# Patient Record
Sex: MALE | Race: BLACK OR AFRICAN AMERICAN | NOT HISPANIC OR LATINO | Employment: FULL TIME | URBAN - METROPOLITAN AREA
[De-identification: names, ages, dates, MRNs, and addresses within clinical notes are randomized per-mention and may not be internally consistent; named-entity substitution may affect disease eponyms.]

---

## 2017-02-27 ENCOUNTER — OFFICE VISIT (OUTPATIENT)
Dept: INTERNAL MEDICINE | Facility: CLINIC | Age: 32
End: 2017-02-27
Payer: COMMERCIAL

## 2017-02-27 VITALS
SYSTOLIC BLOOD PRESSURE: 138 MMHG | HEART RATE: 78 BPM | HEIGHT: 68 IN | BODY MASS INDEX: 37.12 KG/M2 | WEIGHT: 244.94 LBS | DIASTOLIC BLOOD PRESSURE: 62 MMHG

## 2017-02-27 DIAGNOSIS — E11.65 TYPE 2 DIABETES MELLITUS WITH HYPERGLYCEMIA, WITH LONG-TERM CURRENT USE OF INSULIN: ICD-10-CM

## 2017-02-27 DIAGNOSIS — E11.69 ERECTILE DYSFUNCTION ASSOCIATED WITH TYPE 2 DIABETES MELLITUS: ICD-10-CM

## 2017-02-27 DIAGNOSIS — I10 ESSENTIAL HYPERTENSION: ICD-10-CM

## 2017-02-27 DIAGNOSIS — Z79.4 TYPE 2 DIABETES MELLITUS WITH COMPLICATION, WITH LONG-TERM CURRENT USE OF INSULIN: ICD-10-CM

## 2017-02-27 DIAGNOSIS — N52.1 ERECTILE DYSFUNCTION ASSOCIATED WITH TYPE 2 DIABETES MELLITUS: ICD-10-CM

## 2017-02-27 DIAGNOSIS — E78.5 HYPERLIPIDEMIA, UNSPECIFIED HYPERLIPIDEMIA TYPE: ICD-10-CM

## 2017-02-27 DIAGNOSIS — Z79.4 TYPE 2 DIABETES MELLITUS WITH HYPERGLYCEMIA, WITH LONG-TERM CURRENT USE OF INSULIN: ICD-10-CM

## 2017-02-27 DIAGNOSIS — E11.8 TYPE 2 DIABETES MELLITUS WITH COMPLICATION, WITH LONG-TERM CURRENT USE OF INSULIN: ICD-10-CM

## 2017-02-27 PROCEDURE — 3075F SYST BP GE 130 - 139MM HG: CPT | Mod: S$GLB,,, | Performed by: STUDENT IN AN ORGANIZED HEALTH CARE EDUCATION/TRAINING PROGRAM

## 2017-02-27 PROCEDURE — 3045F PR MOST RECENT HEMOGLOBIN A1C LEVEL 7.0-9.0%: CPT | Mod: S$GLB,,, | Performed by: STUDENT IN AN ORGANIZED HEALTH CARE EDUCATION/TRAINING PROGRAM

## 2017-02-27 PROCEDURE — 3078F DIAST BP <80 MM HG: CPT | Mod: S$GLB,,, | Performed by: STUDENT IN AN ORGANIZED HEALTH CARE EDUCATION/TRAINING PROGRAM

## 2017-02-27 PROCEDURE — 99999 PR PBB SHADOW E&M-EST. PATIENT-LVL III: CPT | Mod: PBBFAC,,, | Performed by: STUDENT IN AN ORGANIZED HEALTH CARE EDUCATION/TRAINING PROGRAM

## 2017-02-27 PROCEDURE — 4010F ACE/ARB THERAPY RXD/TAKEN: CPT | Mod: S$GLB,,, | Performed by: STUDENT IN AN ORGANIZED HEALTH CARE EDUCATION/TRAINING PROGRAM

## 2017-02-27 PROCEDURE — 99213 OFFICE O/P EST LOW 20 MIN: CPT | Mod: S$GLB,,, | Performed by: STUDENT IN AN ORGANIZED HEALTH CARE EDUCATION/TRAINING PROGRAM

## 2017-02-27 RX ORDER — METFORMIN HYDROCHLORIDE 1000 MG/1
1000 TABLET ORAL 2 TIMES DAILY WITH MEALS
Qty: 180 TABLET | Refills: 3 | Status: SHIPPED | OUTPATIENT
Start: 2017-02-27 | End: 2017-05-15 | Stop reason: SDUPTHER

## 2017-02-27 RX ORDER — SILDENAFIL 100 MG/1
100 TABLET, FILM COATED ORAL DAILY PRN
Qty: 5 TABLET | Refills: 2 | Status: SHIPPED | OUTPATIENT
Start: 2017-02-27 | End: 2018-04-06 | Stop reason: SDUPTHER

## 2017-02-27 RX ORDER — LANCETS
1 EACH MISCELLANEOUS 4 TIMES DAILY PRN
Qty: 100 EACH | Refills: 5 | Status: SHIPPED | OUTPATIENT
Start: 2017-02-27 | End: 2020-04-28

## 2017-02-27 RX ORDER — LISINOPRIL 2.5 MG/1
2.5 TABLET ORAL DAILY
Qty: 90 TABLET | Refills: 3 | Status: SHIPPED | OUTPATIENT
Start: 2017-02-27 | End: 2018-04-06 | Stop reason: SDUPTHER

## 2017-02-27 RX ORDER — PEN NEEDLE, DIABETIC 30 GX3/16"
NEEDLE, DISPOSABLE MISCELLANEOUS
Qty: 50 EACH | Refills: 6 | Status: SHIPPED | OUTPATIENT
Start: 2017-02-27 | End: 2018-08-16 | Stop reason: SDUPTHER

## 2017-02-27 RX ORDER — ATORVASTATIN CALCIUM 40 MG/1
40 TABLET, FILM COATED ORAL DAILY
Qty: 90 TABLET | Refills: 3 | Status: SHIPPED | OUTPATIENT
Start: 2017-02-27 | End: 2017-05-15 | Stop reason: SDUPTHER

## 2017-02-27 NOTE — PROGRESS NOTES
Subjective:       Patient ID: Fco Zafar III is a 31 y.o. male.    Chief Complaint: Follow-up (essential HTN)    Pt is here for follow up following last appointment 11/2016. His A1c improved from 11.2 (2015) when we initially met, to 9 -> 8.6 (07/09)->9.9 (11/16). Pt downloaded glucose tracking application at prior appointment and charted blood glucose values since the beginning of 2017 have been between 115-167  With a few outlier in the higher 180's. Has been taking 40u qd in the afternoon via pen.  Pt admits he has missed going to the gym for the last mo but was going 2-3x/week before. Pt is apx -4lbs from our last appointment. Counseled on diet and to watch out for sx of hypoglycemia if they should occur. Pt has been taking metformin 1k twice daily as well as insulin 40 once daily usually around 1pm. Pt was started on metformin 04/2016.      -TdAp and pneumonia vaccine 05/2016.   - Eye exam 8/16  - foot exam 11/16    Pt  Has no complaints today. Will measure A1C and give Flu vaccine    Diabetes   Pertinent negatives for hypoglycemia include no confusion, dizziness, headaches, hunger, nervousness/anxiousness, pallor, seizures, sweats or tremors. Pertinent negatives for diabetes include no blurred vision, no chest pain, no fatigue, no polydipsia, no polyphagia, no polyuria, no visual change, no weakness and no weight loss. Pertinent negatives for hypoglycemia complications include no blackouts and no required assistance. Diabetic complications include impotence.   Hyperlipidemia   Exacerbating diseases include diabetes and obesity. Pertinent negatives include no chest pain, focal weakness, leg pain, myalgias or shortness of breath. Current antihyperlipidemic treatment includes diet change and statins. The current treatment provides moderate improvement of lipids.     Review of Systems   Constitutional: Negative for activity change, appetite change, chills, diaphoresis, fatigue, fever, unexpected weight change  and weight loss.   HENT: Negative for congestion, dental problem, ear discharge, ear pain, facial swelling, hearing loss, mouth sores, nosebleeds, postnasal drip, rhinorrhea, sinus pressure, sneezing, sore throat, tinnitus and trouble swallowing.    Eyes: Negative for blurred vision, discharge, redness, itching and visual disturbance.   Respiratory: Negative for cough and shortness of breath.    Cardiovascular: Negative for chest pain and leg swelling.   Gastrointestinal: Negative for abdominal distention, abdominal pain, constipation, diarrhea, nausea, rectal pain and vomiting.   Endocrine: Negative for cold intolerance, heat intolerance, polydipsia, polyphagia and polyuria.   Genitourinary: Positive for impotence.   Musculoskeletal: Negative.  Negative for myalgias.   Skin: Negative.  Negative for pallor.   Allergic/Immunologic: Negative for environmental allergies, food allergies and immunocompromised state.   Neurological: Negative for dizziness, tremors, focal weakness, seizures, weakness and headaches.   Hematological: Negative.    Psychiatric/Behavioral: Negative for confusion. The patient is not nervous/anxious.          Objective:      Physical Exam   Constitutional: He is oriented to person, place, and time. He appears well-developed and well-nourished. No distress.   HENT:   Head: Normocephalic and atraumatic.   Right Ear: External ear normal.   Left Ear: External ear normal.   Eyes: Pupils are equal, round, and reactive to light.   Neck: Normal range of motion. Neck supple. No JVD present. No thyromegaly present.   Cardiovascular: Normal rate, regular rhythm, normal heart sounds and intact distal pulses.  Exam reveals no gallop and no friction rub.    No murmur heard.  Pulmonary/Chest: Effort normal and breath sounds normal. No respiratory distress. He has no wheezes. He has no rales. He exhibits no tenderness.   Abdominal: Soft. Bowel sounds are normal. He exhibits no distension and no mass. There is no  tenderness. There is no rebound and no guarding.   Musculoskeletal: Normal range of motion. He exhibits no edema or deformity.   Lymphadenopathy:     He has no cervical adenopathy.   Neurological: He is alert and oriented to person, place, and time. He has normal reflexes. He displays normal reflexes.   Skin: He is not diaphoretic. No erythema. No pallor.   Psychiatric: He has a normal mood and affect. His behavior is normal. Judgment and thought content normal.         Labs:     7/9/2016 13:46   Vitamin B-12 606   Sodium 140   Potassium 4.1   Chloride 104   CO2 27   Anion Gap 9   BUN, Bld 8   Creatinine 0.9   eGFR if non African American >60.0   eGFR if African American >60.0   Glucose 147 (H)   Calcium 9.4   Alkaline Phosphatase 100   Total Protein 7.3   Albumin 4.1   Total Bilirubin 0.7   AST 26   ALT 33   Hemoglobin A1C 8.6 (H)   Estimated Avg Glucose 200 (H)         Assessment:       1. Type 2 diabetes mellitus with hyperglycemia    2. Hyperlipemia    3. Health care maintenance        Plan:         1. Type 2 diabetes mellitus with hyperglycemia  - No hypoglycemic sx or episodes ever per pt  - continue lantus @ 40 U for now  - continue metformin 1k BID  - recheck A1C today    2. Hyperlipemia  - atorvastatin increased to mod dose of 40 MG tablet, @ 05/2016, pt tolerating W/O SE  - continue same dose    3. HTN  - will refill lisinopril today       RTC in 3mo at [facility] or [facility]    This patient was seen and plan reviewed with Dr. Wilson    It was a pleasure caring for this patient.    Ry Lindquist M.D.  IM PGY2

## 2017-02-27 NOTE — MR AVS SNAPSHOT
[facility]  [address]  [facility] [address]  Phone: [phone]  Fax: [phone]                  Fco Zafar III   2017 1:00 PM   Office Visit    Description:  Male : 1985   Provider:  Ry Lindquist MD   Department:  [facility]           Reason for Visit     Follow-up           Diagnoses this Visit        Comments    Essential hypertension         Type 2 diabetes mellitus with complication, with long-term current use of insulin         Hyperlipidemia, unspecified hyperlipidemia type         Erectile dysfunction associated with type 2 diabetes mellitus         Type 2 diabetes mellitus with hyperglycemia, with long-term current use of insulin                To Do List           Goals (5 Years of Data)     None       These Medications        Disp Refills Start End    lisinopril (PRINIVIL,ZESTRIL) 2.5 MG tablet 90 tablet 3 2017     Take 1 tablet (2.5 mg total) by mouth once daily. - Oral    Pharmacy: [facility] [place] [address] AT [facility] Ph #: [phone]       atorvastatin (LIPITOR) 40 MG tablet 90 tablet 3 2017     Take 1 tablet (40 mg total) by mouth once daily. - Oral    Pharmacy: [facility] [address] [place] [address] [address] AT [facility] Ph #: [phone]       sildenafil (VIAGRA) 100 MG tablet 5 tablet 2 2017    Take 1 tablet (100 mg total) by mouth daily as needed for Erectile Dysfunction. - Oral    Pharmacy: [facility] [address] [place] [address] [address] AT [facility] Ph #: [phone]       metformin (GLUCOPHAGE) 1000 MG tablet 180 tablet 3 2017    Take 1 tablet (1,000 mg total) by mouth 2 (two) times daily with meals. - Oral    Pharmacy: [facility] [address] [place] [address] [address] AT [facility] Ph #: [phone]  "      lancets Misc 100 each 5 2/27/2017     1 each by Misc.(Non-Drug; Combo Route) route 4 (four) times daily as needed. - Misc.(Non-Drug; Combo Route)    Pharmacy: [facility] [employer] [address] [place] [address] [place] [address] AT [facility] Ph #: [phone]       pen needle, diabetic 32 gauge x 5/32" Ndle 50 each 6 2/27/2017     Take as prescribed on insulin medication    Pharmacy: [facility] [employer] [address] [place] [address] [place] [address] AT [facility] Ph #: [phone]       blood sugar diagnostic Strp 100 strip 5 2/27/2017     1 strip by Misc.(Non-Drug; Combo Route) route 4 (four) times daily as needed. - Misc.(Non-Drug; Combo Route)    Pharmacy: [facility] [employer] [address] [place] [address] [place] [address] AT [facility] Ph #: [phone]         [facility]     [facility] Care [facility] - 24/7 Assistance  Registered nurses in the [facility] provide clinical advisement, health education, appointment booking, and other advisory services.  Call for this free service at [phone].             Medications           Message regarding Medications     Verify the changes and/or additions to your medication regime listed below are the same as discussed with your clinician today.  If any of these changes or additions are incorrect, please notify your healthcare provider.             Verify that the below list of medications is an accurate representation of the medications you are currently taking.  If none reported, the list may be blank. If incorrect, please contact your healthcare provider. Carry this list with you in case of emergency.           Current Medications     atorvastatin (LIPITOR) 40 MG tablet Take 1 tablet (40 mg total) by mouth once daily.    blood sugar diagnostic Strp 1 strip by Misc.(Non-Drug; Combo Route) route 4 (four) times daily as needed.    blood-glucose meter Misc 1 each by Misc.(Non-Drug; Combo Route) " "route 4 (four) times daily as needed.    econazole nitrate 1 % cream Apply topically 2 (two) times daily.    hydrOXYzine pamoate (VISTARIL) 25 MG Cap Take 1 capsule (25 mg total) by mouth every 8 (eight) hours as needed (anxiety).    insulin glargine (LANTUS SOLOSTAR) 100 unit/mL (3 mL) InPn pen Inject 40 Units into the skin once daily.    lancets Misc 1 each by Misc.(Non-Drug; Combo Route) route 4 (four) times daily as needed.    lisinopril (PRINIVIL,ZESTRIL) 2.5 MG tablet Take 1 tablet (2.5 mg total) by mouth once daily.    metformin (GLUCOPHAGE) 1000 MG tablet Take 1 tablet (1,000 mg total) by mouth 2 (two) times daily with meals.    pen needle, diabetic 32 gauge x 5/32" Ndle Take as prescribed on insulin medication    sildenafil (VIAGRA) 100 MG tablet Take 1 tablet (100 mg total) by mouth daily as needed for Erectile Dysfunction.           Clinical Reference Information           Your Vitals Were     BP                   138/62           Blood Pressure          Most Recent Value    BP  138/62      Allergies as of 2/27/2017     No Known Allergies      Immunizations Administered on Date of Encounter - 2/27/2017     None      Orders Placed During Today's Visit     Future Labs/Procedures Expected by Expires    Hemoglobin A1c  2/27/2017 4/28/2018      Language Assistance Services     ATTENTION: Language assistance services are available, free of charge. Please call [phone].      ATENCIÓN: Si habla gracyañol, tiene a parr disposición servicios gratuitos de asistencia lingüística. Llame al [phone].     [facility] Ý: N?u b?n nói Ti?ng Vi?t, có các d?ch v? h? tr? ngôn ng? mi?n phí dành cho b?n. G?i s? [phone].         [facility] complies with applicable Federal civil rights laws and does not discriminate on the basis of race, color, national origin, age, disability, or sex.        "

## 2017-03-01 RX ORDER — GLIPIZIDE 5 MG/1
5 TABLET, FILM COATED, EXTENDED RELEASE ORAL
Qty: 90 TABLET | Refills: 0 | Status: SHIPPED | OUTPATIENT
Start: 2017-03-01 | End: 2017-05-15 | Stop reason: SDUPTHER

## 2017-03-01 NOTE — PROGRESS NOTES
Contacted patient regarding last A1C of 8.9 on 2/27/17 improved from previous 9.9. Based on his BG ranges, prandial spikes, and previous hx of difficulty w/ scheduled insulin dosage adherence will start on XR glipizide 5mg today to be added to current regimen of 40U insulin qd + metformin 1k BID. Contacted pt who expressed understanding and appreciation. Counseled on monitoring for sx of hypoglycemia. Will f/u with me at [facility] clinic May 15 at 3:30PM for repeat a1c.    It was a pleasure caring for this patient,    Ry Lindquist MD  Internal Medicine PGY2

## 2017-03-07 NOTE — PROGRESS NOTES
I have personally taken the history and examined this patient and agree with the resident's note as stated above with the following thoughts:    Hgb A1c 8.9 but home glucoses sound better- Continue current meds and follow up in 3 months-- diet and exercise discussed today

## 2017-05-15 ENCOUNTER — OFFICE VISIT (OUTPATIENT)
Dept: INTERNAL MEDICINE | Facility: CLINIC | Age: 32
End: 2017-05-15
Attending: INTERNAL MEDICINE
Payer: COMMERCIAL

## 2017-05-15 VITALS
SYSTOLIC BLOOD PRESSURE: 138 MMHG | DIASTOLIC BLOOD PRESSURE: 78 MMHG | HEIGHT: 67 IN | TEMPERATURE: 98 F | BODY MASS INDEX: 38.72 KG/M2 | WEIGHT: 246.69 LBS | OXYGEN SATURATION: 97 % | HEART RATE: 96 BPM

## 2017-05-15 DIAGNOSIS — Z79.4 TYPE 2 DIABETES MELLITUS WITH COMPLICATION, WITH LONG-TERM CURRENT USE OF INSULIN: ICD-10-CM

## 2017-05-15 DIAGNOSIS — B35.3 TINEA PEDIS OF BOTH FEET: ICD-10-CM

## 2017-05-15 DIAGNOSIS — Z79.4 TYPE 2 DIABETES MELLITUS WITH HYPERGLYCEMIA, WITH LONG-TERM CURRENT USE OF INSULIN: Primary | ICD-10-CM

## 2017-05-15 DIAGNOSIS — E11.8 TYPE 2 DIABETES MELLITUS WITH COMPLICATION, WITH LONG-TERM CURRENT USE OF INSULIN: ICD-10-CM

## 2017-05-15 DIAGNOSIS — E11.65 TYPE 2 DIABETES MELLITUS WITH HYPERGLYCEMIA, WITH LONG-TERM CURRENT USE OF INSULIN: Primary | ICD-10-CM

## 2017-05-15 DIAGNOSIS — E78.5 HYPERLIPIDEMIA, UNSPECIFIED HYPERLIPIDEMIA TYPE: ICD-10-CM

## 2017-05-15 PROCEDURE — 99214 OFFICE O/P EST MOD 30 MIN: CPT | Mod: S$GLB,,, | Performed by: STUDENT IN AN ORGANIZED HEALTH CARE EDUCATION/TRAINING PROGRAM

## 2017-05-15 PROCEDURE — 3075F SYST BP GE 130 - 139MM HG: CPT | Mod: S$GLB,,, | Performed by: STUDENT IN AN ORGANIZED HEALTH CARE EDUCATION/TRAINING PROGRAM

## 2017-05-15 PROCEDURE — 3078F DIAST BP <80 MM HG: CPT | Mod: S$GLB,,, | Performed by: STUDENT IN AN ORGANIZED HEALTH CARE EDUCATION/TRAINING PROGRAM

## 2017-05-15 PROCEDURE — 4010F ACE/ARB THERAPY RXD/TAKEN: CPT | Mod: S$GLB,,, | Performed by: STUDENT IN AN ORGANIZED HEALTH CARE EDUCATION/TRAINING PROGRAM

## 2017-05-15 PROCEDURE — 3045F PR MOST RECENT HEMOGLOBIN A1C LEVEL 7.0-9.0%: CPT | Mod: S$GLB,,, | Performed by: STUDENT IN AN ORGANIZED HEALTH CARE EDUCATION/TRAINING PROGRAM

## 2017-05-15 PROCEDURE — 99999 PR PBB SHADOW E&M-EST. PATIENT-LVL III: CPT | Mod: PBBFAC,,, | Performed by: STUDENT IN AN ORGANIZED HEALTH CARE EDUCATION/TRAINING PROGRAM

## 2017-05-15 RX ORDER — INSULIN GLARGINE 100 [IU]/ML
40 INJECTION, SOLUTION SUBCUTANEOUS DAILY
Qty: 15 ML | Refills: 7 | Status: SHIPPED | OUTPATIENT
Start: 2017-05-15 | End: 2017-05-23 | Stop reason: SDUPTHER

## 2017-05-15 RX ORDER — GLIPIZIDE 5 MG/1
5 TABLET, FILM COATED, EXTENDED RELEASE ORAL
Qty: 90 TABLET | Refills: 0 | Status: SHIPPED | OUTPATIENT
Start: 2017-05-15 | End: 2017-07-05 | Stop reason: ALTCHOICE

## 2017-05-15 RX ORDER — METFORMIN HYDROCHLORIDE 1000 MG/1
1000 TABLET ORAL 2 TIMES DAILY WITH MEALS
Qty: 180 TABLET | Refills: 3 | Status: SHIPPED | OUTPATIENT
Start: 2017-05-15 | End: 2018-07-23 | Stop reason: SDUPTHER

## 2017-05-15 RX ORDER — ATORVASTATIN CALCIUM 40 MG/1
40 TABLET, FILM COATED ORAL DAILY
Qty: 90 TABLET | Refills: 3 | Status: SHIPPED | OUTPATIENT
Start: 2017-05-15 | End: 2018-04-06 | Stop reason: SDUPTHER

## 2017-05-15 NOTE — PROGRESS NOTES
Subjective:       Patient ID: Fco Zafar III is a 31 y.o. male.    Chief Complaint: Annual Exam    Pt is here for follow up following last appointment 11/2016. His A1c improved from 11.2 (2015) when we initially met, to 9 -> 8.6 (07/09)->9.9 (11/16). Pt downloaded glucose tracking application at prior appointment and charted blood glucose values since the beginning of 2017 have been between 115-167  With a few outlier in the higher 180's. Has been taking 40u qd in the afternoon via pen.  Pt admits he has missed going to the gym for the last mo but was going 2-3x/week before. Pt is apx -4lbs from our last appointment. Counseled on diet and to watch out for sx of hypoglycemia if they should occur. Pt has been taking metformin 1k twice daily as well as insulin 40 once daily usually around 1pm. Pt was started on metformin 04/2016.      -TdAp and pneumonia vaccine 05/2016.   - Eye exam 8/16  - foot exam 11/16    Pt  Has no complaints today. Will measure A1C and give Flu vaccine    Diabetes   Pertinent negatives for hypoglycemia include no confusion, dizziness, headaches, hunger, nervousness/anxiousness, pallor, seizures, sweats or tremors. Pertinent negatives for diabetes include no blurred vision, no chest pain, no fatigue, no polydipsia, no polyphagia, no polyuria, no visual change, no weakness and no weight loss. Pertinent negatives for hypoglycemia complications include no blackouts and no required assistance. Diabetic complications include impotence.   Hyperlipidemia   Exacerbating diseases include diabetes and obesity. Pertinent negatives include no chest pain, focal weakness, leg pain, myalgias or shortness of breath. Current antihyperlipidemic treatment includes diet change and statins. The current treatment provides moderate improvement of lipids.     Review of Systems   Constitutional: Negative for activity change, appetite change, chills, diaphoresis, fatigue, fever, unexpected weight change and weight  loss.   HENT: Negative for congestion, dental problem, ear discharge, ear pain, facial swelling, hearing loss, mouth sores, nosebleeds, postnasal drip, rhinorrhea, sinus pressure, sneezing, sore throat, tinnitus and trouble swallowing.    Eyes: Negative for blurred vision, discharge, redness, itching and visual disturbance.   Respiratory: Negative for cough and shortness of breath.    Cardiovascular: Negative for chest pain and leg swelling.   Gastrointestinal: Negative for abdominal distention, abdominal pain, constipation, diarrhea, nausea, rectal pain and vomiting.   Endocrine: Negative for cold intolerance, heat intolerance, polydipsia, polyphagia and polyuria.   Genitourinary: Positive for impotence.   Musculoskeletal: Negative.  Negative for myalgias.   Skin: Negative.  Negative for pallor.   Allergic/Immunologic: Negative for environmental allergies, food allergies and immunocompromised state.   Neurological: Negative for dizziness, tremors, focal weakness, seizures, weakness and headaches.   Hematological: Negative.    Psychiatric/Behavioral: Negative for confusion. The patient is not nervous/anxious.          Objective:      Physical Exam   Constitutional: He is oriented to person, place, and time. He appears well-developed and well-nourished. No distress.   HENT:   Head: Normocephalic and atraumatic.   Right Ear: External ear normal.   Left Ear: External ear normal.   Eyes: Pupils are equal, round, and reactive to light.   Neck: Normal range of motion. Neck supple. No JVD present. No thyromegaly present.   Cardiovascular: Normal rate, regular rhythm, normal heart sounds and intact distal pulses.  Exam reveals no gallop and no friction rub.    No murmur heard.  Pulmonary/Chest: Effort normal and breath sounds normal. No respiratory distress. He has no wheezes. He has no rales. He exhibits no tenderness.   Abdominal: Soft. Bowel sounds are normal. He exhibits no distension and no mass. There is no  tenderness. There is no rebound and no guarding.   Musculoskeletal: Normal range of motion. He exhibits no edema or deformity.   Lymphadenopathy:     He has no cervical adenopathy.   Neurological: He is alert and oriented to person, place, and time. He has normal reflexes. He displays normal reflexes.   Skin: He is not diaphoretic. No erythema. No pallor.   Psychiatric: He has a normal mood and affect. His behavior is normal. Judgment and thought content normal.         Labs:       7/9/2016 13:46 7/11/2016 14:34 11/12/2016 09:09 2/27/2017 13:53   Hemoglobin A1C 8.6 (H)  9.9 (H) 8.9 (H)   Estimated Avg Glucose 200 (H)  237 (H) 209 (H)     In Clinic Fingerstick Glucose 5/15/17:   210    Assessment:     32yo M w/ DM2 presents for 3mo f/u. Was 12.9 upon establishing care with me in 8/15, now 210 in house last A1c 8.9, [place] [facility] [place]. Will return for F/U and repeat yearly labs in 3mo.     Plan:       1. Type 2 diabetes mellitus with hyperglycemia, with long-term current use of insulin  - POCT GLUCOSE  - Hemoglobin A1c; Future  - insulin glargine (LANTUS SOLOSTAR) 100 unit/mL (3 mL) InPn pen; Inject 40 Units into the skin once daily.  Dispense: 15 mL; Refill: 7    2. Tinea pedis of both feet  Foot exam completed today 5/15/17 WNL     3. Type 2 diabetes mellitus with complication, with long-term current use of insulin  - metformin (GLUCOPHAGE) 1000 MG tablet; Take 1 tablet (1,000 mg total) by mouth 2 (two) times daily with meals.  Dispense: 180 tablet; Refill: 3  - insulin glargine (LANTUS SOLOSTAR) 100 unit/mL (3 mL) InPn pen; Inject 40 Units into the skin once daily.  Dispense: 15 mL; Refill: 7    4. Hyperlipidemia, unspecified hyperlipidemia type  - atorvastatin (LIPITOR) 40 MG tablet; Take 1 tablet (40 mg total) by mouth once daily.  Dispense: 90 tablet; Refill: 3    RTC in 3mo at [facility] or [facility]    This patient was seen and plan reviewed with Dr. Dvorin    It was a pleasure caring  for this patient.    Ry Lindquist M.D.  IM PGY2

## 2017-05-15 NOTE — MR AVS SNAPSHOT
[facility]  [address] [place] [address]  Phone: [phone]  Fax: [phone]                  Fco Zafar III   5/15/2017 3:30 PM   Office Visit    Description:  Male : 1985   Provider:  Ry Lindquist MD   Department:  [facility]           Reason for Visit     Annual Exam           Diagnoses this Visit        Comments    Type 2 diabetes mellitus with hyperglycemia, with long-term current use of insulin    -  Primary            To Do List           Goals (5 Years of Data)     None      [facility] On [facility]     [facility] Care [facility] -  Assistance  Unless otherwise directed by your provider, please contact [facility], our nurse care line that is available for  assistance.     Registered nurses in the [facility] provide: appointment scheduling, clinical advisement, health education, and other advisory services.  Call: [phone] (toll free)               Medications           Message regarding Medications     Verify the changes and/or additions to your medication regime listed below are the same as discussed with your clinician today.  If any of these changes or additions are incorrect, please notify your healthcare provider.             Verify that the below list of medications is an accurate representation of the medications you are currently taking.  If none reported, the list may be blank. If incorrect, please contact your healthcare provider. Carry this list with you in case of emergency.           Current Medications     atorvastatin (LIPITOR) 40 MG tablet Take 1 tablet (40 mg total) by mouth once daily.    blood sugar diagnostic Strp 1 strip by Misc.(Non-Drug; Combo Route) route 4 (four) times daily as needed.    blood-glucose meter Misc 1 each by Misc.(Non-Drug; Combo Route) route 4 (four) times daily as needed.    econazole nitrate 1 % cream Apply topically 2 (two) times daily.    glipiZIDE (GLUCOTROL)  "5 MG TR24 Take 1 tablet (5 mg total) by mouth daily with breakfast.    hydrOXYzine pamoate (VISTARIL) 25 MG Cap Take 1 capsule (25 mg total) by mouth every 8 (eight) hours as needed (anxiety).    insulin glargine (LANTUS SOLOSTAR) 100 unit/mL (3 mL) InPn pen Inject 40 Units into the skin once daily.    lancets Misc 1 each by Misc.(Non-Drug; Combo Route) route 4 (four) times daily as needed.    lisinopril (PRINIVIL,ZESTRIL) 2.5 MG tablet Take 1 tablet (2.5 mg total) by mouth once daily.    metformin (GLUCOPHAGE) 1000 MG tablet Take 1 tablet (1,000 mg total) by mouth 2 (two) times daily with meals.    pen needle, diabetic 32 gauge x 5/32" Ndle Take as prescribed on insulin medication    sildenafil (VIAGRA) 100 MG tablet Take 1 tablet (100 mg total) by mouth daily as needed for Erectile Dysfunction.           Clinical Reference Information           Your Vitals Were     BP Pulse Temp Height Weight SpO2    138/78 (BP Location: Right arm, Patient Position: Sitting, BP Method: Manual) 96 97.7 °F (36.5 °C) 5' 7" (1.702 m) 111.9 kg (246 lb 11.1 oz) 97%    BMI                38.64 kg/m2          Blood Pressure          Most Recent Value    BP  138/78      Allergies as of 5/15/2017     No Known Allergies      Immunizations Administered on Date of Encounter - 5/15/2017     None      Orders Placed During Today's Visit      Normal Orders This Visit    POCT GLUCOSE       Language Assistance Services     ATTENTION: Language assistance services are available, free of charge. Please call [phone].      ATENCIÓN: Si habla edna, tiene a parr disposición servicios gratuitos de asistencia lingüística. Llame al [phone].     ANGEL Ý: N?u b?n nói Ti?ng Vi?t, có các d?ch v? h? tr? ngôn ng? mi?n phí dành cho b?n. G?i s? [phone].         [facility] complies with applicable Federal civil rights laws and does not discriminate on the basis of race, color, national origin, age, disability, or sex.        "

## 2017-05-20 ENCOUNTER — LAB VISIT (OUTPATIENT)
Dept: LAB | Facility: HOSPITAL | Age: 32
End: 2017-05-20
Payer: COMMERCIAL

## 2017-05-20 DIAGNOSIS — E11.65 TYPE 2 DIABETES MELLITUS WITH HYPERGLYCEMIA, WITH LONG-TERM CURRENT USE OF INSULIN: ICD-10-CM

## 2017-05-20 DIAGNOSIS — Z79.4 TYPE 2 DIABETES MELLITUS WITH HYPERGLYCEMIA, WITH LONG-TERM CURRENT USE OF INSULIN: ICD-10-CM

## 2017-05-20 PROCEDURE — 36415 COLL VENOUS BLD VENIPUNCTURE: CPT

## 2017-05-20 PROCEDURE — 83036 HEMOGLOBIN GLYCOSYLATED A1C: CPT

## 2017-05-22 LAB
ESTIMATED AVG GLUCOSE: 217 MG/DL
HBA1C MFR BLD HPLC: 9.2 %

## 2017-05-23 DIAGNOSIS — Z79.4 TYPE 2 DIABETES MELLITUS WITH HYPERGLYCEMIA, WITH LONG-TERM CURRENT USE OF INSULIN: ICD-10-CM

## 2017-05-23 DIAGNOSIS — Z79.4 TYPE 2 DIABETES MELLITUS WITH COMPLICATION, WITH LONG-TERM CURRENT USE OF INSULIN: ICD-10-CM

## 2017-05-23 DIAGNOSIS — E11.65 TYPE 2 DIABETES MELLITUS WITH HYPERGLYCEMIA, WITH LONG-TERM CURRENT USE OF INSULIN: ICD-10-CM

## 2017-05-23 DIAGNOSIS — E11.8 TYPE 2 DIABETES MELLITUS WITH COMPLICATION, WITH LONG-TERM CURRENT USE OF INSULIN: ICD-10-CM

## 2017-05-23 RX ORDER — INSULIN GLARGINE 100 [IU]/ML
25 INJECTION, SOLUTION SUBCUTANEOUS 2 TIMES DAILY
Qty: 15 ML | Refills: 5 | Status: SHIPPED | OUTPATIENT
Start: 2017-05-23 | End: 2018-06-18 | Stop reason: SDUPTHER

## 2017-05-23 NOTE — PROGRESS NOTES
Contacted the pt regarding his A1c which has increased from 8.9 to 9.2. He has been checking his morning sugars occasionally since our last apt avg has been 170-220 correlating with elevated A1c. Lantus dosage adjusted from 40u qd to 25u BID for better glucose control. Pt instructed on signs of hypoglycemia and will continue to check his sugars daily. Will f/u in clinic.    It was a pleasure caring for this patient,    Ry Lindquist MD  IM PGY2

## 2017-07-05 RX ORDER — GLIPIZIDE 5 MG/1
TABLET, FILM COATED, EXTENDED RELEASE ORAL
Qty: 90 TABLET | Refills: 0 | Status: SHIPPED | OUTPATIENT
Start: 2017-07-05 | End: 2018-04-25

## 2018-03-27 ENCOUNTER — TELEPHONE (OUTPATIENT)
Dept: INTERNAL MEDICINE | Facility: CLINIC | Age: 33
End: 2018-03-27

## 2018-03-27 NOTE — TELEPHONE ENCOUNTER
----- Message from Chika Marin sent at 3/16/2018 10:40 AM CDT -----  Contact: Pt request from [facility]  Appointment Request From: Fco Zafar III    With Provider: Ry Lindquist MD [-Primary Care Physician-]    Would Accept With:Only the person I've selected    Preferred Date Range: Any date 3/19/2018 or later    Preferred Times: Any    Reason for visit: Request an Appt    Comments:  Check up with blood work.

## 2018-04-06 DIAGNOSIS — N52.1 ERECTILE DYSFUNCTION ASSOCIATED WITH TYPE 2 DIABETES MELLITUS: ICD-10-CM

## 2018-04-06 DIAGNOSIS — E11.8 TYPE 2 DIABETES MELLITUS WITH COMPLICATION, WITH LONG-TERM CURRENT USE OF INSULIN: ICD-10-CM

## 2018-04-06 DIAGNOSIS — E78.5 HYPERLIPIDEMIA, UNSPECIFIED HYPERLIPIDEMIA TYPE: ICD-10-CM

## 2018-04-06 DIAGNOSIS — Z79.4 TYPE 2 DIABETES MELLITUS WITH COMPLICATION, WITH LONG-TERM CURRENT USE OF INSULIN: ICD-10-CM

## 2018-04-06 DIAGNOSIS — I10 ESSENTIAL HYPERTENSION: ICD-10-CM

## 2018-04-06 DIAGNOSIS — E11.69 ERECTILE DYSFUNCTION ASSOCIATED WITH TYPE 2 DIABETES MELLITUS: ICD-10-CM

## 2018-04-06 RX ORDER — SILDENAFIL 100 MG/1
100 TABLET, FILM COATED ORAL DAILY PRN
Qty: 5 TABLET | Refills: 2 | Status: SHIPPED | OUTPATIENT
Start: 2018-04-06 | End: 2019-05-07 | Stop reason: SDUPTHER

## 2018-04-06 RX ORDER — LISINOPRIL 2.5 MG/1
2.5 TABLET ORAL DAILY
Qty: 90 TABLET | Refills: 3 | Status: SHIPPED | OUTPATIENT
Start: 2018-04-06 | End: 2019-05-13 | Stop reason: SDUPTHER

## 2018-04-06 RX ORDER — ATORVASTATIN CALCIUM 40 MG/1
40 TABLET, FILM COATED ORAL DAILY
Qty: 90 TABLET | Refills: 3 | Status: SHIPPED | OUTPATIENT
Start: 2018-04-06 | End: 2019-05-13 | Stop reason: SDUPTHER

## 2018-04-25 ENCOUNTER — CLINICAL SUPPORT (OUTPATIENT)
Dept: DIABETES | Facility: CLINIC | Age: 33
End: 2018-04-25
Payer: COMMERCIAL

## 2018-04-25 ENCOUNTER — OFFICE VISIT (OUTPATIENT)
Dept: INTERNAL MEDICINE | Facility: CLINIC | Age: 33
End: 2018-04-25
Payer: COMMERCIAL

## 2018-04-25 VITALS
HEIGHT: 67 IN | OXYGEN SATURATION: 99 % | SYSTOLIC BLOOD PRESSURE: 115 MMHG | BODY MASS INDEX: 36.99 KG/M2 | WEIGHT: 235.69 LBS | DIASTOLIC BLOOD PRESSURE: 75 MMHG | HEART RATE: 71 BPM

## 2018-04-25 DIAGNOSIS — Z79.4 TYPE 2 DIABETES MELLITUS WITH HYPERGLYCEMIA, WITH LONG-TERM CURRENT USE OF INSULIN: Primary | ICD-10-CM

## 2018-04-25 DIAGNOSIS — R73.09 ELEVATED HEMOGLOBIN A1C: Primary | ICD-10-CM

## 2018-04-25 DIAGNOSIS — E78.5 HYPERLIPIDEMIA, UNSPECIFIED HYPERLIPIDEMIA TYPE: ICD-10-CM

## 2018-04-25 DIAGNOSIS — Z79.4 TYPE 2 DIABETES MELLITUS WITH HYPERGLYCEMIA, WITH LONG-TERM CURRENT USE OF INSULIN: ICD-10-CM

## 2018-04-25 DIAGNOSIS — E11.65 TYPE 2 DIABETES MELLITUS WITH HYPERGLYCEMIA, WITH LONG-TERM CURRENT USE OF INSULIN: ICD-10-CM

## 2018-04-25 DIAGNOSIS — E11.65 TYPE 2 DIABETES MELLITUS WITH HYPERGLYCEMIA, WITH LONG-TERM CURRENT USE OF INSULIN: Primary | ICD-10-CM

## 2018-04-25 LAB
BACTERIA #/AREA URNS AUTO: NORMAL /HPF
BILIRUB UR QL STRIP: NEGATIVE
CLARITY UR REFRACT.AUTO: CLEAR
COLOR UR AUTO: YELLOW
CREAT UR-MCNC: 112 MG/DL
CREAT UR-MCNC: 112 MG/DL
GLUCOSE UR QL STRIP: ABNORMAL
HGB UR QL STRIP: NEGATIVE
KETONES UR QL STRIP: NEGATIVE
LEUKOCYTE ESTERASE UR QL STRIP: NEGATIVE
MICROALBUMIN UR DL<=1MG/L-MCNC: 58 UG/ML
MICROALBUMIN/CREATININE RATIO: 51.8 UG/MG
MICROSCOPIC COMMENT: NORMAL
NITRITE UR QL STRIP: NEGATIVE
PH UR STRIP: 5 [PH] (ref 5–8)
PROT UR QL STRIP: NEGATIVE
RBC #/AREA URNS AUTO: 1 /HPF (ref 0–4)
SP GR UR STRIP: 1.01 (ref 1–1.03)
SQUAMOUS #/AREA URNS AUTO: 0 /HPF
URN SPEC COLLECT METH UR: ABNORMAL
UROBILINOGEN UR STRIP-ACNC: NEGATIVE EU/DL
WBC #/AREA URNS AUTO: 1 /HPF (ref 0–5)
YEAST UR QL AUTO: NORMAL

## 2018-04-25 PROCEDURE — 81001 URINALYSIS AUTO W/SCOPE: CPT

## 2018-04-25 PROCEDURE — 99999 PR PBB SHADOW E&M-EST. PATIENT-LVL V: CPT | Mod: PBBFAC,,, | Performed by: STUDENT IN AN ORGANIZED HEALTH CARE EDUCATION/TRAINING PROGRAM

## 2018-04-25 PROCEDURE — 82043 UR ALBUMIN QUANTITATIVE: CPT

## 2018-04-25 PROCEDURE — 3078F DIAST BP <80 MM HG: CPT | Mod: CPTII,S$GLB,, | Performed by: STUDENT IN AN ORGANIZED HEALTH CARE EDUCATION/TRAINING PROGRAM

## 2018-04-25 PROCEDURE — 99214 OFFICE O/P EST MOD 30 MIN: CPT | Mod: S$GLB,,, | Performed by: STUDENT IN AN ORGANIZED HEALTH CARE EDUCATION/TRAINING PROGRAM

## 2018-04-25 PROCEDURE — 3074F SYST BP LT 130 MM HG: CPT | Mod: CPTII,S$GLB,, | Performed by: STUDENT IN AN ORGANIZED HEALTH CARE EDUCATION/TRAINING PROGRAM

## 2018-04-25 PROCEDURE — 3008F BODY MASS INDEX DOCD: CPT | Mod: CPTII,S$GLB,, | Performed by: STUDENT IN AN ORGANIZED HEALTH CARE EDUCATION/TRAINING PROGRAM

## 2018-04-25 PROCEDURE — G0108 DIAB MANAGE TRN  PER INDIV: HCPCS | Mod: S$GLB,,, | Performed by: DIETITIAN, REGISTERED

## 2018-04-25 PROCEDURE — 3046F HEMOGLOBIN A1C LEVEL >9.0%: CPT | Mod: CPTII,S$GLB,, | Performed by: STUDENT IN AN ORGANIZED HEALTH CARE EDUCATION/TRAINING PROGRAM

## 2018-04-25 NOTE — PROGRESS NOTES
Subjective:       Patient ID: Fco Zafar III is a 32 y.o. male.    Chief Complaint: Follow-up    Pt is here for follow up, last seen by me in 05/2017. He has no complaints today and is feeling well. Feels like he has his sugars and weight under better control. His A1c was initially 11.2 (2015) when we initially met, improved to 9 -> 8.6 (07/09)->9.9 (11/16). Mr. Zafar was prescribed 30u Lantus BID at our last appointment but for the last few months has been taking 60u qAM instead to avoid 2 sticks a day. He has not been checking his BG levels regularly but did check a fasting glucose yesterday of 127. And a meal time glucose of 147. Pt is apx -11lbs from our last appointment weighing in at 235lbs today. Counseled on diet and to watch out for sx of hypoglycemia if they should occur. Pt has been taking metformin 1k twice daily since 4/2016. He is also taking lisinopril 2.5mg qd and daily statin therapy.      -TdAp and pneumonia vaccine 05/2016.   - Eye exam 8/16 -> referral placed today   - foot exam 11/16-> performed in clinic today  - UA, metabolic panel, lipids, and A1C ordered today       Diabetes   Pertinent negatives for hypoglycemia include no confusion, dizziness, headaches, hunger, nervousness/anxiousness, pallor, seizures, sweats or tremors. Pertinent negatives for diabetes include no blurred vision, no chest pain, no fatigue, no polydipsia, no polyphagia, no polyuria, no visual change, no weakness and no weight loss. Pertinent negatives for hypoglycemia complications include no blackouts and no required assistance. Diabetic complications include impotence.   Hyperlipidemia   Exacerbating diseases include diabetes and obesity. Pertinent negatives include no chest pain, focal weakness, leg pain, myalgias or shortness of breath. Current antihyperlipidemic treatment includes diet change and statins. The current treatment provides moderate improvement of lipids.     Review of Systems   Constitutional:  Negative for activity change, appetite change, chills, diaphoresis, fatigue, fever, unexpected weight change and weight loss.   HENT: Negative for congestion, dental problem, ear discharge, ear pain, facial swelling, hearing loss, mouth sores, nosebleeds, postnasal drip, rhinorrhea, sinus pressure, sneezing, sore throat, tinnitus and trouble swallowing.    Eyes: Negative for blurred vision, discharge, redness, itching and visual disturbance.   Respiratory: Negative for cough and shortness of breath.    Cardiovascular: Negative for chest pain and leg swelling.   Gastrointestinal: Negative for abdominal distention, abdominal pain, constipation, diarrhea, nausea, rectal pain and vomiting.   Endocrine: Negative for cold intolerance, heat intolerance, polydipsia, polyphagia and polyuria.   Genitourinary: Positive for impotence.   Musculoskeletal: Negative.  Negative for myalgias.   Skin: Negative.  Negative for pallor.   Allergic/Immunologic: Negative for environmental allergies, food allergies and immunocompromised state.   Neurological: Negative for dizziness, tremors, focal weakness, seizures, weakness and headaches.   Hematological: Negative.    Psychiatric/Behavioral: Negative for confusion. The patient is not nervous/anxious.          Objective:      Physical Exam   Constitutional: He is oriented to person, place, and time. He appears well-developed and well-nourished. No distress.   HENT:   Head: Normocephalic and atraumatic.   Right Ear: External ear normal.   Left Ear: External ear normal.   Eyes: Pupils are equal, round, and reactive to light.   Neck: Normal range of motion. Neck supple. No JVD present. No thyromegaly present.   Cardiovascular: Normal rate, regular rhythm, normal heart sounds and intact distal pulses.  Exam reveals no gallop and no friction rub.    No murmur heard.  Pulmonary/Chest: Effort normal and breath sounds normal. No respiratory distress. He has no wheezes. He has no rales. He exhibits  no tenderness.   Abdominal: Soft. Bowel sounds are normal. He exhibits no distension and no mass. There is no tenderness. There is no rebound and no guarding.   Musculoskeletal: Normal range of motion. He exhibits no edema or deformity.   Protective Sensation (w/ 10 gram monofilament):  Right: Intact  Left: Intact    Visual Inspection:  Normal -  Bilateral and Nails Intact - without Evidence of Foot Deformity- Bilateral    Pedal Pulses:   Right: Present  Left: Present    Posterior tibialis:   Right:Present  Left: Present     Lymphadenopathy:     He has no cervical adenopathy.   Neurological: He is alert and oriented to person, place, and time. He has normal reflexes. He displays normal reflexes.   Skin: He is not diaphoretic. No erythema. No pallor.   Psychiatric: He has a normal mood and affect. His behavior is normal. Judgment and thought content normal.         Labs:    UA, metabolic panel, lipids, and A1C ordered today         Assessment:     30yo M w/ DM2 presents for f/u for the first time since 5/2017. His A1C was 12.9 upon establishing care with me in 8/15, now fasting 127 in house last A1c was 9.2. This will likely be my last time seeing him as a resident as I will graduate soon but will transfer care and have him f/u in 3mo.       Plan:       1. Type 2 diabetes mellitus with hyperglycemia, with long-term current use of insulin  \  - Ambulatory consult to Diabetic Education  - Ambulatory referral to Optometry  - Creatinine, urine, random  - Microalbumin/creatinine urine ratio  - URINALYSIS  - Magnesium; Future  - Comprehensive metabolic panel; Future  - Hemoglobin A1c; Future  - Lipid panel; Future    2. Hyperlipidemia, unspecified hyperlipidemia type  - Continue lipitor 40 qd   - Lipid panel; Future    RTC in 3mo     This patient was seen and plan reviewed with Dr. Carter    It was a pleasure caring for this patient.    Ry Lindquist M.D.  [facility] PGY3

## 2018-04-25 NOTE — PROGRESS NOTES
Diabetes Education  Author: Leona Parmar RD  Date: 4/25/2018    Diabetes Education Visit  Diabetes Education Record Assessment/Progress: Initial    Diabetes Type  Diabetes Type : Type II    Diabetes History  Diabetes Diagnosis: 5-10 years (last A1c from May 2017 - 9.2%)    Nutrition  Meal Planning: 3 meals per day (trying to stay away from fried foods, bread, pasta, potatoes, sweets, red meats)  What type of beverages do you drink?: regular soda/tea, diet soda/tea (used to drink a lot of SSB but has been cutting back)  Meal Plan 24 Hour Recall - Breakfast:  (sometimes misses; oatmeal)  Meal Plan 24 Hour Recall - Lunch:  (chicken salad with crackers; OR grilled chicken wrap (chick romain a), grilled chicken nuggets)  Meal Plan 24 Hour Recall - Dinner:  (usually cereal)  Meal Plan 24 Hour Recall - Snack:  (used to like a lot of candy; yesterday: smoothie dilcia smoothie (slim n trim))    Monitoring   Monitoring: Accu-check   Self Monitoring :  (SMBG not checking; yesterday fasting 122, 3hours after: 141)  Blood Glucose Logs: No  Do you use a personal glucose monitor?: NO    In the last month, how often have you had a low blood sugar reaction?: never  Can you tell when your blood sugar is too high?: yes    Exercise   Exercise Type: use exercise equipment (gym every other day - weights and cardio)    Current Diabetes Treatment   Current Treatment: Oral Medication (metformin 1000 mg BID; Lantus 60 units daily (takes in AM))    Social History  Preferred Learning Method: Face to Face  Primary Support: Self  Smoking Status: Never a Smoker    Barriers to Change  Barriers to Change: None  Learning Challenges : None    Readiness to Learn   Readiness to Learn : Acceptance    Cultural Influences  Cultural Influences: No      Diabetes Education Assessment/Progress  Diabetes Disease Process (diabetes disease process and treatment options): Discussion, Instructed, Individual Session, Written Materials Provided, Comprehends Key Points  (Briefly discussed Type 1 vs Type 2 diabetes - some small question of HILDA based on hx of dx and consistently high A1c despite insulin use. Pt does report family hx of Type 2 DM, and he does admit to lapses in taking insulin/medication since dx; will complete Type 1 labs this weekend. Discussed treatment options if not Type 1, including addition of other orals in order to decrease amount of insulin taken.)    Nutrition (Incorporating nutritional management into one's lifestyle): Discussion, Instructed, Individual Session, Written Materials Provided, Comprehends Key Points (Instructed on carb vs non-carb foods and encouraged pt to start reading labels for Total Carb; he has previously been looking at sugars. Emphasized importance of eliminating all SSB. )    Physical Activity (incorporating physical activity into one's lifestyle): Discussion, Instructed, Individual Session, Written Materials Provided, Comprehends Key Points (Currently meeting PA recommendations - encouraged continuance and increased cardio. Discussed goals and benefits of regular PA.)    Medications (states correct name, dose, onset, peak, duration, side effects & timing of meds): Discussion, Instructed, Individual Session, Written Materials Provided, Comprehends Key Points (Reviewed timing and MOA of metformin. Discussed timing and MOA of long acting insulin; encouraged consistent time from day to day, and instructed on schedule change from AM to PM. Reviewed site rotation and appropriate storage of insulin. Discussed splitting dose of lantus to 30 units BID; pt has been instructed to do this in the past, but he prefers once daily.)    Monitoring (monitoring blood glucose/other parameters & using results): Discussion, Instructed, Individual Session, Written Materials Provided, Comprehends Key Points (Hasn't been testing in the past but reports he re-started yesterday. Reviewed goal BG's and discussed differences in BG at different times of day and  in relation to meals. Instructed pt to test BG 2, preferrably 3, times daily and keep written logs.)    Acute Complications (preventing, detecting, and treating acute complications): Discussion, Instructed, Individual Session, Written Materials Provided, Comprehends Key Points (He does not report any lows - discussed symptoms to watch for. Reviewed hyper symptoms and how to appropriately treat.)    Chronic Complications (preventing, detecting, and treating chronic complications): Discussion, Individual Session, Instructed, Written Materials Provided, Comprehends Key Points (Discussed possible future lifelong complications from uncontrolled DM at young age. Discussed annual care schedule.)    Clinical (diabetes, other pertinent medical history, and relevant comorbidities reviewed during visit): Discussion (Dx around age 27 yo; reports he was a little heavier at that time - around 270#. He reports at time of dx: he was admitted to hosp (likely DKA) for 2 days and has been taking insulin ever since. At time of dx, was experiencing polyuria, polydypsia, and weight loss. Lowest A1c in past was 8.6%.)    Cognitive (knowledge of self-management skills, functional health literacy): Discussion (Arrives with some prior knowledge of diabetes self-management, but would benefit from further education.)    Diabetes Distress and Support Systems: Discussion    Behavioral (readiness for change, lifestyle practices, self-care behaviors): Discussion (Appears motivated for change.)        Goals  Patient has selected/evaluated goals during today's session: Yes, selected    Healthy Eating: Set (Eliminate all SSB)  Start Date: 04/25/18         Diabetes Care Plan/Intervention  Education Plan/Intervention: Individual Follow-Up DSMT    Diabetes Meal Plan  Restrictions: Restricted Carbohydrate    Education Units of Time   Time Spent: 60 min    Health Maintenance was reviewed today with patient. Discussed with patient importance of routine eye  exams, foot exams/foot care, blood work (i.e.: A1c, microalbumin, and lipid), dental visits, yearly flu vaccine, and pneumonia vaccine as indicated by PCP. Patient verbalized understanding.     Health Maintenance Topics with due status: Not Due       Topic Last Completion Date    TETANUS VACCINE 05/25/2016    Pneumococcal PPSV23 (Medium Risk) 05/25/2016     Health Maintenance Due   Topic Date Due    Eye Exam  08/19/2017    Lipid Panel  11/12/2017    Hemoglobin A1c  11/20/2017    Foot Exam  05/15/2018

## 2018-04-28 ENCOUNTER — LAB VISIT (OUTPATIENT)
Dept: LAB | Facility: HOSPITAL | Age: 33
End: 2018-04-28
Payer: COMMERCIAL

## 2018-04-28 DIAGNOSIS — E11.65 TYPE 2 DIABETES MELLITUS WITH HYPERGLYCEMIA, WITH LONG-TERM CURRENT USE OF INSULIN: ICD-10-CM

## 2018-04-28 DIAGNOSIS — Z79.4 TYPE 2 DIABETES MELLITUS WITH HYPERGLYCEMIA, WITH LONG-TERM CURRENT USE OF INSULIN: ICD-10-CM

## 2018-04-28 DIAGNOSIS — R73.09 ELEVATED HEMOGLOBIN A1C: ICD-10-CM

## 2018-04-28 LAB
ALBUMIN SERPL BCP-MCNC: 3.8 G/DL
ALP SERPL-CCNC: 132 U/L
ALT SERPL W/O P-5'-P-CCNC: 30 U/L
ANION GAP SERPL CALC-SCNC: 10 MMOL/L
AST SERPL-CCNC: 27 U/L
BILIRUB SERPL-MCNC: 0.4 MG/DL
BUN SERPL-MCNC: 10 MG/DL
CALCIUM SERPL-MCNC: 9.1 MG/DL
CHLORIDE SERPL-SCNC: 107 MMOL/L
CHOLEST SERPL-MCNC: 107 MG/DL
CHOLEST/HDLC SERPL: 3.5 {RATIO}
CO2 SERPL-SCNC: 24 MMOL/L
CREAT SERPL-MCNC: 0.8 MG/DL
EST. GFR  (AFRICAN AMERICAN): >60 ML/MIN/1.73 M^2
EST. GFR  (NON AFRICAN AMERICAN): >60 ML/MIN/1.73 M^2
ESTIMATED AVG GLUCOSE: 269 MG/DL
GLUCOSE SERPL-MCNC: 140 MG/DL
GLUCOSE SERPL-MCNC: 140 MG/DL
HBA1C MFR BLD HPLC: 11 %
HDLC SERPL-MCNC: 31 MG/DL
HDLC SERPL: 29 %
LDLC SERPL CALC-MCNC: 67.6 MG/DL
MAGNESIUM SERPL-MCNC: 1.8 MG/DL
NONHDLC SERPL-MCNC: 76 MG/DL
POTASSIUM SERPL-SCNC: 4.1 MMOL/L
PROT SERPL-MCNC: 7 G/DL
SODIUM SERPL-SCNC: 141 MMOL/L
TRIGL SERPL-MCNC: 42 MG/DL

## 2018-04-28 PROCEDURE — 83036 HEMOGLOBIN GLYCOSYLATED A1C: CPT

## 2018-04-28 PROCEDURE — 86341 ISLET CELL ANTIBODY: CPT

## 2018-04-28 PROCEDURE — 86341 ISLET CELL ANTIBODY: CPT | Mod: 91

## 2018-04-28 PROCEDURE — 83735 ASSAY OF MAGNESIUM: CPT

## 2018-04-28 PROCEDURE — 84681 ASSAY OF C-PEPTIDE: CPT

## 2018-04-28 PROCEDURE — 36415 COLL VENOUS BLD VENIPUNCTURE: CPT

## 2018-04-28 PROCEDURE — 80061 LIPID PANEL: CPT

## 2018-04-28 PROCEDURE — 80053 COMPREHEN METABOLIC PANEL: CPT

## 2018-04-28 PROCEDURE — 82947 ASSAY GLUCOSE BLOOD QUANT: CPT

## 2018-04-30 ENCOUNTER — PATIENT MESSAGE (OUTPATIENT)
Dept: DIABETES | Facility: CLINIC | Age: 33
End: 2018-04-30

## 2018-04-30 LAB — C PEPTIDE SERPL-MCNC: 0.94 NG/ML

## 2018-05-02 LAB — PANC ISLET CELL IGG SER-ACNC: NORMAL

## 2018-05-03 LAB — GAD65 AB SER-SCNC: 0 NMOL/L

## 2018-05-08 ENCOUNTER — PATIENT MESSAGE (OUTPATIENT)
Dept: DIABETES | Facility: CLINIC | Age: 33
End: 2018-05-08

## 2018-05-08 ENCOUNTER — OFFICE VISIT (OUTPATIENT)
Dept: OPTOMETRY | Facility: CLINIC | Age: 33
End: 2018-05-08
Payer: COMMERCIAL

## 2018-05-08 DIAGNOSIS — H35.413 LATTICE DEGENERATION OF PERIPHERAL RETINA, BILATERAL: ICD-10-CM

## 2018-05-08 DIAGNOSIS — H02.402 PTOSIS, LEFT EYELID: ICD-10-CM

## 2018-05-08 DIAGNOSIS — Z79.4 TYPE 2 DIABETES MELLITUS WITH HYPERGLYCEMIA, WITH LONG-TERM CURRENT USE OF INSULIN: Primary | ICD-10-CM

## 2018-05-08 DIAGNOSIS — E11.65 TYPE 2 DIABETES MELLITUS WITH HYPERGLYCEMIA, WITH LONG-TERM CURRENT USE OF INSULIN: Primary | ICD-10-CM

## 2018-05-08 PROCEDURE — 92014 COMPRE OPH EXAM EST PT 1/>: CPT | Mod: S$GLB,,, | Performed by: OPTOMETRIST

## 2018-05-08 PROCEDURE — 99999 PR PBB SHADOW E&M-EST. PATIENT-LVL II: CPT | Mod: PBBFAC,,, | Performed by: OPTOMETRIST

## 2018-05-08 NOTE — Clinical Note
Dear Dr. Lindquist,  Thank you for referring Mr. Zafar for a diabetic eye examination; there is no retinopathy and I will continue to monitor yearly. Please let me know if you have questions.  Sincerely, Rebecca Gardner OD

## 2018-05-08 NOTE — PROGRESS NOTES
HPI     Mr. Fco Zafar III was referred by Ry Lindquist MD for a   diabetic eye exam.    Patient reports no vision or eye complaints today. Patient declines   refraction.    (-)drops  (-)flashes  (-)floaters  (-)diplopia    Diabetic yes   Hemoglobin A1C       Date                     Value               Ref Range             Status           04/28/2018               11.0 (H)            4.0 - 5.6 %         Final  05/20/2017               9.2 (H)             4.5 - 6.2 %         Final  02/27/2017               8.9 (H)             4.5 - 6.2 %         Final      OCULAR HISTORY  Last Eye Exam 08/16/16 with Dr. Wilder   (-)eye surgery   (-)diagnosed or treated for any eye conditions or diseases none     FAMILY HISTORY  (-)Glaucoma none       Last edited by Rebecca Gardner, OD on 5/8/2018 11:53 AM. (History)            Assessment /Plan     For exam results, see Encounter Report.    Type 2 diabetes mellitus with hyperglycemia, with long-term current use of insulin   Uncontrolled. No retinopathy OU. Stressed importance of strict blood glucose control. Encouraged pt to increase aerobic exercise to at least 4 days per week and continue diet changes (recommended minimizing sugary cereals). Monitor with yearly DFE, or RTC sooner with any vision changes.     Lattice degeneration of peripheral retina, bilateral   Not previously noted, asymptomatic OU. Discussed potential future risk for retinal breaks/detachments. Reviewed signs and symptoms of a retinal detachment, pt understands to return to clinic immediately with any new floaters, flashes of light, or a veil over vision.  Monitor with yearly DFE.    Ptosis, left eye lid   Longstanding and stable per pt. Normal pupils and extraocular muscles today OU. Monitor.        RTC 1 year

## 2018-05-08 NOTE — PATIENT INSTRUCTIONS
Diabetes: Meal Planning  You can help keep your blood sugar level in your target range by eating healthy foods. Your healthcare team can help you create a low-fat, nutritious meal plan. Take an active role in your diabetes management by following your meal plan and working with your healthcare team.    Make your meal plan  A meal plan gives guidelines for the types and amounts of food you should eat. The goal is to balance food and insulin (or other diabetes medications) so your blood sugars will be in your target range. Your dietitian will help you make a flexible meal plan that includes many foods that you like.  Watch serving sizes  Your meal plan will group foods by servings. To learn how much a serving is, start by measuring food portions at each meal. Soon youll know what a serving looks like on your plate. Ask your healthcare provider about how to balance servings of different foods.  Eat from all the food groups  The basis of a healthy meal plan is variety (eating lots of different foods). Choose lean meats, fresh fruits and vegetables, whole grains, and low-fat or nonfat dairy products. Eating a wide variety of foods provides the nutrients your body needs. It can also keep you from getting bored with your meal plan.  Learn about carbohydrates, fats, and protein  · Carbohydrates are starches, sugars, and fiber. They are found in many foods, including fruit, bread, pasta, milk, and sweets. Of all the foods you eat, carbohydrates have the most effect on your blood sugar. Your dietitian may teach you about carb counting, a way to figure out the number of carbohydrates in a meal.  · Fats have the most calories. They also have the most effect on your weight and your risk of heart disease. When you have diabetes, its important to control your weight and protect your heart. Foods that are high in fat include whole milk, cheese, snack foods, and desserts.  · Protein is important for building and repairing  "muscles and bones. Choose low-fat protein sources, such as fish, egg whites, and skinless chicken.  Reduce liquid sugars  Extra calories from sodas, sports drinks, and fruit drinks make it hard to keep blood sugar in range. Cut as many liquid sugars from your meal plan as you can.  This includes most fruit juices, which are often high in natural or added sugar. Instead, drink plenty of water and other sugar-free beverages.  Eat less fat  If you need to lose weight, try to reduce the amount of fat in your diet. This can also help lower your cholesterol level to keep blood vessels healthier. Cut fat by using only small amounts of liquid oil for cooking. Read food labels carefully to avoid foods with unhealthy trans fats.  Timing your meals  When it comes to blood sugar control, when you eat is as important as what you eat. You may need to eat several small meals spaced evenly throughout the day to stay in your target range. So dont skip breakfast or wait until late in the day to get most of your calories. Doing so can cause your blood sugar to rise too high or fall too low.    © 7642-2039 [employer]. [address] [place] [address]. All rights reserved. This information is not intended as a substitute for professional medical care. Always follow your healthcare professional's instructions.           Diabetes: Shopping for and Preparing Meals  Having diabetes doesnt mean you have to shop in a special aisle or look for special foods. But you will need to make choices. By comparing items and reading food labels, you can find the healthiest foods for you and your family.  Comparing items    When you shop, compare items to find the best ones for your needs. Keep these facts in mind:  · No sugar added does not mean a product is sugar-free.  · "Sugar-free" means less than 1/2 gram (g) of sugar per serving.  · Fat free means less than 1/2 g of fat per serving. This does not necessarily mean the " product is low in calories.  · Low fat means 3 g fat or less per serving. Reduced fat or less fat means 25% less fat than the regular version. Some of this fat may be saturated or trans fat. And calories per serving may be similar to the regular version.  Making small changes  Dont try to change all of your eating habits at once. Here are some ideas to start with:  · Try fat-free or low-fat cheese, milk, and yogurt. Also try leaner cuts of meat. This will help you cut down on saturated fat.  · Try whole-grain breads, brown rice, and whole-wheat pasta.  · Load up on fresh or frozen vegetables. If you buy canned, choose low-sodium vegetables.  · Avoid processed foods as much as possible. They tend to be low in fiber and high in trans fats and sodium.  · Try tofu, soymilk, or meat substitutes.?They can help you cut cholesterol and saturated fat out of your diet.  Learning to read food labels  To find healthy foods that help you control blood sugar, learn how to read food labels. Look for the Nutrition Facts label on packaged foods. It will tell you how much carbohydrate, sugar, fat, and fiber is in each serving. Then, you can decide whether or not the food fits into your meal plan.  Using the food label  So, once you have the food label, what do you do with it? The food label helps in many ways. Use it to:  · Compare items and decide which is the best for your health needs.  · Track the number of carbohydrates in your portions.  · Figure out how many servings of a food you can have and still stay within the number of carbohydrates for that meal.  Planning meals  For good blood sugar control, plan what and when youll eat. Start by creating a meal plan that includes all the food groups. Then, time your meals to help keep your blood sugar level steady. You may need to adjust your plan for special situations.  Eat from all the food groups  The basis of a healthy meal plan is variety (eating many different types of  foods). Look for lean meats, fresh fruits and vegetables, whole grains, and low-fat or non-fat dairy products. Eating a wide variety of foods provides the nutrients your body needs. It can also keep you from getting bored with your meal plan.  Reduce liquid sugars  Extra calories from sodas, sports drinks, and fruit drinks make it hard to keep blood sugar in range. Cut as many liquid sugars from your meal plan as you can. This includes most fruit juices, which are often high in natural or added sugar. Instead, drink plenty of water and other sugar-free beverages.  Eat less fat  If you need to lose some weight, try to reduce the amount of fat in your diet. This can also help lower your cholesterol level to keep blood vessels healthier. Cut fat by using only small amounts of liquid oil for cooking. Read food labels carefully to avoid foods with unhealthy trans fats.  Timing your meals  When it comes to blood sugar control, when you eat is as important as what you eat. You may need to eat several small meals spaced evenly throughout the day to stay in your target range. So dont skip breakfast or wait until late in the day to get most of your calories. Doing so can cause your blood sugar to rise too high or fall too low.  Cooking wisely      Aim for meals that include foods from all the food groups.     · Broil, steam, bake, or grill meats and vegetables, instead of frying.  · Instead of cream-based sauces or sugary glazes, flavor foods with vegetable purée, lemon or lime juice, or herb seasonings.  · Remove skin from chicken and turkey before serving.  · Look in cookbooks for easy, low-fat, low-sugar recipes. When making your usual recipes, cut sugar by 1/2 and fat by 1/3.  © 6357-6760 [employer]. [address] [place] [address], [place] [address]. All rights reserved. This information is not intended as a substitute for professional medical care. Always follow your healthcare professional's  instructions.    ==============================================          Diabetes: The Benefits of Exercise  Exercise can lower blood sugar, help control weight, and boost your mood. It can also improve blood flow, lower blood pressure, and improve heart health. Even a small amount of regular activity can have a big impact on your health.      Take the stairs whenever you can.      What can exercise help?  · Blood sugar. Regular exercise improves blood sugar control by helping your body use insulin.  · Mental and emotional health. Physical activity relieves stress and helps you sleep better.  · Heart health. With regular exercise, you can reduce your risk of heart disease and high blood pressure. You can also improve your cholesterol and triglyceride levels.  · Weight. Exercise helps you lose fat, gain muscle, and control your weight.  · Health of blood vessels and nerves. Activity helps lower blood sugar. This helps prevent damage to blood vessels and nerves that can cause problems with your brain, eyes, feet, and legs.  · Finances. If you manage your blood sugar, you may spend less on medical care.  2 types of exercise  Two types of exercise help your body use blood sugar. Experts advise both types of exercise for people with diabetes:  · Aerobic exercise. This is a rhythmic, repeated, continued movement of large muscle groups for at least 10 minutes at a time. Examples include walking, bicycling, jogging, swimming, water aerobics, and many sports.  · Resistance exercise (strength training). This type of exercise uses muscles to move weight or work against resistance. You can do it with free weights, machines, resistance tubing, or your own body weight.  A goal to shoot for  Your main goal is to become more active. Even a little bit helps. Choose an activity that you like. Walking is one great form of exercise that everyone can do. Talk to your doctor about any limits you may have before starting with an exercise  program. Then aim for 40 minutes of moderate to high intensity physical activity on at least 3 to 4 days each week.   Getting activity into your day  Being more active doesnt have to be hard work. Try these to get more activity into your day:  · Take the stairs instead of the elevator  · Garden, do housework, and yard work  · Choose a parking space farther from the store  · Walk to talk to a co-worker instead of calling  · Take a 10-minute walk around the block at lunch  · Walk to a bus stop a little farther from your home or office  · Walk the dog after dinner  © [id number] [employer]. [address] [place] [address], [place] [address]. All rights reserved. This information is not intended as a substitute for professional medical care. Always follow your healthcare professional's instructions.           Diabetes: Getting Started with Exercise  Getting started is easier than you think. Simple and small movements can get you started on a regular exercise routine. You dont need to join a gym to start moving. Choose an activity you enjoy. Start slowly and set small goals. Work activity into your daily life. Talk to your health care provider before starting an activity program. You may need to have a checkup before you begin.    Start with Movement  If youre not used to being active, start with gentle movements while you watch TV. Raise your arms and legs while seated. Then repeat for 5 to 10 minutes. With time, add some slow walking. Even taking a flight of stairs instead of the elevator can lift you to healthier heights. These types of brief activities are great ways to get started. They can help lower your blood sugar level, strengthen your heart, and improve your energy.  Steps Toward Being More Active  Your goal, especially at first, is to keep your activity simple. Slowly work up to 30 minutes of activity a day. But you dont need to do it all at once. You can be active in 3, 10-minute sessions a day. You  can also combine being active with the other things you need to do. For instance, stand up from your desk and walk around often when at work. Or, go for a walk around the mall before you shop.  Keep Your Activity Simple  Why make activity hard on yourself? Choose things that you like to do and that fit into your schedule. Here are some tips:  · Get off the bus a stop or 2 early and walk the rest of the way.  · Run small shopping errands on your bike.  · Go for a 10-minute walk after each meal.  · Park your car in the space farthest from where youre going.  · Get a pedometer that records the number of steps you take. Make a goal for the number of steps you take each day. Increase your goal a little each week.  Keep Your Activity Safe  · Be sure to warm up before you start and cool down when youre done.  · Carry or wear identification that says that you have diabetes.  · Eat 1 to 2 hours before you exercise, if instructed.  · Check your blood sugar before and after you exercise, if instructed. Check your blood sugar if you feel symptoms.  · Carry fast-acting sugar with you in case you have low blood sugar.  · Wear socks and well-fitting shoes.  · Think about the weather in your area. At times, you may need to choose indoor rather than outdoor activities.  Make Your Activity Fun  Mix fitness with fun. The more fun you have, the more likely you are to stick to your plan. You can have a better blood sugar level along with an active, fun day. Try these hints:  · Choose an exercise that you enjoy and can do easily.  · Join a social club that goes for walks or does other physical activities.  · Go bird watching or do something else that gets you outdoors.  · Put on some music and dance.  · Involve your family or friends in your physical activity.  © 9917-7374 [employer]. [address] [place] [address], [place] [address]. All rights reserved. This information is not intended as a substitute for professional medical  care. Always follow your healthcare professional's instructions.             Diabetes: Activity Tips  Being more active can help you manage your diabetes. The tips on this sheet can help you get the most from your exercise. They can also help you stay safe.    Benefit from Briskness  Brisk activity gets your heart beating faster. This can help you increase your fitness, lose extra weight, and manage your blood sugar level. Try brisk walking. Or, if you have foot or leg problems, you can try swimming or bike riding. You can break up your exercise into chunks throughout the day. Work up to at least 30 minutes of steady, brisk exercise on most days.  Warm Up and Cool Down  Warming up and cooling down reduce your risk of injury. They also help limit muscle soreness. Do a mild version of your activity for 5 minutes before and after your routine. You can also learn stretches that will help keep your muscles loose. Your health care provider may show you good ways to warm up and stretch.  Do the Talk-Sing Test  The talk-sing test is a simple way to tell how hard youre exercising. If you can talk while exercising, youre in a safe range. If youre out of breath, slow down. If you can carry a tune, its time to  the pace. Walk up a hill. Increase the resistance on your stationary bike. Or swim faster.  What About Eating?  You may be told to plan your exercise for 1 to 2 hours after a meal. In most cases, you dont need to eat while being active. If you take insulin or medication that can cause low blood sugar, test your blood sugar before exercising. And carry a fast-acting sugar that will raise your blood sugar level quickly. This includes glucose tablets or hard candy. Use it if you feel low blood sugar symptoms.  Safety Tips  These tips can help you stay safe as you become fit:  · Exercise with a friend or carry a cell phone if you have one.  · Carry or wear identification that says you have diabetes.  · Use the  proper footwear and safety equipment for your activity.  · Drink water before, during, and after exercise.  · Dress properly for the weather.  · Dont exercise in very hot or very cold weather.  · Dont exercise if you are sick.  · If you are instructed to do so, test your blood sugar before and after you exercise.  When to Stop Exercising and Call Your Doctor  Stop exercising and call your doctor right away if you notice any of the following:  · Pain, pressure, tightness, or heaviness in the chest  · Pain or heaviness in the neck, shoulders, back, arms, legs, or feet  · Unusual shortness of breath  · Dizziness or lightheadedness  · Unusually rapid or slow pulse  · Increased joint or muscle pain  · Nausea or vomiting  © 5801-8927 [employer]. [address] [place] [address], [place] [address]. All rights reserved. This information is not intended as a substitute for professional medical care. Always follow your healthcare professional's instructions.

## 2018-05-08 NOTE — LETTER
May 8, 2018      Ry Lindquist MD  [address] [facility] [address]           [facility]  [address]  [facility] [address]  Phone: [phone]          Patient: Fco Zafar III   MR Number: [id number]   YOB: 1985   Date of Visit: 5/8/2018       Dear Dr. Ry Lindquist:    Thank you for referring Fco Zafar to me for evaluation. Attached you will find relevant portions of my assessment and plan of care.    If you have questions, please do not hesitate to call me. I look forward to following Fco Zafar along with you.    Sincerely,    Rebecca Gardner, OD    Enclosure  CC:  No Recipients    If you would like to receive this communication electronically, please contact [email or web address] or [phone] to request more information on [employer] Link access.    For providers and/or their staff who would like to refer a patient to [facility], please contact us through our one-stop-shop provider referral line, [facility], at [phone].    If you feel you have received this communication in error or would no longer like to receive these types of communications, please e-mail [email or web address]

## 2018-06-14 DIAGNOSIS — Z79.4 TYPE 2 DIABETES MELLITUS WITH COMPLICATION, WITH LONG-TERM CURRENT USE OF INSULIN: ICD-10-CM

## 2018-06-14 DIAGNOSIS — Z79.4 TYPE 2 DIABETES MELLITUS WITH HYPERGLYCEMIA, WITH LONG-TERM CURRENT USE OF INSULIN: ICD-10-CM

## 2018-06-14 DIAGNOSIS — E11.65 TYPE 2 DIABETES MELLITUS WITH HYPERGLYCEMIA, WITH LONG-TERM CURRENT USE OF INSULIN: ICD-10-CM

## 2018-06-14 DIAGNOSIS — E11.8 TYPE 2 DIABETES MELLITUS WITH COMPLICATION, WITH LONG-TERM CURRENT USE OF INSULIN: ICD-10-CM

## 2018-06-14 RX ORDER — INSULIN GLARGINE 100 [IU]/ML
25 INJECTION, SOLUTION SUBCUTANEOUS 2 TIMES DAILY
Qty: 15 ML | Refills: 5 | Status: CANCELLED | OUTPATIENT
Start: 2018-06-14

## 2018-06-17 ENCOUNTER — PATIENT MESSAGE (OUTPATIENT)
Dept: INTERNAL MEDICINE | Facility: CLINIC | Age: 33
End: 2018-06-17

## 2018-06-17 DIAGNOSIS — E11.65 TYPE 2 DIABETES MELLITUS WITH HYPERGLYCEMIA, WITH LONG-TERM CURRENT USE OF INSULIN: ICD-10-CM

## 2018-06-17 DIAGNOSIS — Z79.4 TYPE 2 DIABETES MELLITUS WITH COMPLICATION, WITH LONG-TERM CURRENT USE OF INSULIN: ICD-10-CM

## 2018-06-17 DIAGNOSIS — Z79.4 TYPE 2 DIABETES MELLITUS WITH HYPERGLYCEMIA, WITH LONG-TERM CURRENT USE OF INSULIN: ICD-10-CM

## 2018-06-17 DIAGNOSIS — E11.8 TYPE 2 DIABETES MELLITUS WITH COMPLICATION, WITH LONG-TERM CURRENT USE OF INSULIN: ICD-10-CM

## 2018-06-18 RX ORDER — INSULIN GLARGINE 100 [IU]/ML
25 INJECTION, SOLUTION SUBCUTANEOUS 2 TIMES DAILY
Qty: 15 ML | Refills: 5 | Status: SHIPPED | OUTPATIENT
Start: 2018-06-18 | End: 2019-04-02 | Stop reason: SDUPTHER

## 2018-07-23 DIAGNOSIS — E11.8 TYPE 2 DIABETES MELLITUS WITH COMPLICATION, WITH LONG-TERM CURRENT USE OF INSULIN: ICD-10-CM

## 2018-07-23 DIAGNOSIS — Z79.4 TYPE 2 DIABETES MELLITUS WITH COMPLICATION, WITH LONG-TERM CURRENT USE OF INSULIN: ICD-10-CM

## 2018-07-24 RX ORDER — METFORMIN HYDROCHLORIDE 1000 MG/1
1000 TABLET ORAL 2 TIMES DAILY WITH MEALS
Qty: 180 TABLET | Refills: 0 | Status: SHIPPED | OUTPATIENT
Start: 2018-07-24 | End: 2018-10-26 | Stop reason: SDUPTHER

## 2018-07-24 NOTE — TELEPHONE ENCOUNTER
Hi, please call patient -- to set up with new pcp in resident clinic Dr. Kelvin Huizar.  I will send in one prescription of this medicine, but patient needs to be seen for any further prescriptions.  Thank you, Giovani Bergman

## 2018-08-16 ENCOUNTER — PATIENT MESSAGE (OUTPATIENT)
Dept: INTERNAL MEDICINE | Facility: CLINIC | Age: 33
End: 2018-08-16

## 2018-08-16 DIAGNOSIS — E11.65 TYPE 2 DIABETES MELLITUS WITH HYPERGLYCEMIA, WITH LONG-TERM CURRENT USE OF INSULIN: ICD-10-CM

## 2018-08-16 DIAGNOSIS — Z79.4 TYPE 2 DIABETES MELLITUS WITH HYPERGLYCEMIA, WITH LONG-TERM CURRENT USE OF INSULIN: ICD-10-CM

## 2018-08-16 RX ORDER — PEN NEEDLE, DIABETIC 30 GX3/16"
NEEDLE, DISPOSABLE MISCELLANEOUS
Qty: 100 EACH | Refills: 11 | Status: SHIPPED | OUTPATIENT
Start: 2018-08-16 | End: 2019-08-20 | Stop reason: SDUPTHER

## 2018-10-26 DIAGNOSIS — E11.8 TYPE 2 DIABETES MELLITUS WITH COMPLICATION, WITH LONG-TERM CURRENT USE OF INSULIN: ICD-10-CM

## 2018-10-26 DIAGNOSIS — Z79.4 TYPE 2 DIABETES MELLITUS WITH COMPLICATION, WITH LONG-TERM CURRENT USE OF INSULIN: ICD-10-CM

## 2018-10-26 RX ORDER — METFORMIN HYDROCHLORIDE 1000 MG/1
TABLET ORAL
Qty: 180 TABLET | Refills: 0 | Status: SHIPPED | OUTPATIENT
Start: 2018-10-26 | End: 2019-05-05 | Stop reason: SDUPTHER

## 2019-01-14 DIAGNOSIS — E11.8 TYPE 2 DIABETES MELLITUS WITH COMPLICATION, WITH LONG-TERM CURRENT USE OF INSULIN: ICD-10-CM

## 2019-01-14 DIAGNOSIS — Z79.4 TYPE 2 DIABETES MELLITUS WITH COMPLICATION, WITH LONG-TERM CURRENT USE OF INSULIN: ICD-10-CM

## 2019-04-02 DIAGNOSIS — Z79.4 TYPE 2 DIABETES MELLITUS WITH HYPERGLYCEMIA, WITH LONG-TERM CURRENT USE OF INSULIN: ICD-10-CM

## 2019-04-02 DIAGNOSIS — E11.8 TYPE 2 DIABETES MELLITUS WITH COMPLICATION, WITH LONG-TERM CURRENT USE OF INSULIN: ICD-10-CM

## 2019-04-02 DIAGNOSIS — E11.65 TYPE 2 DIABETES MELLITUS WITH HYPERGLYCEMIA, WITH LONG-TERM CURRENT USE OF INSULIN: ICD-10-CM

## 2019-04-02 DIAGNOSIS — Z79.4 TYPE 2 DIABETES MELLITUS WITH COMPLICATION, WITH LONG-TERM CURRENT USE OF INSULIN: ICD-10-CM

## 2019-04-02 RX ORDER — INSULIN GLARGINE 100 [IU]/ML
25 INJECTION, SOLUTION SUBCUTANEOUS 2 TIMES DAILY
Qty: 15 ML | Refills: 5 | Status: SHIPPED | OUTPATIENT
Start: 2019-04-02 | End: 2020-01-28

## 2019-05-05 DIAGNOSIS — Z79.4 TYPE 2 DIABETES MELLITUS WITH COMPLICATION, WITH LONG-TERM CURRENT USE OF INSULIN: ICD-10-CM

## 2019-05-05 DIAGNOSIS — E11.8 TYPE 2 DIABETES MELLITUS WITH COMPLICATION, WITH LONG-TERM CURRENT USE OF INSULIN: ICD-10-CM

## 2019-05-06 ENCOUNTER — OFFICE VISIT (OUTPATIENT)
Dept: OPTOMETRY | Facility: CLINIC | Age: 34
End: 2019-05-06
Payer: COMMERCIAL

## 2019-05-06 ENCOUNTER — PATIENT MESSAGE (OUTPATIENT)
Dept: INTERNAL MEDICINE | Facility: CLINIC | Age: 34
End: 2019-05-06

## 2019-05-06 DIAGNOSIS — Z79.4 CURRENT USE OF INSULIN: ICD-10-CM

## 2019-05-06 DIAGNOSIS — H35.413 LATTICE DEGENERATION OF PERIPHERAL RETINA, BILATERAL: ICD-10-CM

## 2019-05-06 DIAGNOSIS — E11.69 ERECTILE DYSFUNCTION ASSOCIATED WITH TYPE 2 DIABETES MELLITUS: ICD-10-CM

## 2019-05-06 DIAGNOSIS — N52.1 ERECTILE DYSFUNCTION ASSOCIATED WITH TYPE 2 DIABETES MELLITUS: ICD-10-CM

## 2019-05-06 DIAGNOSIS — E11.9 TYPE 2 DIABETES MELLITUS WITHOUT RETINOPATHY: Primary | ICD-10-CM

## 2019-05-06 DIAGNOSIS — H02.402 PTOSIS, LEFT EYELID: ICD-10-CM

## 2019-05-06 LAB
LEFT EYE DM RETINOPATHY: NEGATIVE
RIGHT EYE DM RETINOPATHY: NEGATIVE

## 2019-05-06 PROCEDURE — 99999 PR PBB SHADOW E&M-EST. PATIENT-LVL III: ICD-10-PCS | Mod: PBBFAC,,, | Performed by: OPTOMETRIST

## 2019-05-06 PROCEDURE — 92014 COMPRE OPH EXAM EST PT 1/>: CPT | Mod: S$GLB,,, | Performed by: OPTOMETRIST

## 2019-05-06 PROCEDURE — 99999 PR PBB SHADOW E&M-EST. PATIENT-LVL III: CPT | Mod: PBBFAC,,, | Performed by: OPTOMETRIST

## 2019-05-06 PROCEDURE — 92014 PR EYE EXAM, EST PATIENT,COMPREHESV: ICD-10-PCS | Mod: S$GLB,,, | Performed by: OPTOMETRIST

## 2019-05-06 RX ORDER — METFORMIN HYDROCHLORIDE 1000 MG/1
TABLET ORAL
Qty: 180 TABLET | Refills: 0 | Status: SHIPPED | OUTPATIENT
Start: 2019-05-06 | End: 2020-02-06 | Stop reason: SDUPTHER

## 2019-05-06 NOTE — PATIENT INSTRUCTIONS
FLASHES AND FLOATERS    You've noticed something new in your field of vision, possibly one or more movable spots or squiggles that you can't remember seeing before. Here are some points to follow and some information about this condition:    Located in the hollow interior of the eye is a transparent gel called the VITREOUS. It is composed of microscopic fibers, large molecules, and fluid-like water.  Most people also have small pieces of material suspended within the vitreous, which under ideal lighting conditions will cast a shadow on the retina and become visible as moving spots, which are called vitreous floaters. Floaters become more common with age.    During life the vitreous fibers condense together and the gel becomes more fluid. Fibrous clumps can form which may become large enough to be seen as floaters. As the fibers condense the liquid portion escapes, allowing the vitreous to shrink, separate from the retina, and collapse forward in the eye. While shrinking, the vitreous gel can pull against the retina causing episodes of light flashes or arcs of light.  The retina does not have pain sensors, and its only response is flashes of light. People often describe these flashes as lightning streaks or fireworks.     A concern is that traction on the retina from the shrinking vitreous may pull a tear in the retina, which could evolve into a retinal detachment.  Therefore persistent flashes do require urgent evaluation. Other symptoms that require urgent evaluation are a large shadow, curtain, or blank spot in your vision, or a sudden shower of dozens or hundreds of tiny floaters resembling pepper or soot.    To summarize, nearly everyone has floaters, but a sudden shower of dots, persistent light flashes in one eye, or a curtain or shadow in your vision require urgent consultation.  If these occur, please let us know immediately.     ==============================================        What Is Diabetic  Retinopathy?  Diabetic retinopathy is the leading cause of blindness in adults. It occurs when diabetes harms blood vessels inside the eye. These weak vessels leak fluid into an area of the eye called the retina. Weak new vessels can break and bleed into the retina. Old vessels can leak and cause swelling. These vessels can damage areas of the retina, causing blurry, distorted vision.    What causes diabetic retinopathy?  Diabetes is the cause of this eye disease. Over time, diabetes makes blood vessels weaken all over the body, including in the eyes. Poor blood sugar control can make retinopathy worse. So can smoking, high cholesterol, or poorly controlled high blood pressure. Pregnancy can also cause retinopathy to worsen.  What are the symptoms?  You can have diabetic retinopathy without knowing it. Usually, there is no pain and no outward sign. Over time, you may notice gradual blurring or some vision loss. Symptoms may come and go. Early treatment and good control of risk factors may help prevent vision loss or blindness.  What you can do  Have your eyes examined regularly by an eye specialist. Your healthcare provider will tell you when and how often you need these exams. You can also help control your diabetes through exercise, diet, and medicine, as instructed by your healthcare provider. These same steps may also help control diabetic retinopathy.           Treating Diabetic Retinopathy  Treatment may help slow the progress of diabetic retinopathy. Your treatment plan depends on your condition. It may include frequent exams to monitor your condition, laser treatment, and other procedures.      Laser is one type of treatment that may help limit vision loss from diabetic retinopathy.      Monitoring Your Vision  At first, your doctor may simply want to monitor your vision. In some cases, you may also have an angiogram. This test uses a special dye to create detailed images of the retina. These images help  your doctor decide whether special treatments are needed. You may also have ocular coherence tomography (OCT) testing. This uses light waves to see if there is fluid leaking into certain parts of the eye. It can also measure the thickness of the retina.  Types of Treatment  Special treatments can help stop bleeding, slow or stop new vessel growth, and preserve vision. The type of treatment you get depends on your condition:  · Laser treatment can help stop leaks and limit abnormal vessel growth.  · Surgery can remove the vitreous or repair a retina that is damaged by scar tissue formation. This may help if the vitreous becomes filled with blood and obscures your vision.  · Cryotherapy shrinks blood vessels and repairs the retina.  · Medications injected in the eye can help decrease swelling of the retina or slow the abnormal growth of blood vessels.   ·   © 6451-1969 [employer]. [address] [place] [address], [place] [address]. All rights reserved. This information is not intended as a substitute for professional medical care. Always follow your healthcare professional's instructions.         Managing Type 2 Diabetes  Type 2 diabetes is a long-term (chronic) condition. Managing your diabetes means making some changes that may be hard. Your health care provider, nurse, diabetes educator, and others can help you.  Managing type 2 diabetes means balancing your medicine with diet and activity. Managing your diabetes may also include checking your blood sugar. And, working with your health care provider to prevent complications.  Take your medicine as prescribed  You may take pills (oral medicine) or give yourself shots (insulin injections) for diabetes. Or you may use both. Taking your medicines or giving yourself insulin at the right times will help you control your blood sugar. Think about ways that will help you remember to take your medicines the right way every day. Ask your health care provider, nurse, or  diabetes educator for ideas.  Although you may only take pills for your diabetes, this may change. Over time, most people with type 2 diabetes also use insulin.  Eat healthy  A healthy, well-planned diet helps control the amount of sugar in your blood. It also helps you stay at a healthy weight or lose weight, if you are overweight. Extra weight makes it harder to control diabetes.  Your health care provider, nurse, a dietitian, or diabetes educator will help you create a plan that works for you. You don't have to give up all the foods you like. Having meals and snacks with vegetables, fruits, lean meats, or other healthy proteins, whole grains, and low- or no-fat dairy products will help control your blood sugar.  Be physically active  Being active helps lower your blood sugar. It does this by helping your body use insulin to turn food into energy. Activity also helps you manage your weight:  Ask your health care provider to work with you to create an activity program that's right for you. Your activity programs is based on your age, general health, and types of activity you enjoy. You should start slowly, but aim for at least 30 minutes of exercise or activity on most days.  Check your blood sugar  Checking your own blood sugar may be a regular part of your care. Or you may only check your blood sugar from time to time. Your health care provider will give you instructions about checking your blood sugar at home. Checking it tells you if your blood sugar is in your target range. A target range means that you are managing your diabetes well.  If your blood sugar levels are too high or too low, your health care provider may suggest changes to your diet or activity level. He or she may also adjust your medication.  Your health care provider may also tell you to check your blood sugar more often when you are sick. At certain times, for example, when you have a cold or the flu, you may need to check it more often.  Take  care of yourself  When you have diabetes, you may be more likely to develop other health problems. They include foot, eye, heart, and kidney problems. By controlling your blood sugar, and taking good care of yourself, you can help prevent these problems. Your health care provider, nurse, diabetes educator, and others can assist you.  · Checkups. You should have regular checkups with your health care provider. At those visits, you will have a physical exam that includes checking your feet. Your health care provider will also check your blood pressure and weight.  · Other exams. You should also have complete eye, foot, and dental exams at least once every year.  · Lab tests. You will have blood and urine tests.  ¨ At least 2 times a year, your health care provider will check your hemoglobin A1C. This blood test shows how well you have been controlling your blood sugar over 2 to 3 months. The results help your health care provider manage your diabetes.    ¨ You will also have other lab tests. For example, to check for kidney problems and abnormal cholesterol levels.  · Smoking. If you smoke, you will need to quit. Smoking increases the chance that you will develop complications from diabetes. Ask your health care provider about ways to quit.  · Vaccines. Get a yearly flu shot. And, ask your health care provider about vaccines to prevent pneumonia and hepatitis B.  Stress and depression  Most people have challenges throughout their lives. Living with diabetes, or any serious condition, can increase your stress and make you feel a lot of different emotions. In diabetes, feeling stressed or depressed can actually affect your blood sugar levels.  If you are having trouble dealing with diabetes, tell your health care provider. He or she can help or refer you to other health care providers or programs.  Support and resources  Know where you can get help. You can try the following:  · Support. Ask family and friends to  support your effects to take care of yourself. Or, look for a diabetes support group locally or on the internet. (Check the Connect with Others link on www.diabetes.org)  · Counseling. Talk with a , psychologist, psychiatrist, or other counselor.  · Information. Contact the American Diabetes Association at 280-214-5367 or www.diabetes.org      © 3392-1322 [employer]. [address] [place] [address], [place] [address]. All rights reserved. This information is not intended as a substitute for professional medical care. Always follow your healthcare professional's instructions.

## 2019-05-06 NOTE — Clinical Note
Dear Dr. Lindquist,Thank you for referring Mr. Zafar for a diabetic eye examination; there is no retinopathy and I will continue to monitor yearly. Please let me know if you have questions.Sincerely,Rebecca Gardner OD

## 2019-05-06 NOTE — PROGRESS NOTES
HPI     Mr. Fco Zafar III was referred by Ry Lindquist MD for a   diabetic eye exam.    Patient denies any eye concerns today. He is just trying to keep up with   his check-ups. He is happy with both his distance and near vision without   correction and is not interested in glasses (he declines refraction).    (-)drops  (-)flashes  (+)floaters: not sure which eye, happens rarely  (-)diplopia  (-)peripheral vision obscurations     (+)Diabetes  LBS: few weeks ago 254  Hemoglobin A1C       Date                     Value               Ref Range             Status           04/28/2018               11.0 (H)            4.0 - 5.6 %         Final  05/20/2017               9.2 (H)             4.5 - 6.2 %         Final  02/27/2017               8.9 (H)             4.5 - 6.2 %         Final    OCULAR HISTORY  Last Eye Exam 05/08/19 with Dr. Gardner  (-)eye surgery   Lattice degeneration OU  Ptosis OS    FAMILY HISTORY  (-)Glaucoma         Last edited by Rebecca Gardner, OD on 5/6/2019  3:05 PM. (History)            Assessment /Plan     For exam results, see Encounter Report.    Type 2 diabetes mellitus without retinopathy  Current use of insulin   No retinopathy noted OU. Monitor with yearly DFE.     Lattice degeneration of peripheral retina, bilateral   Stable and asymptomatic. Reviewed signs and symptoms of a retinal detachment, pt understands to return to clinic immediately with any new floaters, flashes of light, or a veil over vision.  Monitor with yearly DFE.    Ptosis, left eyelid   Longstanding and stable. Monitor.      RTC 1 year

## 2019-05-13 DIAGNOSIS — Z79.4 TYPE 2 DIABETES MELLITUS WITH COMPLICATION, WITH LONG-TERM CURRENT USE OF INSULIN: ICD-10-CM

## 2019-05-13 DIAGNOSIS — E78.5 HYPERLIPIDEMIA, UNSPECIFIED HYPERLIPIDEMIA TYPE: ICD-10-CM

## 2019-05-13 DIAGNOSIS — I10 ESSENTIAL HYPERTENSION: ICD-10-CM

## 2019-05-13 DIAGNOSIS — E11.8 TYPE 2 DIABETES MELLITUS WITH COMPLICATION, WITH LONG-TERM CURRENT USE OF INSULIN: ICD-10-CM

## 2019-05-13 RX ORDER — SILDENAFIL 100 MG/1
100 TABLET, FILM COATED ORAL DAILY PRN
Qty: 5 TABLET | Refills: 2 | Status: SHIPPED | OUTPATIENT
Start: 2019-05-13 | End: 2022-06-13 | Stop reason: SDUPTHER

## 2019-05-13 RX ORDER — LISINOPRIL 2.5 MG/1
2.5 TABLET ORAL DAILY
Qty: 90 TABLET | Refills: 3 | Status: SHIPPED | OUTPATIENT
Start: 2019-05-13 | End: 2020-07-10 | Stop reason: SDUPTHER

## 2019-05-13 RX ORDER — ATORVASTATIN CALCIUM 40 MG/1
40 TABLET, FILM COATED ORAL DAILY
Qty: 90 TABLET | Refills: 3 | Status: SHIPPED | OUTPATIENT
Start: 2019-05-13 | End: 2020-02-06 | Stop reason: SDUPTHER

## 2019-08-20 DIAGNOSIS — Z79.4 TYPE 2 DIABETES MELLITUS WITH HYPERGLYCEMIA, WITH LONG-TERM CURRENT USE OF INSULIN: ICD-10-CM

## 2019-08-20 DIAGNOSIS — E11.65 TYPE 2 DIABETES MELLITUS WITH HYPERGLYCEMIA, WITH LONG-TERM CURRENT USE OF INSULIN: ICD-10-CM

## 2019-08-21 RX ORDER — PEN NEEDLE, DIABETIC 30 GX3/16"
NEEDLE, DISPOSABLE MISCELLANEOUS
Qty: 100 EACH | Refills: 0 | Status: SHIPPED | OUTPATIENT
Start: 2019-08-21 | End: 2020-03-09

## 2019-08-21 NOTE — TELEPHONE ENCOUNTER
Hi, please call patient -- to set up with new pcp in resident clinic.  I will send in one prescription of this medicine, but patient needs to be seen for any further prescriptions.  Thank you, Giovani Bergman

## 2019-12-03 ENCOUNTER — OFFICE VISIT (OUTPATIENT)
Dept: URGENT CARE | Facility: CLINIC | Age: 34
End: 2019-12-03
Payer: COMMERCIAL

## 2019-12-03 VITALS
RESPIRATION RATE: 18 BRPM | SYSTOLIC BLOOD PRESSURE: 124 MMHG | WEIGHT: 235 LBS | HEIGHT: 67 IN | HEART RATE: 89 BPM | TEMPERATURE: 99 F | DIASTOLIC BLOOD PRESSURE: 77 MMHG | BODY MASS INDEX: 36.88 KG/M2 | OXYGEN SATURATION: 97 %

## 2019-12-03 DIAGNOSIS — J02.9 SORE THROAT: ICD-10-CM

## 2019-12-03 DIAGNOSIS — J06.9 UPPER RESPIRATORY INFECTION WITH COUGH AND CONGESTION: Primary | ICD-10-CM

## 2019-12-03 LAB
CTP QC/QA: YES
CTP QC/QA: YES
FLUAV AG NPH QL: NEGATIVE
FLUBV AG NPH QL: NEGATIVE
S PYO RRNA THROAT QL PROBE: NEGATIVE

## 2019-12-03 PROCEDURE — 99214 OFFICE O/P EST MOD 30 MIN: CPT | Mod: S$GLB,,, | Performed by: NURSE PRACTITIONER

## 2019-12-03 PROCEDURE — 87804 INFLUENZA ASSAY W/OPTIC: CPT | Mod: QW,S$GLB,, | Performed by: NURSE PRACTITIONER

## 2019-12-03 PROCEDURE — 87880 STREP A ASSAY W/OPTIC: CPT | Mod: QW,S$GLB,, | Performed by: NURSE PRACTITIONER

## 2019-12-03 PROCEDURE — 99214 PR OFFICE/OUTPT VISIT, EST, LEVL IV, 30-39 MIN: ICD-10-PCS | Mod: S$GLB,,, | Performed by: NURSE PRACTITIONER

## 2019-12-03 PROCEDURE — 87880 POCT RAPID STREP A: ICD-10-PCS | Mod: QW,S$GLB,, | Performed by: NURSE PRACTITIONER

## 2019-12-03 PROCEDURE — 87804 POCT INFLUENZA A/B: ICD-10-PCS | Mod: QW,S$GLB,, | Performed by: NURSE PRACTITIONER

## 2019-12-03 RX ORDER — PROMETHAZINE HYDROCHLORIDE AND DEXTROMETHORPHAN HYDROBROMIDE 6.25; 15 MG/5ML; MG/5ML
5 SYRUP ORAL 3 TIMES DAILY PRN
Qty: 80 ML | Refills: 0 | Status: SHIPPED | OUTPATIENT
Start: 2019-12-03 | End: 2020-07-29

## 2019-12-03 NOTE — PROGRESS NOTES
Subjective:       Patient ID: Fco Zafar III is a 34 y.o. male.    Vitals:  vitals were not taken for this visit.     Chief Complaint: No chief complaint on file.    Patient presents with head,chest congestion with sore throat since Sunday. 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     Cough   This is a new problem. The current episode started in the past 7 days. The problem has been gradually worsening. The problem occurs constantly. The cough is non-productive. Associated symptoms include a fever, nasal congestion, postnasal drip and a sore throat.       Constitution: Positive for fever.   HENT: Positive for postnasal drip and sore throat.    Respiratory: Positive for cough.        Objective:      Physical Exam      Assessment:       No diagnosis found.    Plan:         There are no diagnoses linked to this encounter.

## 2019-12-03 NOTE — LETTER
December 3, 2019      [facility]  [address]  [facility] [address]  Phone: [phone]  Fax: [phone]       Patient: Fco Zafar   YOB: 1985  Date of Visit: 12/03/2019    To Whom It May Concern:    Beth Zafar  was at [facility] on 12/03/2019. He may return to work/school on 12/5/19 with no restrictions. If you have any questions or concerns, or if I can be of further assistance, please do not hesitate to contact me.    Sincerely,          Jade Franklin, NP

## 2019-12-03 NOTE — PATIENT INSTRUCTIONS
Return to Urgent Care or go to ER if symptoms worsen or fail to improve.  Follow up with PCP as recommended for further management.     Over the counter medications that may be helpful:    Use Afrin in each nare for no longer than 5 days, as it is addictive. It can also dry out your mucous membranes and cause elevated blood pressure.    Use pseudoephedrine (behind the counter) to decongest-- (the little red pills).  Pseudoephedrine 30 mg up to 240 mg /day. It can raise your blood pressure and give you palpitations.  Do not buy any oral medications with phenylephrine as it has not been proven effective as a decongestant at the OTC dose.     Take Ibuprofen or Acetaminophen according to label instructions for fever, throat pain, headache, and body aches    Use warm salt water gargles to ease your throat pain. Warm salt water gargles as needed for sore throat-  1/2 tsp salt to 1 cup warm water, gargle as desired.    Sometimes Nyquil at night is beneficial to help you get some rest, however it is sedating and does have an antihistamine, as well as tylenol.  The Nyquil behind the pharmacy counter also has the decongestant, pseudoephedrine as an additional ingredient.         Viral Upper Respiratory Illness (Adult)  You have a viral upper respiratory illness (URI), which is another term for the common cold. This illness is contagious during the first few days. It is spread through the air by coughing and sneezing. It may also be spread by direct contact (touching the sick person and then touching your own eyes, nose, or mouth). Frequent handwashing will decrease risk of spread. Most viral illnesses go away within 7 to 10 days with rest and simple home remedies. Sometimes the illness may last for several weeks. Antibiotics will not kill a virus, and they are generally not prescribed for this condition.    Home care  · If symptoms are severe, rest at home for the first 2 to 3 days. When you resume activity, don't let  yourself get too tired.  · Avoid being exposed to cigarette smoke (yours or others).  · You may use acetaminophen or ibuprofen to control pain and fever, unless another medicine was prescribed. (Note: If you have chronic liver or kidney disease, have ever had a stomach ulcer or gastrointestinal bleeding, or are taking blood-thinning medicines, talk with your healthcare provider before using these medicines.) Aspirin should never be given to anyone under 18 years of age who is ill with a viral infection or fever. It may cause severe liver or brain damage.  · Your appetite may be poor, so a light diet is fine. Avoid dehydration by drinking 6 to 8 glasses of fluids per day (water, soft drinks, juices, tea, or soup). Extra fluids will help loosen secretions in the nose and lungs.  · Over-the-counter cold medicines will not shorten the length of time youre sick, but they may be helpful for the following symptoms: cough, sore throat, and nasal and sinus congestion. (Note: Do not use decongestants if you have high blood pressure.)  Follow-up care  Follow up with your healthcare provider, or as advised.  When to seek medical advice  Call your healthcare provider right away if any of these occur:  · Cough with lots of colored sputum (mucus)  · Severe headache; face, neck, or ear pain  · Difficulty swallowing due to throat pain  · Fever of 100.4°F (38°C)  Call 911, or get immediate medical care  Call emergency services right away if any of these occur:  · Chest pain, shortness of breath, wheezing, or difficulty breathing  · Coughing up blood  · Inability to swallow due to throat pain  Date Last Reviewed: 9/13/2015 © 2000-2017 [employer]. [address] [place] [address], [place] [address]. All rights reserved. This information is not intended as a substitute for professional medical care. Always follow your healthcare professional's instructions.

## 2019-12-03 NOTE — PROGRESS NOTES
"Subjective:       Patient ID: Fco Zafar III is a 34 y.o. male.    Vitals:  height is 5' 7" (1.702 m) and weight is 106.6 kg (235 lb). His temperature is 98.5 °F (36.9 °C). His blood pressure is 124/77 and his pulse is 89. His respiration is 18 and oxygen saturation is 97%.     Chief Complaint: Cough    Patient presents with dry cough, head nasal congestion and sore throat that started on Sunday. 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 The current episode started yesterday. The problem has been gradually worsening. Neither side of throat is experiencing more pain than the other. There has been no fever. Associated symptoms include congestion, coughing, swollen glands and trouble swallowing. Pertinent negatives include no ear pain, shortness of breath, stridor or vomiting. He has had exposure to strep. He has tried NSAIDs and acetaminophen for the symptoms. The treatment provided no relief.       Constitution: Negative for chills, sweating, fatigue and fever.   HENT: Positive for congestion, sinus pain, sinus pressure, sore throat and trouble swallowing. Negative for ear pain and voice change.    Neck: Positive for painful lymph nodes.   Eyes: Negative for eye redness.   Respiratory: Positive for cough. Negative for chest tightness, sputum production, bloody sputum, COPD, shortness of breath, stridor, wheezing and asthma.    Gastrointestinal: Negative for nausea and vomiting.   Musculoskeletal: Negative for muscle ache.   Skin: Negative for rash.   Allergic/Immunologic: Negative for seasonal allergies and asthma.   Hematologic/Lymphatic: Positive for swollen lymph nodes.       Objective:      Physical Exam   Constitutional: He is oriented to person, place, and time. He appears well-developed and well-nourished. He is cooperative.  Non-toxic appearance. He does not have a sickly appearance. He does not appear ill. No distress.   HENT:   Head: Normocephalic and atraumatic. "   Right Ear: Hearing, tympanic membrane, external ear and ear canal normal.   Left Ear: Hearing, tympanic membrane, external ear and ear canal normal.   Nose: Mucosal edema and rhinorrhea present. No purulent discharge or nasal deformity. No epistaxis. Right sinus exhibits no maxillary sinus tenderness and no frontal sinus tenderness. Left sinus exhibits no maxillary sinus tenderness and no frontal sinus tenderness.   Mouth/Throat: Uvula is midline and mucous membranes are normal. No trismus in the jaw. Normal dentition. No uvula swelling. Posterior oropharyngeal erythema present. No oropharyngeal exudate or posterior oropharyngeal edema.   Oropharynx with copious thick, clear mucus      Eyes: Lids are normal. Right conjunctiva is injected. Left conjunctiva is injected. No scleral icterus.   Neck: Trachea normal, full passive range of motion without pain and phonation normal. Neck supple. No neck rigidity. No edema and no erythema present.   Cardiovascular: Normal rate, regular rhythm, normal heart sounds, intact distal pulses and normal pulses.   Pulmonary/Chest: Effort normal and breath sounds normal. No stridor. No respiratory distress. He has no decreased breath sounds. He has no wheezes. He has no rhonchi. He has no rales.   Abdominal: Normal appearance.   Musculoskeletal: Normal range of motion. He exhibits no edema or deformity.   Neurological: He is alert and oriented to person, place, and time. He exhibits normal muscle tone. Coordination normal.   Skin: Skin is warm, dry, intact, not diaphoretic, not pale and no rash.   Psychiatric: He has a normal mood and affect. His speech is normal and behavior is normal. Judgment and thought content normal. Cognition and memory are normal.   Nursing note and vitals reviewed.          Results for orders placed or performed in visit on 12/03/19   POCT rapid strep A   Result Value Ref Range    Rapid Strep A Screen Negative Negative     Acceptable Yes     POCT Influenza A/B   Result Value Ref Range    Rapid Influenza A Ag Negative Negative    Rapid Influenza B Ag Negative Negative     Acceptable Yes        Assessment:       1. Upper respiratory infection with cough and congestion    2. Sore throat        Plan:         Upper respiratory infection with cough and congestion    Sore throat  -     POCT rapid strep A  -     POCT Influenza A/B    Other orders  -     promethazine-dextromethorphan (PROMETHAZINE-DM) 6.25-15 mg/5 mL Syrp; Take 5 mLs by mouth 3 (three) times daily as needed (cough/congestion). Will cause drowsiness  Dispense: 80 mL; Refill: 0

## 2020-01-20 ENCOUNTER — PATIENT MESSAGE (OUTPATIENT)
Dept: DIABETES | Facility: CLINIC | Age: 35
End: 2020-01-20

## 2020-01-23 ENCOUNTER — TELEPHONE (OUTPATIENT)
Dept: PRIMARY CARE CLINIC | Facility: CLINIC | Age: 35
End: 2020-01-23

## 2020-01-23 NOTE — TELEPHONE ENCOUNTER
----- Message from Felicia Whyte MA sent at 1/22/2020  5:03 PM CST -----  Can you schedule him for 1/28/2020 2p  ----- Message -----  From: Felicia Whyte MA  Sent: 1/22/2020   7:54 AM CST  To: Felicia Whtye MA        ----- Message -----  From: Leona Parmar RD, CDE  Sent: 1/21/2020   8:49 AM CST  To: [facility] Staff    Can you please get him scheduled with you ASAP??  I saw him over a year ago and his A1c was terrible and only taking metformin.   He recently sent me a message asking for refill but I told him he has to come in to see someone, preferably you!  Thanks!

## 2020-01-28 ENCOUNTER — OFFICE VISIT (OUTPATIENT)
Dept: INTERNAL MEDICINE | Facility: CLINIC | Age: 35
End: 2020-01-28
Payer: COMMERCIAL

## 2020-01-28 VITALS
HEIGHT: 67 IN | DIASTOLIC BLOOD PRESSURE: 70 MMHG | BODY MASS INDEX: 36.88 KG/M2 | WEIGHT: 235 LBS | SYSTOLIC BLOOD PRESSURE: 132 MMHG

## 2020-01-28 DIAGNOSIS — E78.5 HYPERLIPIDEMIA, UNSPECIFIED HYPERLIPIDEMIA TYPE: ICD-10-CM

## 2020-01-28 DIAGNOSIS — I10 ESSENTIAL HYPERTENSION: ICD-10-CM

## 2020-01-28 DIAGNOSIS — Z79.4 TYPE 2 DIABETES MELLITUS WITH COMPLICATION, WITH LONG-TERM CURRENT USE OF INSULIN: ICD-10-CM

## 2020-01-28 DIAGNOSIS — R80.9 TYPE 1 DIABETES MELLITUS WITH MICROALBUMINURIA: ICD-10-CM

## 2020-01-28 DIAGNOSIS — E10.69 DIABETIC ERECTILE DYSFUNCTION ASSOCIATED WITH TYPE 1 DIABETES MELLITUS: ICD-10-CM

## 2020-01-28 DIAGNOSIS — E11.8 TYPE 2 DIABETES MELLITUS WITH COMPLICATION, WITH LONG-TERM CURRENT USE OF INSULIN: ICD-10-CM

## 2020-01-28 DIAGNOSIS — E10.29 TYPE 1 DIABETES MELLITUS WITH MICROALBUMINURIA: ICD-10-CM

## 2020-01-28 DIAGNOSIS — E13.9 LADA (LATENT AUTOIMMUNE DIABETES IN ADULTS), MANAGED AS TYPE 1: ICD-10-CM

## 2020-01-28 DIAGNOSIS — Z79.4 TYPE 2 DIABETES MELLITUS WITH HYPERGLYCEMIA, WITH LONG-TERM CURRENT USE OF INSULIN: Primary | ICD-10-CM

## 2020-01-28 DIAGNOSIS — E11.65 TYPE 2 DIABETES MELLITUS WITH HYPERGLYCEMIA, WITH LONG-TERM CURRENT USE OF INSULIN: Primary | ICD-10-CM

## 2020-01-28 DIAGNOSIS — N52.1 DIABETIC ERECTILE DYSFUNCTION ASSOCIATED WITH TYPE 1 DIABETES MELLITUS: ICD-10-CM

## 2020-01-28 PROCEDURE — 3078F PR MOST RECENT DIASTOLIC BLOOD PRESSURE < 80 MM HG: ICD-10-PCS | Mod: CPTII,S$GLB,, | Performed by: NURSE PRACTITIONER

## 2020-01-28 PROCEDURE — 3008F PR BODY MASS INDEX (BMI) DOCUMENTED: ICD-10-PCS | Mod: CPTII,S$GLB,, | Performed by: NURSE PRACTITIONER

## 2020-01-28 PROCEDURE — 3078F DIAST BP <80 MM HG: CPT | Mod: CPTII,S$GLB,, | Performed by: NURSE PRACTITIONER

## 2020-01-28 PROCEDURE — 99214 PR OFFICE/OUTPT VISIT, EST, LEVL IV, 30-39 MIN: ICD-10-PCS | Mod: S$GLB,,, | Performed by: NURSE PRACTITIONER

## 2020-01-28 PROCEDURE — 99999 PR PBB SHADOW E&M-EST. PATIENT-LVL IV: ICD-10-PCS | Mod: PBBFAC,,, | Performed by: NURSE PRACTITIONER

## 2020-01-28 PROCEDURE — 99999 PR PBB SHADOW E&M-EST. PATIENT-LVL IV: CPT | Mod: PBBFAC,,, | Performed by: NURSE PRACTITIONER

## 2020-01-28 PROCEDURE — 3008F BODY MASS INDEX DOCD: CPT | Mod: CPTII,S$GLB,, | Performed by: NURSE PRACTITIONER

## 2020-01-28 PROCEDURE — 99214 OFFICE O/P EST MOD 30 MIN: CPT | Mod: S$GLB,,, | Performed by: NURSE PRACTITIONER

## 2020-01-28 PROCEDURE — 3075F PR MOST RECENT SYSTOLIC BLOOD PRESS GE 130-139MM HG: ICD-10-PCS | Mod: CPTII,S$GLB,, | Performed by: NURSE PRACTITIONER

## 2020-01-28 PROCEDURE — 3075F SYST BP GE 130 - 139MM HG: CPT | Mod: CPTII,S$GLB,, | Performed by: NURSE PRACTITIONER

## 2020-01-28 RX ORDER — INSULIN GLARGINE 100 [IU]/ML
28 INJECTION, SOLUTION SUBCUTANEOUS 2 TIMES DAILY
Qty: 15 ML | Refills: 5
Start: 2020-01-28 | End: 2020-03-24 | Stop reason: SDUPTHER

## 2020-01-28 NOTE — PATIENT INSTRUCTIONS
Snacks can be an important part of a balanced, healthy meal plan. They allow you to eat more frequently, feeling full and satisfied throughout the day. Also, they allow you to spread carbohydrates evenly, which may stabilize blood sugars.  Plus, snacks are enjoyable!     The amount of carbohydrate needed at snacks varies. Generally, about 15-30 grams of carbohydrate per snack is recommended.  Below you will find some tasty treats.       0-5 gm carb   Crystal Light   Vitamin Water Zero   Herbal tea, unsweetened   2 tsp peanut butter on celery   1./2 cup sugar-free jell-o   1 sugar-free popsicle   ¼ cup blueberries   8oz Blue Neisha unsweetened almond milk   5 baby carrots & celery sticks, cucumbers, bell peppers dipped in ¼ cup salsa, 2Tbsp light ranch dressing or 2Tbsp plain Greek yogurt   10 Goldfish crackers   ½ oz low-fat cheese or string cheese   1 closed handful of nuts, unsalted   1 Tbsp of sunflower seeds, unsalted   1 cup Smart Pop popcorn   1 whole grain brown rice cake        15 gm carb   1 small piece of fruit or ½ banana or 1/2 cup lite canned fruit   3 linsey cracker squares   3 cups Smart Pop popcorn, top spray butter, Grimes lite salt or cinnamon and Truvia   5 Vanilla Wafers   ½ cup low fat, no added sugar ice cream or frozen yogurt (Blue bell, Blue Bunny, Weight Watchers, Skinny Cow)   ½ turkey, ham, or chicken sandwich   ½ c fruit with ½ c Cottage cheese   4-6 unsalted wheat crackers with 1 oz low fat cheese or 1 tbsp peanut butter    30-45 goldfish crackers (depending on flavor)    7-8 Buddhist mini brown rice cakes (caramel, apple cinnamon, chocolate)    12 Buddhist mini brown rice cakes (cheddar, bbq, ranch)    1/3 cup hummus dip with raw veg   1/2 whole wheat martin, 1Tbsp hummus   Mini Pizza (1/2 whole wheat English muffin, low-fat  cheese, tomato sauce)   100 calorie snack pack (Oreo, Chips Ahoy, Ritz Mix, Baked Cheetos)   4-6 oz. light or Greek Style yogurt  (Chojuliai, Yoplait, Okios, [facility])   ½ cup sugar-free pudding     6 in. wheat tortilla or martin oven toasted chips (topped with spray butter flavoring, cinnamon, Truvia OR spray butter, garlic powder, chili powder)    18 BBQ Popchips (available at [employer], [employer], Fresh Market)                   Diabetes Support Group Meetings         Date: Topic:   February 13 Eat Fit GONZALES/Health Promotion   March 12 Taking Care of Your Smile   April 9 Spring into Healthy Eating/Cooking Demo   May 14 Ease Your Mind with Diabetes   Hallie 11 Summer Treats/Cooking Demo   July 9 Eat Fit GONZALES/Grocery Tour   August 13 Taking Care of Your Eyes and Feet   Sept 10 Chair Exercises/ADA updates   October 8 Recipes & Treats/Cooking Demo   November 12 Heart Health/Pump it up!   December 10 Year-End Close Out Party!        Meetings are held in the [facility] (A) of the [facility] located at [address] [place] [address]. Please call [phone] for additional information.    Free service, offered every 2nd Thursday of every month! Family members and/or friends are welcome as well!  Support group is for patients with type 1 or type 2 diabetes.    From 3:30p to 4:30p        Empagliflozin oral tablets  What is this medicine?  EMPAGLIGLOZIN (EM pa gli FLOE zin) helps to treat type 2 diabetes. It helps to control blood sugar. Treatment is combined with diet and exercise.  How should I use this medicine?  Take this medicine by mouth with a glass of water. Follow the directions on the prescription label. Take it in the morning, with or without food. Take your dose at the same time each day. Do not take more often than directed. Do not stop taking except on your doctor's advice.  Talk to your pediatrician regarding the use of this medicine in children. Special care may be needed.  What side effects may I notice from receiving this medicine?  Side effects that you should report to your doctor  or health care professional as soon as possible:  · allergic reactions like skin rash, itching or hives, swelling of the face, lips, or tongue  · breathing problems  · dizziness  · fast or irregular heartbeat  · feeling faint or lightheaded, falls  · muscle weakness  · nausea, vomiting, unusual stomach upset or pain  · signs and symptoms of low blood sugar such as feeling anxious, confusion, dizziness, increased hunger, unusually weak or tired, sweating, shakiness, cold, irritable, headache, blurred vision, fast heartbeat, loss of consciousness  · signs and symptoms of a urinary tract infection, such as fever, chills, a burning feeling when urinating, blood in the urine, back pain  · trouble passing urine or change in the amount of urine, including an urgent need to urinate more often, in larger amounts, or at night  · penile discharge, itching, or pain in men  · unusual tiredness  · vaginal discharge, itching, or odor in women  Side effects that usually do not require medical attention (Report these to your doctor or health care professional if they continue or are bothersome.):  · joint pain  · mild increase in urination  · thirsty  What may interact with this medicine?  Do not take this medicine with any of the following medications:  · gatifloxacin  This medicine may also interact with the following medications:  · alcohol  · certain medicines for blood pressure, heart disease  · diuretics  What if I miss a dose?  If you miss a dose, take it as soon as you can. If it is almost time for your next dose, take only that dose. Do not take double or extra doses.  Where should I keep my medicine?  Keep out of the reach of children.  Store at room temperature between 20 and 25 degrees C (68 and 77 degrees F). Throw away any unused medicine after the expiration date.  What should I tell my health care provider before I take this medicine?  They need to know if you have any of these conditions:  · dehydration  · diabetic  ketoacidosis  · diet low in salt  · eating less due to illness, surgery, dieting, or any other reason  · having surgery  · high cholesterol  · high levels of potassium in the blood  · history of pancreatitis or pancreas problems  · history of yeast infection of the penis or vagina  · if you often drink alcohol  · infections in the bladder, kidneys, or urinary tract  · kidney disease  · liver disease  · low blood pressure  · on hemodialysis  · problems urinating  · type 1 diabetes  · uncircumcised male  · an unusual or allergic reaction to empagliflozin, other medicines, foods, dyes, or preservatives  · pregnant or trying to get pregnant  · breast-feeding  What should I watch for while using this medicine?  Visit your doctor or health care professional for regular checks on your progress.  This medicine can cause a serious condition in which there is too much acid in the blood. If you develop nausea, vomiting, stomach pain, unusual tiredness, or breathing problems, stop taking this medicine and call your doctor right away. If possible, use a ketone dipstick to check for ketones in your urine.  A test called the HbA1C (A1C) will be monitored. This is a simple blood test. It measures your blood sugar control over the last 2 to 3 months. You will receive this test every 3 to 6 months.  Learn how to check your blood sugar. Learn the symptoms of low and high blood sugar and how to manage them.  Always carry a quick-source of sugar with you in case you have symptoms of low blood sugar. Examples include hard sugar candy or glucose tablets. Make sure others know that you can choke if you eat or drink when you develop serious symptoms of low blood sugar, such as seizures or unconsciousness. They must get medical help at once.  Tell your doctor or health care professional if you have high blood sugar. You might need to change the dose of your medicine. If you are sick or exercising more than usual, you might need to change the  dose of your medicine.  Do not skip meals. Ask your doctor or health care professional if you should avoid alcohol. Many nonprescription cough and cold products contain sugar or alcohol. These can affect blood sugar.  Wear a medical ID bracelet or chain, and carry a card that describes your disease and details of your medicine and dosage times.  NOTE:This sheet is a summary. It may not cover all possible information. If you have questions about this medicine, talk to your doctor, pharmacist, or health care provider. Copyright© 2017 [employer]        Dulaglutide injection  What is this medicine?  DULAGLUTIDE (DOO la GLOO tide) is used to improve blood sugar control in adults with type 2 diabetes. This medicine may be used with other oral diabetes medicines.  How should I use this medicine?  This medicine is for injection under the skin of your upper leg (thigh), stomach area, or upper arm. It is usually given once every week (every 7 days). You will be taught how to prepare and give this medicine. Use exactly as directed. Take your medicine at regular intervals. Do not take it more often than directed.  If you use this medicine with insulin, you should inject this medicine and the insulin separately. Do not mix them together. Do not give the injections right next to each other. Change (rotate) injection sites with each injection.  It is important that you put your used needles and syringes in a special sharps container. Do not put them in a trash can. If you do not have a sharps container, call your pharmacist or healthcare provider to get one.  A special MedGuide will be given to you by the pharmacist with each prescription and refill. Be sure to read this information carefully each time.  Talk to your pediatrician regarding the use of this medicine in children. Special care may be needed.  What side effects may I notice from receiving this medicine?  Side effects that you should report to your doctor or health  care professional as soon as possible:  · allergic reactions like skin rash, itching or hives, swelling of the face, lips, or tongue  · breathing problems  · signs and symptoms of low blood sugar such as feeling anxious, confusion, dizziness, increased hunger, unusually weak or tired, sweating, shakiness, cold, irritable, headache, blurred vision, fast heartbeat, loss of consciousness  · unusual stomach upset or pain  · vomiting  Side effects that usually do not require medical attention (Report these to your doctor or health care professional if they continue or are bothersome.):diarrhea  · heartburn  · loss of appetite  · nausea  · pain, redness, or irritation at site where injected  What may interact with this medicine?  Do not take this medicine with any of the following medications:  · gatifloxacin  Many medications may cause changes in blood sugar, these include:  · alcohol containing beverages  · aspirin and aspirin-like drugs  · chloramphenicol  · chromium  · diuretics  · female hormones, such as estrogens or progestins, birth control pills  · heart medicines  · isoniazid  · male hormones or anabolic steroids  · medications for weight loss  · medicines for allergies, asthma, cold, or cough  · medicines for mental problems  · medicines called MAO inhibitors - Nardil, Parnate, Marplan, Eldepryl  · niacin  · NSAIDS, such as ibuprofen  · pentamidine  · phenytoin  · probenecid  · quinolone antibiotics such as ciprofloxacin, levofloxacin, ofloxacin  · some herbal dietary supplements  · steroid medicines such as prednisone or cortisone  · thyroid hormonesSome medications can hide the warning symptoms of low blood sugar (hypoglycemia). You may need to monitor your blood sugar more closely if you are taking one of these medications. These include:  · beta-blockers, often used for high blood pressure or heart problems (examples include atenolol, metoprolol, propranolol)  · clonidine  · guanethidine  · reserpine  What  if I miss a dose?  If you miss a dose, take it as soon as you can within 3 days after the missed dose. Then take your next dose at your regular weekly time. If it has been longer than 3 days after the missed dose, do not take the missed dose. Take the next dose at your regular time. Do not take double or extra doses. If you have questions about a missed dose, contact your health care provider for advice.  Where should I keep my medicine?  Keep out of the reach of children.  Store this medicine in a refrigerator between 2 and 8 degrees C (36 and 46 degrees F). Do not freeze or use if the medicine has been frozen. Protect from light and excessive heat. Each single-dose pen or prefilled syringe can be kept at room temperature, not to exceed 30 degrees C (86 degrees F) for a total of 14 days, if needed. Store in the carton until use. Throw away any unused medicine after the expiration date.  What should I tell my health care provider before I take this medicine?  They need to know if you have any of these conditions:  · endocrine tumors (MEN 2) or if someone in your family had these tumors  · history of pancreatitis  · kidney disease  · liver disease  · stomach problems  · thyroid cancer or if someone in your family had thyroid cancer  · an unusual or allergic reaction to dulaglutide, other medicines, foods, dyes, or preservatives  · pregnant or trying to get pregnant  · breast-feeding  What should I watch for while using this medicine?  Visit your doctor or health care professional for regular checks on your progress.  A test called the HbA1C (A1C) will be monitored. This is a simple blood test. It measures your blood sugar control over the last 2 to 3 months. You will receive this test every 3 to 6 months.  Learn how to check your blood sugar. Learn the symptoms of low and high blood sugar and how to manage them.  Always carry a quick-source of sugar with you in case you have symptoms of low blood sugar. Examples  include hard sugar candy or glucose tablets. Make sure others know that you can choke if you eat or drink when you develop serious symptoms of low blood sugar, such as seizures or unconsciousness. They must get medical help at once.  Tell your doctor or health care professional if you have high blood sugar. You might need to change the dose of your medicine. If you are sick or exercising more than usual, you might need to change the dose of your medicine.  Do not skip meals. Ask your doctor or health care professional if you should avoid alcohol. Many nonprescription cough and cold products contain sugar or alcohol. These can affect blood sugar.  Wear a medical ID bracelet or chain, and carry a card that describes your disease and details of your medicine and dosage times.  NOTE:This sheet is a summary. It may not cover all possible information. If you have questions about this medicine, talk to your doctor, pharmacist, or health care provider. Copyright© 2017 [employer]        Hypoglycemia (Low Blood Sugar)     Fast-acting sugar includes a cup of nonfat milk.     Too little sugar (glucose) in your blood is called hypoglycemia or low blood sugar. Low blood sugar usually means anything lower than 70 mg/dL. Talk with your healthcare provider about your target range and what level is too low for you. Diabetes itself doesnt cause low blood sugar. But some of the treatments for diabetes, such as pills or insulin, may raise your risk for it. Low blood sugar may cause you to pass out or have a seizure. So always treat low blood sugar right away, but don't overeat.  Special note: Always carry a source of fast-acting sugar and a snack in case of hypoglycemia.   What you may notice  If you have low blood sugar, you may have one or more of these symptoms:  · Shakiness or dizziness  · Cold, clammy skin or sweating  · Feelings of hunger  · Headache  · Nervousness  · A hard, fast heartbeat  · Weakness  · Confusion or  irritability  · Blurred vision  · Having nightmares or waking up confused or sweating  · Numbness or tingling in the lips or tongue  What you should do  Here are tips to follow if you have hypoglycemia:   · First check your blood sugar. If it is too low (out of your target range), eat or drink 15 to 20 grams of fast-acting sugar. This may be 3 to 4 glucose tablets, 4 ounces (half a cup) of fruit juice or regular (nondiet) soda, 8 ounces (1 cup) of fat-free milk, or 1 tablespoon of honey. Dont take more than this, or your blood sugar may go too high.  · Wait 15 minutes. Then recheck your blood sugar if you can.  · If your blood sugar is still too low, repeat the steps above and check your blood sugar again. If your blood sugar still has not returned to your target range, contact your healthcare provider or seek emergency care.  · Once your blood sugar returns to target range, eat a snack or meal.  Preventing low blood sugar  Things you can do include the following:   · If your condition needs a strict treatment plan, eat your meals and snacks at the same times each day. Dont skip meals!  · If your treatment plan lets you change when you eat and what you eat, learn how to change the time and dose of your rapid-acting insulin to match this.   · Ask your healthcare provider if it is safe for you to drink alcohol. Never drink on an empty stomach.  · Take your medicine at the prescribed times.  · Always carry a source of fast-acting sugar and a snack when youre away from home.  Other things to do  Additional tips include the following:  · Carry a medical ID card, a compact USB drive, or wear a medical alert bracelet or necklace. It should say that you have diabetes. It should also say what to do if you pass out or have a seizure.  · Make sure your family, friends, and coworkers know the signs of low blood sugar. Tell them what to do if your blood sugar falls very low and you cant treat yourself.  · Keep a glucagon  emergency kit handy. Be sure your family, friends, and coworkers know how and when to use it. Check it regularly and replace the glucagon before it expires.  · Talk with your health care team about other things you can do to prevent low blood sugar.     If you have unexplained hypoglycemia or hypoglycemia several times, call your healthcare provider.   Date Last Reviewed: 5/1/2016  © 6695-0158 [employer]. [address] [place] [address], [place] [address]. All rights reserved. This information is not intended as a substitute for professional medical care. Always follow your healthcare professional's instructions.

## 2020-01-28 NOTE — PROGRESS NOTES
CHIEF COMPLAINT: Type 2 Diabetes     HPI: Mr. Fco Zafar III is a 34 y.o. male who was diagnosed with Type 2 DM in 2012,  mg/dl (initial)  Started insulin in 2012.  Reviewed family history  +familial history of dm : mother   Has h/o ED, HTN, HLD.   Needs to establish pcp.   Has issues w/ diet-eating out, high carbs at times- Rallys, etc.   BG today 305 mg/dl    PREVIOUS DIABETES MEDICATIONS TRIED  Metformin   lantus     CURRENT DIABETIC MEDS: lantus 25 units bid , metformin 1000 mg bid -usually once a day   Misses here and there  + c peptide   Last meal at 1215p     On lantus bid, will start omnipod dash-pump therapy  Testing max 4  x a day  Patient is willing and able to use the device  Demonstrated an understanding of the technology and is motivated to use CGM  Patient expected to adhere to a comprehensive diabetes treatment plan and patient has adequate medical supervision    Diabetes Management Status    Statin: Taking  ACE/ARB: Taking    Screening or Prevention Patient's value Goal Complete/Controlled?   HgA1C Testing and Control   Lab Results   Component Value Date    HGBA1C 11.0 (H) 04/28/2018      Annually/Less than 8% No   Lipid profile : 04/28/2018 Annually No   LDL control Lab Results   Component Value Date    LDLCALC 67.6 04/28/2018    Annually/Less than 100 mg/dl  No   Nephropathy screening Lab Results   Component Value Date    LABMICR 58.0 04/25/2018     Lab Results   Component Value Date    PROTEINUA Negative 04/25/2018    Annually No   Blood pressure BP Readings from Last 1 Encounters:   01/28/20 132/70    Less than 140/90 Yes   Dilated retinal exam : 05/06/2019 Annually Yes   Foot exam   : 01/28/2020 Annually No     REVIEW OF SYSTEMS  General: no weakness, fatigue, no weight changes.   Eyes: no double or blurred vision, eye pain, or redness  Cardiovascular: no chest pain, palpitations, edema, or murmurs.   Respiratory: no cough or dyspnea.   GI: no heartburn, nausea, or changes in bowel  "patterns; good appetite.   Skin: no rashes, dryness, itching, or reactions at insulin injection sites.  Neuro: no numbness, tingling, tremors, or vertigo.   Endocrine: no polyuria, polydipsia, polyphagia, heat or cold intolerance.     Vital Signs  /70   Ht 5' 7" (1.702 m)   Wt 106.6 kg (235 lb)   BMI 36.81 kg/m²     Hemoglobin A1C   Date Value Ref Range Status   04/28/2018 11.0 (H) 4.0 - 5.6 % Final     Comment:     According to ADA guidelines, hemoglobin A1c <7.0% represents  optimal control in non-pregnant diabetic patients. Different  metrics may apply to specific patient populations.   Standards of Medical Care in Diabetes-2016.  For the purpose of screening for the presence of diabetes:  <5.7%     Consistent with the absence of diabetes  5.7-6.4%  Consistent with increasing risk for diabetes   (prediabetes)  >or=6.5%  Consistent with diabetes  Currently, no consensus exists for use of hemoglobin A1c  for diagnosis of diabetes for children.  This Hemoglobin A1c assay has significant interference with fetal   hemoglobin   (HbF). The results are invalid for patients with abnormal amounts of   HbF,   including those with known Hereditary Persistence   of Fetal Hemoglobin. Heterozygous hemoglobin variants (HbAS, HbAC,   HbAD, HbAE, HbA2) do not significantly interfere with this assay;   however, presence of multiple variants in a sample may impact the %   interference.     05/20/2017 9.2 (H) 4.5 - 6.2 % Final     Comment:     According to ADA guidelines, hemoglobin A1C <7.0% represents  optimal control in non-pregnant diabetic patients.  Different  metrics may apply to specific populations.   Standards of Medical Care in Diabetes - 2016.  For the purpose of screening for the presence of diabetes:  <5.7%     Consistent with the absence of diabetes  5.7-6.4%  Consistent with increasing risk for diabetes   (prediabetes)  >or=6.5%  Consistent with diabetes  Currently no consensus exists for use of hemoglobin " A1C  for diagnosis of diabetes for children.     02/27/2017 8.9 (H) 4.5 - 6.2 % Final     Comment:     According to ADA guidelines, hemoglobin A1C <7.0% represents  optimal control in non-pregnant diabetic patients.  Different  metrics may apply to specific populations.   Standards of Medical Care in Diabetes - 2016.  For the purpose of screening for the presence of diabetes:  <5.7%     Consistent with the absence of diabetes  5.7-6.4%  Consistent with increasing risk for diabetes   (prediabetes)  >or=6.5%  Consistent with diabetes  Currently no consensus exists for use of hemoglobin A1C  for diagnosis of diabetes for children.          Chemistry        Component Value Date/Time     04/28/2018 1317    K 4.1 04/28/2018 1317     04/28/2018 1317    CO2 24 04/28/2018 1317    BUN 10 04/28/2018 1317    CREATININE 0.8 04/28/2018 1317     (H) 04/28/2018 1317        Component Value Date/Time    CALCIUM 9.1 04/28/2018 1317    ALKPHOS 132 04/28/2018 1317    AST 27 04/28/2018 1317    ALT 30 04/28/2018 1317    BILITOT 0.4 04/28/2018 1317    ESTGFRAFRICA >60.0 04/28/2018 1317    EGFRNONAA >60.0 04/28/2018 1317           Lab Results   Component Value Date    TSH 0.482 06/13/2012      Lab Results   Component Value Date    CHOL 107 (L) 04/28/2018    CHOL 127 11/12/2016    CHOL 181 12/23/2015     Lab Results   Component Value Date    HDL 31 (L) 04/28/2018    HDL 35 (L) 11/12/2016    HDL 44 12/23/2015     Lab Results   Component Value Date    LDLCALC 67.6 04/28/2018    LDLCALC 76.6 11/12/2016    LDLCALC 105.0 12/23/2015     Lab Results   Component Value Date    TRIG 42 04/28/2018    TRIG 77 11/12/2016    TRIG 160 (H) 12/23/2015     Lab Results   Component Value Date    CHOLHDL 29.0 04/28/2018    CHOLHDL 27.6 11/12/2016    CHOLHDL 24.3 12/23/2015         PHYSICAL EXAMINATION  Constitutional: Appears well, no distress. Reviewed vitals above.  Eyes: conjunctivae & lids intact; PERRLA, EOMs intact; optic discs   Neck:  Supple, trachea midline.   Respiratory: CTA without wheezes, even and unlabored, upon palpation wnl  Cardiovascular: RRR; no carotid bruits or murmurs; (+) DP pulses; no edema or varicosities  Lymph: deferred  Skin: warm and dry; no injection site reactions, no acanthosis nigracans observed.  Neuro:patient alert and cooperative, normal affect; steady gait.  Psychiatric: judgement & insight intact, orientation of time, place & person intact, memory; mood & affect wnl     Diabetes Foot Exam:   Protective Sensation (w/ 10 gram monofilament):  Right: Intact 6/6  Left: Intact 6/6    Visual Inspection:  Normal -  Bilateral, Callus -  Bilateral and Dry Skin -  Bilateral    Pedal Pulses:   Right: Present  Left: Present    Posterior tibialis:   Right:Present  Left: Present    Assessment/Plan    1. Type 1 diabetes mellitus with hyperglycemia, with long-term current use of insulin  Hemoglobin A1c    Comprehensive metabolic panel    TSH    Ambulatory consult to Internal Medicine    Microalbumin/creatinine urine ratio    Hemoglobin A1c    Ambulatory Referral to Diabetes Education    C-PEPTIDE    insulin (LANTUS SOLOSTAR U-100 INSULIN) glargine 100 units/mL (3mL) SubQ pen   2. Essential hypertension     3. Hyperlipidemia, unspecified hyperlipidemia type  Lipid panel   4. Diabetic erectile dysfunction associated with type 1 diabetes mellitus     6. 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 Type 1 diabetes mellitus with microalbuminuria       1. a1c goal less than 7%  Counseling >30 mins  Hold off jardiance until labs   c peptide 0.94 in 2018, now 0.55  Start trulicity 0.75 mg weekly   Increase lantus to 28 units bid  Discussed vgo 30, 4 clicks with meals and omnipod dash    With start can do dexcom g6 -will meet dexcom/blue cross/shield requirements   Continue metformin 1000 mg bid - needs twice a day -stress importance  Glipizide - not best r/t c peptide   Pt is really interested in dexcom     2.  Controlled, continue med(s)  3.   Lab Results   Component Value Date    LDLCALC 67.6 04/28/2018     At goal   Needs saturday  4. viagra prn in the past  Needs to establish pcp   5-7. see above     Addendum: r/t labs in 2/2020    FOLLOW UP  Follow up in about 3 months (around 4/28/2020).

## 2020-02-01 ENCOUNTER — LAB VISIT (OUTPATIENT)
Dept: LAB | Facility: HOSPITAL | Age: 35
End: 2020-02-01
Attending: NURSE PRACTITIONER
Payer: COMMERCIAL

## 2020-02-01 DIAGNOSIS — E11.65 TYPE 2 DIABETES MELLITUS WITH HYPERGLYCEMIA, WITH LONG-TERM CURRENT USE OF INSULIN: ICD-10-CM

## 2020-02-01 DIAGNOSIS — E78.5 HYPERLIPIDEMIA, UNSPECIFIED HYPERLIPIDEMIA TYPE: ICD-10-CM

## 2020-02-01 DIAGNOSIS — Z79.4 TYPE 2 DIABETES MELLITUS WITH HYPERGLYCEMIA, WITH LONG-TERM CURRENT USE OF INSULIN: ICD-10-CM

## 2020-02-01 LAB
ALBUMIN SERPL BCP-MCNC: 4.1 G/DL (ref 3.5–5.2)
ALP SERPL-CCNC: 117 U/L (ref 55–135)
ALT SERPL W/O P-5'-P-CCNC: 14 U/L (ref 10–44)
ANION GAP SERPL CALC-SCNC: 10 MMOL/L (ref 8–16)
AST SERPL-CCNC: 17 U/L (ref 10–40)
BILIRUB SERPL-MCNC: 0.5 MG/DL (ref 0.1–1)
BUN SERPL-MCNC: 7 MG/DL (ref 6–20)
CALCIUM SERPL-MCNC: 9.6 MG/DL (ref 8.7–10.5)
CHLORIDE SERPL-SCNC: 103 MMOL/L (ref 95–110)
CHOLEST SERPL-MCNC: 149 MG/DL (ref 120–199)
CHOLEST/HDLC SERPL: 3.7 {RATIO} (ref 2–5)
CO2 SERPL-SCNC: 25 MMOL/L (ref 23–29)
CREAT SERPL-MCNC: 0.9 MG/DL (ref 0.5–1.4)
EST. GFR  (AFRICAN AMERICAN): >60 ML/MIN/1.73 M^2
EST. GFR  (NON AFRICAN AMERICAN): >60 ML/MIN/1.73 M^2
ESTIMATED AVG GLUCOSE: ABNORMAL MG/DL (ref 68–131)
GLUCOSE SERPL-MCNC: 157 MG/DL (ref 70–110)
HBA1C MFR BLD HPLC: >14 % (ref 4–5.6)
HDLC SERPL-MCNC: 40 MG/DL (ref 40–75)
HDLC SERPL: 26.8 % (ref 20–50)
LDLC SERPL CALC-MCNC: 91 MG/DL (ref 63–159)
NONHDLC SERPL-MCNC: 109 MG/DL
POTASSIUM SERPL-SCNC: 3.8 MMOL/L (ref 3.5–5.1)
PROT SERPL-MCNC: 7.3 G/DL (ref 6–8.4)
SODIUM SERPL-SCNC: 138 MMOL/L (ref 136–145)
TRIGL SERPL-MCNC: 90 MG/DL (ref 30–150)
TSH SERPL DL<=0.005 MIU/L-ACNC: 0.58 UIU/ML (ref 0.4–4)

## 2020-02-01 PROCEDURE — 83036 HEMOGLOBIN GLYCOSYLATED A1C: CPT

## 2020-02-01 PROCEDURE — 84443 ASSAY THYROID STIM HORMONE: CPT

## 2020-02-01 PROCEDURE — 80053 COMPREHEN METABOLIC PANEL: CPT

## 2020-02-01 PROCEDURE — 80061 LIPID PANEL: CPT

## 2020-02-01 PROCEDURE — 36415 COLL VENOUS BLD VENIPUNCTURE: CPT

## 2020-02-01 PROCEDURE — 84681 ASSAY OF C-PEPTIDE: CPT

## 2020-02-03 ENCOUNTER — TELEPHONE (OUTPATIENT)
Dept: INTERNAL MEDICINE | Facility: CLINIC | Age: 35
End: 2020-02-03

## 2020-02-03 DIAGNOSIS — E11.65 TYPE 2 DIABETES MELLITUS WITH HYPERGLYCEMIA, WITH LONG-TERM CURRENT USE OF INSULIN: Primary | ICD-10-CM

## 2020-02-03 DIAGNOSIS — Z79.4 TYPE 2 DIABETES MELLITUS WITH HYPERGLYCEMIA, WITH LONG-TERM CURRENT USE OF INSULIN: Primary | ICD-10-CM

## 2020-02-03 LAB — C PEPTIDE SERPL-MCNC: 0.55 NG/ML (ref 0.78–5.19)

## 2020-02-06 ENCOUNTER — OFFICE VISIT (OUTPATIENT)
Dept: INTERNAL MEDICINE | Facility: CLINIC | Age: 35
End: 2020-02-06
Payer: COMMERCIAL

## 2020-02-06 VITALS
WEIGHT: 237.19 LBS | SYSTOLIC BLOOD PRESSURE: 132 MMHG | OXYGEN SATURATION: 97 % | HEART RATE: 80 BPM | BODY MASS INDEX: 37.23 KG/M2 | DIASTOLIC BLOOD PRESSURE: 74 MMHG | HEIGHT: 67 IN

## 2020-02-06 DIAGNOSIS — E11.65 TYPE 2 DIABETES MELLITUS WITH HYPERGLYCEMIA, WITH LONG-TERM CURRENT USE OF INSULIN: ICD-10-CM

## 2020-02-06 DIAGNOSIS — Z79.4 TYPE 2 DIABETES MELLITUS WITH COMPLICATION, WITH LONG-TERM CURRENT USE OF INSULIN: ICD-10-CM

## 2020-02-06 DIAGNOSIS — Z79.4 TYPE 2 DIABETES MELLITUS WITH HYPERGLYCEMIA, WITH LONG-TERM CURRENT USE OF INSULIN: ICD-10-CM

## 2020-02-06 DIAGNOSIS — Z00.00 ANNUAL PHYSICAL EXAM: ICD-10-CM

## 2020-02-06 DIAGNOSIS — Z76.89 ENCOUNTER TO ESTABLISH CARE WITH NEW DOCTOR: Primary | ICD-10-CM

## 2020-02-06 DIAGNOSIS — E66.01 CLASS 2 SEVERE OBESITY DUE TO EXCESS CALORIES WITH SERIOUS COMORBIDITY AND BODY MASS INDEX (BMI) OF 37.0 TO 37.9 IN ADULT: ICD-10-CM

## 2020-02-06 DIAGNOSIS — E11.29 TYPE 2 DIABETES MELLITUS WITH MICROALBUMINURIA, WITH LONG-TERM CURRENT USE OF INSULIN: ICD-10-CM

## 2020-02-06 DIAGNOSIS — I10 ESSENTIAL HYPERTENSION: ICD-10-CM

## 2020-02-06 DIAGNOSIS — Z79.4 TYPE 2 DIABETES MELLITUS WITH MICROALBUMINURIA, WITH LONG-TERM CURRENT USE OF INSULIN: ICD-10-CM

## 2020-02-06 DIAGNOSIS — E78.2 MIXED HYPERLIPIDEMIA: ICD-10-CM

## 2020-02-06 DIAGNOSIS — R80.9 TYPE 2 DIABETES MELLITUS WITH MICROALBUMINURIA, WITH LONG-TERM CURRENT USE OF INSULIN: ICD-10-CM

## 2020-02-06 DIAGNOSIS — E11.8 TYPE 2 DIABETES MELLITUS WITH COMPLICATION, WITH LONG-TERM CURRENT USE OF INSULIN: ICD-10-CM

## 2020-02-06 PROBLEM — E66.812 CLASS 2 SEVERE OBESITY DUE TO EXCESS CALORIES WITH SERIOUS COMORBIDITY AND BODY MASS INDEX (BMI) OF 37.0 TO 37.9 IN ADULT: Status: ACTIVE | Noted: 2020-02-06

## 2020-02-06 PROCEDURE — 99999 PR PBB SHADOW E&M-EST. PATIENT-LVL III: CPT | Mod: PBBFAC,,, | Performed by: FAMILY MEDICINE

## 2020-02-06 PROCEDURE — 3078F PR MOST RECENT DIASTOLIC BLOOD PRESSURE < 80 MM HG: ICD-10-PCS | Mod: CPTII,S$GLB,, | Performed by: FAMILY MEDICINE

## 2020-02-06 PROCEDURE — 99395 PREV VISIT EST AGE 18-39: CPT | Mod: S$GLB,,, | Performed by: FAMILY MEDICINE

## 2020-02-06 PROCEDURE — 99999 PR PBB SHADOW E&M-EST. PATIENT-LVL III: ICD-10-PCS | Mod: PBBFAC,,, | Performed by: FAMILY MEDICINE

## 2020-02-06 PROCEDURE — 3075F SYST BP GE 130 - 139MM HG: CPT | Mod: CPTII,S$GLB,, | Performed by: FAMILY MEDICINE

## 2020-02-06 PROCEDURE — 3075F PR MOST RECENT SYSTOLIC BLOOD PRESS GE 130-139MM HG: ICD-10-PCS | Mod: CPTII,S$GLB,, | Performed by: FAMILY MEDICINE

## 2020-02-06 PROCEDURE — 3008F BODY MASS INDEX DOCD: CPT | Mod: CPTII,S$GLB,, | Performed by: FAMILY MEDICINE

## 2020-02-06 PROCEDURE — 3078F DIAST BP <80 MM HG: CPT | Mod: CPTII,S$GLB,, | Performed by: FAMILY MEDICINE

## 2020-02-06 PROCEDURE — 3008F PR BODY MASS INDEX (BMI) DOCUMENTED: ICD-10-PCS | Mod: CPTII,S$GLB,, | Performed by: FAMILY MEDICINE

## 2020-02-06 PROCEDURE — 99395 PR PREVENTIVE VISIT,EST,18-39: ICD-10-PCS | Mod: S$GLB,,, | Performed by: FAMILY MEDICINE

## 2020-02-06 RX ORDER — ATORVASTATIN CALCIUM 40 MG/1
40 TABLET, FILM COATED ORAL DAILY
Qty: 90 TABLET | Refills: 3 | Status: SHIPPED | OUTPATIENT
Start: 2020-02-06 | End: 2021-02-22 | Stop reason: SDUPTHER

## 2020-02-06 RX ORDER — METFORMIN HYDROCHLORIDE 1000 MG/1
1000 TABLET ORAL 2 TIMES DAILY WITH MEALS
Qty: 180 TABLET | Refills: 3 | Status: SHIPPED | OUTPATIENT
Start: 2020-02-06 | End: 2020-06-17 | Stop reason: SDUPTHER

## 2020-02-06 NOTE — PROGRESS NOTES
Subjective:       Patient ID: Fco Zafar III is a 34 y.o. male.    Chief Complaint: Establish Care    HPI    34yoM to est care.     Doing well today, discussed below DM regimen. Symptoms of polyuria and polydipsia have improved since lov with DARON Gutierrez    #Endo: IDDM2 (last A1c 2/2020 = >14)  - est with shyam Horn 1/2020  - current reg: Trulicity 0.75 qwk (new); Metformin 1000 bid; Lantus 28U bid  - monitoring bg at home once every few days, needs further education  - will be est with RD for further DM Education 2/2020    Review of Systems   Constitutional: Negative for fatigue and fever.   HENT: Negative for ear pain, sinus pain and sore throat.    Respiratory: Negative for cough and shortness of breath.    Cardiovascular: Negative for chest pain and palpitations.   Gastrointestinal: Negative for abdominal pain, constipation, diarrhea, nausea and vomiting.   Genitourinary: Negative for dysuria and hematuria.   Musculoskeletal: Negative for back pain.   Skin: Negative for rash.   Neurological: Negative for weakness, numbness and headaches.         Past Medical History:   Diagnosis Date    Diabetes     DKA (diabetic ketoacidoses)     Hyperlipidemia     Hypertension         Prior to Admission medications    Medication Sig Start Date End Date Taking? Authorizing Provider   atorvastatin (LIPITOR) 40 MG tablet Take 1 tablet (40 mg total) by mouth once daily. 5/13/19   Ry Lindquist MD   blood sugar diagnostic Strp 1 each. 7/22/13   Historical Provider, MD   blood-glucose meter Misc 1 each by Misc.(Non-Drug; Combo Route) route 4 (four) times daily as needed. 8/25/15 8/24/16  Ry Lindquist MD   dulaglutide (TRULICITY) 0.75 mg/0.5 mL PnIj Inject 0.5 mLs (0.75 mg total) into the skin every 7 days. Not a candidate at this time for glipizide or actos 1/28/20   JOHANA Mancuso, FNP   insulin (LANTUS SOLOSTAR U-100 INSULIN) glargine 100 units/mL (3mL) SubQ pen Inject 28 Units into the skin 2 (two)  "times daily. 1/28/20   Asmita Gutierrez, APRN, FNP   lancets Misc 1 each by Misc.(Non-Drug; Combo Route) route 4 (four) times daily as needed. 2/27/17   Ry Lindquist MD   lisinopril (PRINIVIL,ZESTRIL) 2.5 MG tablet Take 1 tablet (2.5 mg total) by mouth once daily. 5/13/19   Ry Lindquist MD   metFORMIN (GLUCOPHAGE) 1000 MG tablet TAKE 1 TABLET(1000 MG) BY MOUTH TWICE DAILY WITH MEALS 5/6/19   Ry Lindquist MD   pen needle, diabetic (BD ULTRA-FINE AMELIA PEN NEEDLE) 32 gauge x 5/32" Ndle USE WITH INSULIN PEN TWICE DAILY 8/21/19   Giovani Bergman MD   promethazine-dextromethorphan (PROMETHAZINE-DM) 6.25-15 mg/5 mL Syrp Take 5 mLs by mouth 3 (three) times daily as needed (cough/congestion). Will cause drowsiness 12/3/19   Jade Franklin, NP   sildenafil (VIAGRA) 100 MG tablet Take 1 tablet (100 mg total) by mouth daily as needed for Erectile Dysfunction. 5/13/19 5/12/20  Ry Lindquist MD        Past medical history, surgical history, and family medical history reviewed and updated as appropriate.    Medications and allergies reviewed.     Objective:          Vitals:    02/06/20 1359   BP: 132/74   BP Location: Right arm   Patient Position: Sitting   BP Method: Medium (Manual)   Pulse: 80   SpO2: 97%   Weight: 107.6 kg (237 lb 3.4 oz)   Height: 5' 7" (1.702 m)     Body mass index is 37.15 kg/m².  Physical Exam   Constitutional: He is oriented to person, place, and time. He appears well-developed and well-nourished.   Eyes: Pupils are equal, round, and reactive to light. EOM are normal.   Cardiovascular: Normal rate, regular rhythm and normal heart sounds.   No murmur heard.  Pulmonary/Chest: Effort normal and breath sounds normal. No respiratory distress. He has no wheezes.   Abdominal: Soft. Bowel sounds are normal. He exhibits no distension. There is no tenderness.   Musculoskeletal: Normal range of motion.   Neurological: He is alert and oriented to person, place, and time.   Psychiatric: He " has a normal mood and affect. His behavior is normal.       Lab Results   Component Value Date    WBC 5.4 06/14/2012    HGB 13.2 (L) 06/14/2012    HCT 39.0 (L) 06/14/2012     06/14/2012    CHOL 149 02/01/2020    TRIG 90 02/01/2020    HDL 40 02/01/2020    ALT 14 02/01/2020    AST 17 02/01/2020     02/01/2020    K 3.8 02/01/2020     02/01/2020    CREATININE 0.9 02/01/2020    BUN 7 02/01/2020    CO2 25 02/01/2020    TSH 0.579 02/01/2020    GLUF 140 (H) 04/28/2018    HGBA1C >14.0 (H) 02/01/2020       Assessment:       1. Encounter to establish care with new doctor    2. Annual physical exam    3. Mixed hyperlipidemia    4. Type 2 diabetes mellitus with complication, with long-term current use of insulin    5. Essential hypertension    6. Type 2 diabetes mellitus with hyperglycemia, with long-term current use of insulin    7. Type 2 diabetes mellitus with microalbuminuria, with long-term current use of insulin    8. Class 2 severe obesity due to excess calories with serious comorbidity and body mass index (BMI) of 37.0 to 37.9 in adult        Plan:   Fco was seen today for establish care.    Diagnoses and all orders for this visit:    Reviewed medications today and encouraged to continue with course. Doing well. Continue following with DARON Gutierrez and will be meeting with CARLA in 1m as well.     Counseled regarding benefits of healthy eating and physical activity (150 minutes of moderate-intensity aerobic activity per week) to improve overall health.      Encounter to establish care with new doctor    Annual physical exam    Mixed hyperlipidemia  -     atorvastatin (LIPITOR) 40 MG tablet; Take 1 tablet (40 mg total) by mouth once daily.    Type 2 diabetes mellitus with complication, with long-term current use of insulin  -     metFORMIN (GLUCOPHAGE) 1000 MG tablet; Take 1 tablet (1,000 mg total) by mouth 2 (two) times daily with meals.    Essential hypertension  Controlled today  Type 2 diabetes  mellitus with hyperglycemia, with long-term current use of insulin  Cont meds  Type 2 diabetes mellitus with microalbuminuria, with long-term current use of insulin  Cont ACEi  Class 2 severe obesity due to excess calories with serious comorbidity and body mass index (BMI) of 37.0 to 37.9 in adult        Health maintenance reviewed with patient.     Follow up in about 6 months (around 8/6/2020).    Nirav Lanza MD  Family Medicine  [facility]  2/6/2020

## 2020-02-21 ENCOUNTER — PATIENT OUTREACH (OUTPATIENT)
Dept: ADMINISTRATIVE | Facility: OTHER | Age: 35
End: 2020-02-21

## 2020-02-26 ENCOUNTER — TELEPHONE (OUTPATIENT)
Dept: DIABETES | Facility: CLINIC | Age: 35
End: 2020-02-26

## 2020-02-26 NOTE — TELEPHONE ENCOUNTER
When rescheduling patient, per corey NP:   Please keep me posted,   According to his recent lab work -he isn't producing insulin   Acting more like HILDA   He would benefit from vgo or insulin pump   vgo may be a great starting point- vgo 30 with 4 clicks   2 clicks with snack/light meal   Additional click if sugar >180   Thanks   IB   a1c >14%

## 2020-02-28 ENCOUNTER — PATIENT OUTREACH (OUTPATIENT)
Dept: ADMINISTRATIVE | Facility: OTHER | Age: 35
End: 2020-02-28

## 2020-03-02 ENCOUNTER — CLINICAL SUPPORT (OUTPATIENT)
Dept: DIABETES | Facility: CLINIC | Age: 35
End: 2020-03-02
Payer: COMMERCIAL

## 2020-03-02 ENCOUNTER — TELEPHONE (OUTPATIENT)
Dept: INTERNAL MEDICINE | Facility: CLINIC | Age: 35
End: 2020-03-02

## 2020-03-02 DIAGNOSIS — Z79.4 TYPE 2 DIABETES MELLITUS WITH HYPERGLYCEMIA, WITH LONG-TERM CURRENT USE OF INSULIN: ICD-10-CM

## 2020-03-02 DIAGNOSIS — E11.65 TYPE 2 DIABETES MELLITUS WITH HYPERGLYCEMIA, WITH LONG-TERM CURRENT USE OF INSULIN: ICD-10-CM

## 2020-03-02 PROBLEM — E13.9 LADA (LATENT AUTOIMMUNE DIABETES IN ADULTS), MANAGED AS TYPE 1: Status: ACTIVE | Noted: 2020-02-06

## 2020-03-02 PROCEDURE — 99999 PR PBB SHADOW E&M-EST. PATIENT-LVL II: ICD-10-PCS | Mod: PBBFAC,,, | Performed by: DIETITIAN, REGISTERED

## 2020-03-02 PROCEDURE — G0108 PR DIAB MANAGE TRN  PER INDIV: ICD-10-PCS | Mod: S$GLB,,, | Performed by: DIETITIAN, REGISTERED

## 2020-03-02 PROCEDURE — G0108 DIAB MANAGE TRN  PER INDIV: HCPCS | Mod: S$GLB,,, | Performed by: DIETITIAN, REGISTERED

## 2020-03-02 PROCEDURE — 99999 PR PBB SHADOW E&M-EST. PATIENT-LVL II: CPT | Mod: PBBFAC,,, | Performed by: DIETITIAN, REGISTERED

## 2020-03-02 NOTE — PROGRESS NOTES
Diabetes Education  Author: Valencia Adamson RD  Date: 3/2/2020    Diabetes Care Management Summary  Diabetes Education Record Assessment/Progress: Initial  Current Diabetes Risk Level: High     Last A1c:   Lab Results   Component Value Date    HGBA1C >14.0 (H) 02/01/2020     Last Visit with Diabetes Educator: : 04/25/2018  Last OPCM Referral: : Not Found   Enrolled in OPCM: No    Per NP, patient w/ HILDA - not producing insulin. Reviewed disease process with patient as well as devices for management (Vgo and Omnipod). Patient expressed interest in omnipod. Patient also not consistently monitoring blood sugars. Encouraged patient to monitor at least once daily. Additionally, reviewed cgms for better management of blood sugars. Patient states he would like to be considered for dexcom pending insurance approval. Encouraged patient to take medications appropriately and follow DM Diet (CHO Counting). Patient verbalizes understanding. To return for follow up for review of logs and adherence to current treatment plan.       Diabetes Type  Diabetes Type : Other    Diabetes History  Diabetes Diagnosis: 5-10 years  Current Treatment: Insulin, Injectable(metformin, trulicity, lantus 28u BID)  Reviewed Problem List with Patient: Yes    Health Maintenance was reviewed today with patient. Discussed with patient importance of routine eye exams, foot exams/foot care, blood work (i.e.: A1c, microalbumin, and lipid), dental visits, yearly flu vaccine, and pneumonia vaccine as indicated by PCP. Patient verbalized understanding.     Health Maintenance Topics with due status: Not Due       Topic Last Completion Date    TETANUS VACCINE 05/25/2016    Eye Exam 05/06/2019    Foot Exam 01/28/2020    Lipid Panel 02/01/2020    Hemoglobin A1c 02/01/2020     There are no preventive care reminders to display for this patient.    Nutrition  Meal Planning: snacks between meal, 3 meals per day, eats out often  What type of sweetener do you use?:  Splenda, Sweet N Low  What type of beverages do you drink?: diet soda/tea  Meal Plan 24 Hour Recall - Breakfast: Coffee w/ Sweetener & Flavored Creamer, Croissant, Waffles  Meal Plan 24 Hour Recall - Lunch: Wings, Leftovers  Meal Plan 24 Hour Recall - Dinner: Mac N Cheese, Ribs, Peas  Meal Plan 24 Hour Recall - Snack: Gummy Bears, Poptart, Milk     Monitoring   Self Monitoring : Not Monitoring  Do you use a personal continuous glucose monitor?: No    Exercise   Exercise Type: none    Current Diabetes Treatment   Current Treatment: Insulin, Injectable(metformin, trulicity, lantus 28u BID)    Social History  Preferred Learning Method: Face to Face  Primary Support: Self  Occupation:     Barriers to Change  Barriers to Change: None  Learning Challenges : None    Readiness to Learn   Readiness to Learn : Acceptance    Cultural Influences  Cultural Influences: None    Diabetes Education Assessment/Progress  Diabetes Disease Process (diabetes disease process and treatment options): Discussion, Comprehends Key Points  Nutrition (Incorporating nutritional management into one's lifestyle): Discussion, Comprehends Key Points, Demonstration, Instructed, Written Materials Provided, Individual Session, Demonstrates Understanding/Competency (verbalizes/demonstrates), Needs Review  Physical Activity (incorporating physical activity into one's lifestyle): Discussion, Comprehends Key Points, Instructed, Written Materials Provided, Demonstrates Understanding/Competency (verbalizes/demonstrates)  Medications (states correct name, dose, onset, peak, duration, side effects & timing of meds): Discussion, Comprehends Key Points, Instructed, Written Materials Provided, Demonstrates Understanding/Competency(verbalizes/demonstrates), Needs Review  Monitoring (monitoring blood glucose/other parameters & using results): Discussion, Instructed, Written Materials Provided, Individual Session, Demonstrates Understanding/Competency  (verbalizes/demonstrates), Comprehends Key Points  Acute Complications (preventing, detecting, and treating acute complications): Discussion, Comprehends Key Points  Chronic Complications (preventing, detecting, and treating chronic complications): Discussion, Comprehends Key Points  Clinical (diabetes, other pertinent medical history, and relevant comorbidities reviewed during visit): Discussion, Comprehends Key Points  Cognitive (knowledge of self-management skills, functional health literacy): Discussion, Comprehends Key Points  Psychosocial (emotional response to diabetes): Discussion, Comprehends Key Points  Diabetes Distress and Support Systems: Discussion, Comprehends Key Points  Behavioral (readiness for change, lifestyle practices, self-care behaviors): Discussion, Comprehends Key Points    Goals  Patient has selected/evaluated goals during today's session: Yes, selected  Healthy Eating: Set(Patient will limit CHO intake at meals in order to have bg wnl)  Start Date: 03/02/20         Diabetes Care Plan/Intervention  Education Plan/Intervention: Individual Follow-Up DSMT    Diabetes Meal Plan  Carbohydrate Per Meal: 30-45g  Carbohydrate Per Snack : 7-15g    Today's Self-Management Care Plan was developed with the patient's input and is based on barriers identified during today's assessment.    The long and short-term goals in the care plan were written with the patient/caregiver's input. The patient has agreed to work toward these goals to improve his overall diabetes control.      The patient received a copy of today's self-management plan and verbalized understanding of the care plan, goals, and all of today's instructions.      The patient was encouraged to communicate with his physician and care team regarding his condition(s) and treatment.  I provided the patient with my contact information today and encouraged him to contact me via phone or patient portal as needed.     Education Units of Time   Time  Spent: 60 min

## 2020-03-02 NOTE — TELEPHONE ENCOUNTER
omnipod pump settings:    Basal 1.2 u/hr  iob 3 hours  Target 120  Isf 30  ICR 7.5    dexcom g6 <80 >250 mg/dl parameters

## 2020-03-08 DIAGNOSIS — E11.65 TYPE 2 DIABETES MELLITUS WITH HYPERGLYCEMIA, WITH LONG-TERM CURRENT USE OF INSULIN: ICD-10-CM

## 2020-03-08 DIAGNOSIS — Z79.4 TYPE 2 DIABETES MELLITUS WITH HYPERGLYCEMIA, WITH LONG-TERM CURRENT USE OF INSULIN: ICD-10-CM

## 2020-03-09 RX ORDER — PEN NEEDLE, DIABETIC 30 GX3/16"
NEEDLE, DISPOSABLE MISCELLANEOUS
Qty: 200 EACH | Refills: 11 | Status: SHIPPED | OUTPATIENT
Start: 2020-03-09 | End: 2020-03-24 | Stop reason: SDUPTHER

## 2020-03-17 ENCOUNTER — PATIENT OUTREACH (OUTPATIENT)
Dept: ADMINISTRATIVE | Facility: OTHER | Age: 35
End: 2020-03-17

## 2020-03-20 ENCOUNTER — OFFICE VISIT (OUTPATIENT)
Dept: URGENT CARE | Facility: CLINIC | Age: 35
End: 2020-03-20
Payer: COMMERCIAL

## 2020-03-20 VITALS
WEIGHT: 237 LBS | HEIGHT: 67 IN | SYSTOLIC BLOOD PRESSURE: 128 MMHG | TEMPERATURE: 102 F | OXYGEN SATURATION: 97 % | HEART RATE: 112 BPM | DIASTOLIC BLOOD PRESSURE: 76 MMHG | BODY MASS INDEX: 37.2 KG/M2

## 2020-03-20 DIAGNOSIS — B34.9 ACUTE VIRAL SYNDROME: Primary | ICD-10-CM

## 2020-03-20 LAB
CTP QC/QA: YES
FLUAV AG NPH QL: NEGATIVE
FLUBV AG NPH QL: NEGATIVE

## 2020-03-20 PROCEDURE — 87804 POCT INFLUENZA A/B: ICD-10-PCS | Mod: QW,S$GLB,, | Performed by: FAMILY MEDICINE

## 2020-03-20 PROCEDURE — 99214 PR OFFICE/OUTPT VISIT, EST, LEVL IV, 30-39 MIN: ICD-10-PCS | Mod: 25,S$GLB,, | Performed by: FAMILY MEDICINE

## 2020-03-20 PROCEDURE — 87804 INFLUENZA ASSAY W/OPTIC: CPT | Mod: QW,S$GLB,, | Performed by: FAMILY MEDICINE

## 2020-03-20 PROCEDURE — 99214 OFFICE O/P EST MOD 30 MIN: CPT | Mod: 25,S$GLB,, | Performed by: FAMILY MEDICINE

## 2020-03-20 RX ORDER — LISINOPRIL 2.5 MG/1
TABLET ORAL
COMMUNITY
Start: 2020-03-05 | End: 2020-07-29

## 2020-03-20 RX ORDER — DULAGLUTIDE 0.75 MG/.5ML
INJECTION, SOLUTION SUBCUTANEOUS
COMMUNITY
Start: 2020-02-25 | End: 2020-07-29

## 2020-03-20 RX ORDER — PROMETHAZINE HYDROCHLORIDE AND DEXTROMETHORPHAN HYDROBROMIDE 6.25; 15 MG/5ML; MG/5ML
5 SYRUP ORAL NIGHTLY PRN
Qty: 118 ML | Refills: 0 | Status: SHIPPED | OUTPATIENT
Start: 2020-03-20 | End: 2020-03-30

## 2020-03-20 RX ORDER — BENZONATATE 100 MG/1
100 CAPSULE ORAL EVERY 6 HOURS PRN
Qty: 30 CAPSULE | Refills: 1 | Status: SHIPPED | OUTPATIENT
Start: 2020-03-20 | End: 2020-07-29

## 2020-03-20 RX ORDER — METFORMIN HYDROCHLORIDE 1000 MG/1
TABLET ORAL
COMMUNITY
Start: 2020-03-05 | End: 2020-07-29

## 2020-03-20 NOTE — PATIENT INSTRUCTIONS
"  Viral Syndrome (Adult)  A viral illness may cause a number of symptoms. The symptoms depend on the part of the body that the virus affects. If it settles in your nose, throat, and lungs, it may cause cough, sore throat, congestion, and sometimes headache. If it settles in your stomach and intestinal tract, it may cause vomiting and diarrhea. Sometimes it causes vague symptoms like "aching all over," feeling tired, loss of appetite, or fever.  A viral illness usually lasts 1 to 2 weeks, but sometimes it lasts longer. In some cases, a more serious infection can look like a viral syndrome in the first few days of the illness. You may need another exam and additional tests to know the difference. Watch for the warning signs listed below.  Home care  Follow these guidelines for taking care of yourself at home:  · If symptoms are severe, rest at home for the first 2 to 3 days.  · Stay away from cigarette smoke - both your smoke and the smoke from others.  · You may use over-the-counter acetaminophen or ibuprofen for fever, muscle aching, and headache, unless another medicine was prescribed for this. If you have chronic liver or kidney disease or ever had a stomach ulcer or GI bleeding, talk with your doctor before using these medicines. No one who is younger than 18 and ill with a fever should take aspirin. It may cause severe disease or death.  · Your appetite may be poor, so a light diet is fine. Avoid dehydration by drinking 8 to 12 8-ounce glasses of fluids each day. This may include water; orange juice; lemonade; apple, grape, and cranberry juice; clear fruit drinks; electrolyte replacement and sports drinks; and decaffeinated teas and coffee. If you have been diagnosed with a kidney disease, ask your doctor how much and what types of fluids you should drink to prevent dehydration. If you have kidney disease, drinking too much fluid can cause it build up in the your body and be dangerous to your " health.  · Over-the-counter remedies won't shorten the length of the illness but may be helpful for cough, sore throat; and nasal and sinus congestion. Don't use decongestants if you have high blood pressure.  Follow-up care  Follow up with your healthcare provider if you do not improve over the next week.  Call 911  Get emergency medical care if any of the following occur:  · Convulsion  · Feeling weak, dizzy, or like you are going to faint  · Chest pain, shortness of breath, wheezing, or difficulty breathing  When to seek medical advice  Call your healthcare provider right away if any of these occur:  · Cough with lots of colored sputum (mucus) or blood in your sputum  · Chest pain, shortness of breath, wheezing, or difficulty breathing  · Severe headache; face, neck, or ear pain  · Severe, constant pain in the lower right side of your belly (abdominal)  · Continued vomiting (cant keep liquids down)  · Frequent diarrhea (more than 5 times a day); blood (red or black color) or mucus in diarrhea  · Feeling weak, dizzy, or like you are going to faint  · Extreme thirst  · Fever of 100.4°F (38°C) or higher, or as directed by your healthcare provider  Date Last Reviewed: 9/25/2015  © 1763-9886 [employer]. [address] [place] [address], [place] [address]. All rights reserved. This information is not intended as a substitute for professional medical care. Always follow your healthcare professional's instructions.

## 2020-03-20 NOTE — PROGRESS NOTES
"Subjective:       Patient ID: Fco Zafar III is a 34 y.o. male.    Vitals:  height is 5' 7" (1.702 m) and weight is 107.5 kg (237 lb). His temperature is 102.2 °F (39 °C) (abnormal). His blood pressure is 128/76 and his pulse is 112 (abnormal). His oxygen saturation is 97%.     Chief Complaint: URI    35 yo male presents today with chief complaint of URI having symptoms of mild headache, nonproductive cough, high-grade fever, and postnasal drip for 3 days. Pt took Dayquil with no releif.    URI    This is a new problem. The current episode started in the past 7 days. The problem has been gradually worsening. The maximum temperature recorded prior to his arrival was 102 - 102.9 F. Associated symptoms include coughing and headaches. Pertinent negatives include no congestion, ear pain, nausea, rash, sinus pain, sore throat, vomiting or wheezing. He has tried acetaminophen for the symptoms. The treatment provided no relief.       Constitution: Positive for fever. Negative for chills, sweating, fatigue and international travel in last 60 days.   HENT: Positive for postnasal drip. Negative for ear pain, congestion, sinus pain, sinus pressure, sore throat and voice change.    Neck: Negative for painful lymph nodes.   Eyes: Negative for eye redness.   Respiratory: Positive for cough. Negative for chest tightness, sputum production, bloody sputum, COPD, shortness of breath, stridor, wheezing and asthma.    Gastrointestinal: Negative for nausea and vomiting.   Musculoskeletal: Negative for muscle ache.   Skin: Negative for rash.   Allergic/Immunologic: Positive for flu shot. Negative for seasonal allergies and asthma.   Neurological: Positive for headaches.   Hematologic/Lymphatic: Negative for swollen lymph nodes.       Objective:      Physical Exam   Constitutional: He appears well-developed and well-nourished.   HENT:   Head: Normocephalic and atraumatic.   Nose: Mucosal edema and rhinorrhea present. Right sinus " exhibits no maxillary sinus tenderness and no frontal sinus tenderness. Left sinus exhibits no maxillary sinus tenderness and no frontal sinus tenderness.   Mouth/Throat: Posterior oropharyngeal erythema present. No oropharyngeal exudate.   Eyes: Pupils are equal, round, and reactive to light. EOM are normal.   Neck: Normal range of motion. Neck supple.   Cardiovascular: Normal rate, regular rhythm and normal heart sounds.   Pulmonary/Chest: Effort normal and breath sounds normal.   Abdominal: Soft.   Nursing note and vitals reviewed.    Results for orders placed or performed in visit on 03/20/20   POCT Influenza A/B   Result Value Ref Range    Rapid Influenza A Ag Negative Negative    Rapid Influenza B Ag Negative Negative     Acceptable Yes          Assessment:       1. Acute viral syndrome        Plan:         Acute viral syndrome  -     POCT Influenza A/B  -     SARS- CoV-2 (COVID-19) QUALITATIVE PCR; Future; Expected date: 03/21/2020  -     benzonatate (TESSALON PERLES) 100 MG capsule; Take 1 capsule (100 mg total) by mouth every 6 (six) hours as needed for Cough.  Dispense: 30 capsule; Refill: 1  -     promethazine-dextromethorphan (PROMETHAZINE-DM) 6.25-15 mg/5 mL Syrp; Take 5 mLs by mouth nightly as needed.  Dispense: 118 mL; Refill: 0

## 2020-03-21 ENCOUNTER — CLINICAL SUPPORT (OUTPATIENT)
Dept: URGENT CARE | Facility: CLINIC | Age: 35
End: 2020-03-21
Payer: COMMERCIAL

## 2020-03-21 DIAGNOSIS — B34.9 ACUTE VIRAL SYNDROME: ICD-10-CM

## 2020-03-21 DIAGNOSIS — R05.9 COUGH: Primary | ICD-10-CM

## 2020-03-21 PROCEDURE — 99000 PR SPECIMEN HANDLING,DR OFF->LAB: ICD-10-PCS | Mod: S$GLB,,, | Performed by: EMERGENCY MEDICINE

## 2020-03-21 PROCEDURE — 99000 SPECIMEN HANDLING OFFICE-LAB: CPT | Mod: S$GLB,,, | Performed by: EMERGENCY MEDICINE

## 2020-03-21 PROCEDURE — U0002 COVID-19 LAB TEST NON-CDC: HCPCS

## 2020-03-23 ENCOUNTER — TELEPHONE (OUTPATIENT)
Dept: URGENT CARE | Facility: CLINIC | Age: 35
End: 2020-03-23

## 2020-03-23 LAB — SARS-COV-2 RNA RESP QL NAA+PROBE: DETECTED

## 2020-03-23 NOTE — TELEPHONE ENCOUNTER
This result was communicated to the patient by Gila Alcantara DNP on 03/23/2020.    Your test was POSITIVE for COVID-19 (coronavirus).     [facility]  Preventing the Spread of Coronavirus Disease 2019 in Homes and Residential Communities      Prevention steps for people with confirmed or suspected COVID-19 (including persons under investigation) who do not need to be hospitalized and people with confirmed COVID-19 who were hospitalized and determined to be medically stable to go home.    Your healthcare provider and public health staff will evaluate whether you can be cared for at home.   Stay home except to get medical care.   Separate yourself from other people and animals in your home   Call ahead before visiting your doctor.   Wear a facemask.   Cover your coughs and sneezes.   Clean your hands often.   Avoid sharing personal household items.   Clean all high-touch surfaces every day.   Monitor your symptoms. Seek prompt medical attention if your illness is worsening (e.g., difficulty breathing). Before seeking care, call your healthcare provider.   If you have a medical emergency and need to call 911, notify the dispatch personnel that you have, or are being evaluated for COVID-19. If possible, put on a facemask before emergency medical services arrive.   Discontinuing home isolation. Call your provider about guidance to discontinue home isolation.    Recommended precautions for household members, intimate partners, and caregivers in a nonhealthcare setting of a patient with symptomatic laboratory-confirmed COVID-19 or a patient under investigation.  Household members, intimate partners, and caregivers in a nonhealthcare setting may have close contact with a person with symptomatic, laboratory-confirmed COVID-19 or a person under investigation. Close contacts should monitor their health; they should call their healthcare provider right away if they develop  symptoms suggestive of COVID-19 (e.g., fever, cough, shortness of breath).    Close contacts should also follow these recommendations:   Make sure that you understand and can help the patient follow their healthcare provider's instructions for medication(s) and care. You should help the patient with basic needs in the home and provide support for getting groceries, prescriptions, and other personal needs.   Monitor the patient's symptoms. If the patient is getting sicker, call his or her healthcare provider and tell them that the patient has laboratory-confirmed COVID-19. This will help the healthcare provider's office take steps to keep other people in the office or waiting room from getting infected. Ask the healthcare provider to call the local or [facility] health department for additional guidance. If the patient has a medical emergency and you need to call 911, notify the dispatch personnel that the patient has, or is being evaluated for COVID-19.   Household members should stay in another room or be  from the patient as much as possible. Household members should use a separate bedroom and bathroom, if available.   Prohibit visitors who do not have an essential need to be in the home.   Household members should care for any pets in the home. Do not handle pets or other animals while sick.   Make sure that shared spaces in the home have good air flow, such as by an air conditioner or an opened window, weather permitting.   Perform hand hygiene frequently. Wash your hands often with soap and water for at least 20 seconds or use an alcohol-based hand  that contains 60 to 95% alcohol, covering all surfaces of your hands and rubbing them together until they feel dry. Soap and water should be used preferentially if hands are visibly dirty.   Avoid touching your eyes, nose, and mouth with unwashed hands.   The patient should wear a facemask when you are around other people. If the patient is not  able to wear a facemask (for example, because it causes trouble breathing), you, as the caregiver should wear a mask when you are in the same room as the patient.   Wear a disposable facemask and gloves when you touch or have contact with the patient's blood, stool, or body fluids, such as saliva, sputum, nasal mucus, vomit, urine.  o Throw out disposable facemasks and gloves after using them. Do not reuse.  o When removing personal protective equipment, first remove and dispose of gloves. Then, immediately clean your hands with soap and water or alcohol-based hand . Next, remove and dispose of facemask, and immediately clean your hands again with soap and water or alcohol-based hand .   Avoid sharing household items with the patient. You should not share dishes, drinking glasses, cups, eating utensils, towels, bedding, or other items. After the patient uses these items, you should wash them thoroughly (see below Wash laundry thoroughly).   Clean all high-touch surfaces, such as counters, tabletops, doorknobs, bathroom fixtures, toilets, phones, keyboards, tablets, and bedside tables, every day. Also, clean any surfaces that may have blood, stool, or body fluids on them.   Use a household cleaning spray or wipe, according to the label instructions. Labels contain instructions for safe and effective use of the cleaning product including precautions you should take when applying the product, such as wearing gloves and making sure you have good ventilation during use of the product.   Wash laundry thoroughly.  o Immediately remove and wash clothes or bedding that have blood, stool, or body fluids on them.  o Wear disposable gloves while handling soiled items and keep soiled items away from your body. Clean your hands (with soap and water or an alcohol-based hand ) immediately after removing your gloves.  o Read and follow directions on labels of laundry or clothing items and  detergent. In general, using a normal laundry detergent according to washing machine instructions and dry thoroughly using the warmest temperatures recommended on the clothing label.   Place all used disposable gloves, facemasks, and other contaminated items in a lined container before disposing of them with other household waste. Clean your hands (with soap and water or an alcohol-based hand ) immediately after handling these items. Soap and water should be used preferentially if hands are visibly dirty.   Discuss any additional questions with your state or local health department or healthcare provider. Check available hours when contacting your local health department.    For more information see CDC link below.      https://www.cdc.gov/coronavirus/2019-ncov/hcp/guidance-prevent-spread.html#precautions              If your symptoms worsen or if you have any other concerns, please contact [facility] at [phone].     Sincerely,     Gila Alcantara DNP

## 2020-03-24 ENCOUNTER — PATIENT MESSAGE (OUTPATIENT)
Dept: INTERNAL MEDICINE | Facility: CLINIC | Age: 35
End: 2020-03-24

## 2020-03-24 DIAGNOSIS — Z79.4 TYPE 2 DIABETES MELLITUS WITH COMPLICATION, WITH LONG-TERM CURRENT USE OF INSULIN: ICD-10-CM

## 2020-03-24 DIAGNOSIS — E11.65 TYPE 2 DIABETES MELLITUS WITH HYPERGLYCEMIA, WITH LONG-TERM CURRENT USE OF INSULIN: ICD-10-CM

## 2020-03-24 DIAGNOSIS — E11.8 TYPE 2 DIABETES MELLITUS WITH COMPLICATION, WITH LONG-TERM CURRENT USE OF INSULIN: ICD-10-CM

## 2020-03-24 DIAGNOSIS — Z79.4 TYPE 2 DIABETES MELLITUS WITH HYPERGLYCEMIA, WITH LONG-TERM CURRENT USE OF INSULIN: ICD-10-CM

## 2020-03-24 RX ORDER — PEN NEEDLE, DIABETIC 30 GX3/16"
NEEDLE, DISPOSABLE MISCELLANEOUS
Qty: 400 EACH | Refills: 3 | Status: SHIPPED | OUTPATIENT
Start: 2020-03-24 | End: 2020-04-28 | Stop reason: SDUPTHER

## 2020-03-24 RX ORDER — INSULIN GLARGINE 100 [IU]/ML
28 INJECTION, SOLUTION SUBCUTANEOUS 2 TIMES DAILY
Qty: 15 ML | Refills: 5
Start: 2020-03-24 | End: 2020-03-26 | Stop reason: SDUPTHER

## 2020-03-25 ENCOUNTER — PATIENT MESSAGE (OUTPATIENT)
Dept: INTERNAL MEDICINE | Facility: CLINIC | Age: 35
End: 2020-03-25

## 2020-03-26 ENCOUNTER — PATIENT MESSAGE (OUTPATIENT)
Dept: INTERNAL MEDICINE | Facility: CLINIC | Age: 35
End: 2020-03-26

## 2020-03-26 DIAGNOSIS — E11.65 TYPE 2 DIABETES MELLITUS WITH HYPERGLYCEMIA, WITH LONG-TERM CURRENT USE OF INSULIN: ICD-10-CM

## 2020-03-26 DIAGNOSIS — E11.8 TYPE 2 DIABETES MELLITUS WITH COMPLICATION, WITH LONG-TERM CURRENT USE OF INSULIN: ICD-10-CM

## 2020-03-26 DIAGNOSIS — Z79.4 TYPE 2 DIABETES MELLITUS WITH COMPLICATION, WITH LONG-TERM CURRENT USE OF INSULIN: ICD-10-CM

## 2020-03-26 DIAGNOSIS — Z79.4 TYPE 2 DIABETES MELLITUS WITH HYPERGLYCEMIA, WITH LONG-TERM CURRENT USE OF INSULIN: ICD-10-CM

## 2020-03-26 RX ORDER — INSULIN GLARGINE 100 [IU]/ML
28 INJECTION, SOLUTION SUBCUTANEOUS 2 TIMES DAILY
Qty: 18 ML | Refills: 5 | Status: SHIPPED | OUTPATIENT
Start: 2020-03-26 | End: 2020-04-28

## 2020-03-27 ENCOUNTER — PATIENT OUTREACH (OUTPATIENT)
Dept: ADMINISTRATIVE | Facility: OTHER | Age: 35
End: 2020-03-27

## 2020-03-30 ENCOUNTER — PATIENT MESSAGE (OUTPATIENT)
Dept: INTERNAL MEDICINE | Facility: CLINIC | Age: 35
End: 2020-03-30

## 2020-03-30 ENCOUNTER — TELEPHONE (OUTPATIENT)
Dept: DIABETES | Facility: CLINIC | Age: 35
End: 2020-03-30

## 2020-03-30 ENCOUNTER — CLINICAL SUPPORT (OUTPATIENT)
Dept: DIABETES | Facility: CLINIC | Age: 35
End: 2020-03-30
Payer: COMMERCIAL

## 2020-03-30 DIAGNOSIS — Z79.4 TYPE 2 DIABETES MELLITUS WITH HYPERGLYCEMIA, WITH LONG-TERM CURRENT USE OF INSULIN: Primary | ICD-10-CM

## 2020-03-30 DIAGNOSIS — E11.65 TYPE 2 DIABETES MELLITUS WITH HYPERGLYCEMIA, WITH LONG-TERM CURRENT USE OF INSULIN: Primary | ICD-10-CM

## 2020-03-30 PROCEDURE — G0108 PR DIAB MANAGE TRN  PER INDIV: ICD-10-PCS | Mod: 95,,, | Performed by: DIETITIAN, REGISTERED

## 2020-03-30 PROCEDURE — G0108 DIAB MANAGE TRN  PER INDIV: HCPCS | Mod: 95,,, | Performed by: DIETITIAN, REGISTERED

## 2020-03-30 NOTE — PROGRESS NOTES
Telephone - [id number] ([phone])     This visit was conducted using Telehealth/Telephonic Technology. It was in the patient's best interest to receive diabetes self-management education and support services in this format to prevent exposure to COVID-19           Diabetes Education  Author: Vlaencia Adamson RD  Date: 3/30/2020    Diabetes Care Management Summary  Diabetes Education Record Assessment/Progress: Post Program/Follow-up     Last A1c:   Lab Results   Component Value Date    HGBA1C >14.0 (H) 02/01/2020     Last Visit with Diabetes Educator: : 04/25/2018  Last OPCM Referral: : Not Found   Enrolled in OPCM: No      Patient with follow up today. Recently diagnosed with HILDA. Reviewed CGMs as well as insulin delivery devices vs MDI. Patient states that neither were approved. Currently not monitoring 2' COVID19 dx. Administering Lantus 30u BID. Encouraged patient to monitor blood sugars consistently and log for follow up.     Diabetes Type  Diabetes Type : (HILDA)         Health Maintenance was reviewed today with patient. Discussed with patient importance of routine eye exams, foot exams/foot care, blood work (i.e.: A1c, microalbumin, and lipid), dental visits, yearly flu vaccine, and pneumonia vaccine as indicated by PCP. Patient verbalized understanding.     Health Maintenance Topics with due status: Not Due       Topic Last Completion Date    TETANUS VACCINE 05/25/2016    Eye Exam 05/06/2019    Foot Exam 01/28/2020    Lipid Panel 02/01/2020    Hemoglobin A1c 02/01/2020     There are no preventive care reminders to display for this patient.                                                                                                 Today's Self-Management Care Plan was developed with the patient's input and is based on barriers identified during today's assessment.    The long and short-term goals in the care plan were written with the patient/caregiver's input. The patient has agreed to work toward these  goals to improve his overall diabetes control.      The patient received a copy of today's self-management plan and verbalized understanding of the care plan, goals, and all of today's instructions.      The patient was encouraged to communicate with his physician and care team regarding his condition(s) and treatment.  I provided the patient with my contact information today and encouraged him to contact me via phone or patient portal as needed.

## 2020-04-08 ENCOUNTER — PATIENT MESSAGE (OUTPATIENT)
Dept: INTERNAL MEDICINE | Facility: CLINIC | Age: 35
End: 2020-04-08

## 2020-04-08 RX ORDER — GABAPENTIN 300 MG/1
600 CAPSULE ORAL NIGHTLY
Qty: 180 CAPSULE | Refills: 2 | Status: SHIPPED | OUTPATIENT
Start: 2020-04-08 | End: 2020-07-29

## 2020-04-25 ENCOUNTER — PATIENT MESSAGE (OUTPATIENT)
Dept: INTERNAL MEDICINE | Facility: CLINIC | Age: 35
End: 2020-04-25

## 2020-04-25 ENCOUNTER — LAB VISIT (OUTPATIENT)
Dept: LAB | Facility: HOSPITAL | Age: 35
End: 2020-04-25
Attending: FAMILY MEDICINE
Payer: COMMERCIAL

## 2020-04-25 DIAGNOSIS — E11.65 TYPE 2 DIABETES MELLITUS WITH HYPERGLYCEMIA, WITH LONG-TERM CURRENT USE OF INSULIN: ICD-10-CM

## 2020-04-25 DIAGNOSIS — Z79.4 TYPE 2 DIABETES MELLITUS WITH HYPERGLYCEMIA, WITH LONG-TERM CURRENT USE OF INSULIN: ICD-10-CM

## 2020-04-25 LAB
ESTIMATED AVG GLUCOSE: 260 MG/DL (ref 68–131)
HBA1C MFR BLD HPLC: 10.7 % (ref 4–5.6)

## 2020-04-25 PROCEDURE — 83036 HEMOGLOBIN GLYCOSYLATED A1C: CPT

## 2020-04-25 PROCEDURE — 36415 COLL VENOUS BLD VENIPUNCTURE: CPT

## 2020-04-27 ENCOUNTER — PATIENT MESSAGE (OUTPATIENT)
Dept: INTERNAL MEDICINE | Facility: CLINIC | Age: 35
End: 2020-04-27

## 2020-04-28 ENCOUNTER — OFFICE VISIT (OUTPATIENT)
Dept: INTERNAL MEDICINE | Facility: CLINIC | Age: 35
End: 2020-04-28
Payer: COMMERCIAL

## 2020-04-28 VITALS
HEART RATE: 95 BPM | BODY MASS INDEX: 33.64 KG/M2 | SYSTOLIC BLOOD PRESSURE: 110 MMHG | HEIGHT: 67 IN | WEIGHT: 214.31 LBS | OXYGEN SATURATION: 95 % | DIASTOLIC BLOOD PRESSURE: 76 MMHG

## 2020-04-28 DIAGNOSIS — Z79.4 TYPE 2 DIABETES MELLITUS WITH COMPLICATION, WITH LONG-TERM CURRENT USE OF INSULIN: ICD-10-CM

## 2020-04-28 DIAGNOSIS — E11.65 TYPE 2 DIABETES MELLITUS WITH HYPERGLYCEMIA, WITH LONG-TERM CURRENT USE OF INSULIN: ICD-10-CM

## 2020-04-28 DIAGNOSIS — E13.9 LADA (LATENT AUTOIMMUNE DIABETES IN ADULTS), MANAGED AS TYPE 1: Primary | ICD-10-CM

## 2020-04-28 DIAGNOSIS — Z79.4 TYPE 2 DIABETES MELLITUS WITH HYPERGLYCEMIA, WITH LONG-TERM CURRENT USE OF INSULIN: ICD-10-CM

## 2020-04-28 DIAGNOSIS — E11.8 TYPE 2 DIABETES MELLITUS WITH COMPLICATION, WITH LONG-TERM CURRENT USE OF INSULIN: ICD-10-CM

## 2020-04-28 DIAGNOSIS — E78.2 MIXED HYPERLIPIDEMIA: ICD-10-CM

## 2020-04-28 DIAGNOSIS — E66.9 OBESITY (BMI 30-39.9): ICD-10-CM

## 2020-04-28 DIAGNOSIS — I10 ESSENTIAL HYPERTENSION: ICD-10-CM

## 2020-04-28 PROCEDURE — 99999 PR PBB SHADOW E&M-EST. PATIENT-LVL V: CPT | Mod: PBBFAC,,, | Performed by: NURSE PRACTITIONER

## 2020-04-28 PROCEDURE — 3046F PR MOST RECENT HEMOGLOBIN A1C LEVEL > 9.0%: ICD-10-PCS | Mod: CPTII,S$GLB,, | Performed by: NURSE PRACTITIONER

## 2020-04-28 PROCEDURE — 3078F DIAST BP <80 MM HG: CPT | Mod: CPTII,S$GLB,, | Performed by: NURSE PRACTITIONER

## 2020-04-28 PROCEDURE — 3078F PR MOST RECENT DIASTOLIC BLOOD PRESSURE < 80 MM HG: ICD-10-PCS | Mod: CPTII,S$GLB,, | Performed by: NURSE PRACTITIONER

## 2020-04-28 PROCEDURE — 3046F HEMOGLOBIN A1C LEVEL >9.0%: CPT | Mod: CPTII,S$GLB,, | Performed by: NURSE PRACTITIONER

## 2020-04-28 PROCEDURE — 99999 PR PBB SHADOW E&M-EST. PATIENT-LVL V: ICD-10-PCS | Mod: PBBFAC,,, | Performed by: NURSE PRACTITIONER

## 2020-04-28 PROCEDURE — 3074F PR MOST RECENT SYSTOLIC BLOOD PRESSURE < 130 MM HG: ICD-10-PCS | Mod: CPTII,S$GLB,, | Performed by: NURSE PRACTITIONER

## 2020-04-28 PROCEDURE — 3008F PR BODY MASS INDEX (BMI) DOCUMENTED: ICD-10-PCS | Mod: CPTII,S$GLB,, | Performed by: NURSE PRACTITIONER

## 2020-04-28 PROCEDURE — 3074F SYST BP LT 130 MM HG: CPT | Mod: CPTII,S$GLB,, | Performed by: NURSE PRACTITIONER

## 2020-04-28 PROCEDURE — 99214 OFFICE O/P EST MOD 30 MIN: CPT | Mod: S$GLB,,, | Performed by: NURSE PRACTITIONER

## 2020-04-28 PROCEDURE — 3008F BODY MASS INDEX DOCD: CPT | Mod: CPTII,S$GLB,, | Performed by: NURSE PRACTITIONER

## 2020-04-28 PROCEDURE — 99214 PR OFFICE/OUTPT VISIT, EST, LEVL IV, 30-39 MIN: ICD-10-PCS | Mod: S$GLB,,, | Performed by: NURSE PRACTITIONER

## 2020-04-28 RX ORDER — LANCETS
EACH MISCELLANEOUS
Qty: 400 EACH | Refills: 3 | Status: SHIPPED | OUTPATIENT
Start: 2020-04-28 | End: 2020-07-29

## 2020-04-28 RX ORDER — PEN NEEDLE, DIABETIC 30 GX3/16"
NEEDLE, DISPOSABLE MISCELLANEOUS
Qty: 400 EACH | Refills: 3 | Status: SHIPPED | OUTPATIENT
Start: 2020-04-28 | End: 2020-07-29

## 2020-04-28 RX ORDER — INSULIN GLARGINE 100 [IU]/ML
INJECTION, SOLUTION SUBCUTANEOUS
Qty: 15 ML | Refills: 6 | Status: SHIPPED | OUTPATIENT
Start: 2020-04-28 | End: 2020-07-29

## 2020-04-28 RX ORDER — INSULIN LISPRO 100 [IU]/ML
INJECTION, SOLUTION INTRAVENOUS; SUBCUTANEOUS
Qty: 1 BOX | Refills: 6 | Status: SHIPPED | OUTPATIENT
Start: 2020-04-28 | End: 2022-06-14

## 2020-04-28 NOTE — PROGRESS NOTES
CHIEF COMPLAINT: Type 1 Diabetes/HILDA     HPI: Mr. Fco Zafar III is a 34 y.o. male who was diagnosed with Type 2 DM in 2012,  mg/dl (initial)  Started insulin in 2012.  2/1/2020 c peptide type 1 dm/HILDA diagnosis   Reviewed family history  +familial history of dm : mother   Has h/o ED, HTN, HLD.   Has pcp Dr. Lanza now.  +covid 19 3/21/2020  Had SOB/LUGO, had pronounce tendencies w/ taste/smell, loss appetite, fatigue, chest discomfort  During the past 1-2 weeks, stopped taking trulicity and diabetic meds as consistent.  Appetite is back, loss weight 23#  Had pain in feet, on gabapentin, started exercising-push ups, set ups, dumbbells, cycling-will start.   Feels that he got is from an event, covid19.    a1c is trending down   From >14% to 10.7%    , 147, 60, 97, 300s (covid 19 time period)  Highest: mid 200s    omnipod dash $400 out of pocket, too much    PREVIOUS DIABETES MEDICATIONS TRIED  Metformin   lantus     CURRENT DIABETIC MEDS: lantus 28 units bid , metformin 1000 mg bid, trulicity 0.75 mg weekly (Tuesday)    On lantus bid, will start boluses 4 x a day today   Testing max 4  x a day  Patient is willing and able to use the device  Demonstrated an understanding of the technology and is motivated to use CGM  Patient expected to adhere to a comprehensive diabetes treatment plan and patient has adequate medical supervision    Diabetes Management Status    Statin: Taking  ACE/ARB: Taking    Screening or Prevention Patient's value Goal Complete/Controlled?   HgA1C Testing and Control   Lab Results   Component Value Date    HGBA1C 10.7 (H) 04/25/2020      Annually/Less than 8% No   Lipid profile : 02/01/2020 Annually No   LDL control Lab Results   Component Value Date    LDLCALC 91.0 02/01/2020    Annually/Less than 100 mg/dl  No   Nephropathy screening Lab Results   Component Value Date    LABMICR 211.0 02/01/2020     Lab Results   Component Value Date    PROTEINUA Negative 04/25/2018    Annually  "No   Blood pressure BP Readings from Last 1 Encounters:   04/28/20 110/76    Less than 140/90 Yes   Dilated retinal exam : 05/06/2019 Annually Yes   Foot exam   : 01/28/2020 Annually No     REVIEW OF SYSTEMS  General: no weakness, fatigue, +weight changes (loss 23#).   Eyes: no double or blurred vision, eye pain, or redness  Cardiovascular: no chest pain, palpitations, edema, or murmurs.   Respiratory: no cough or dyspnea.   GI: no heartburn, nausea, or changes in bowel patterns; good appetite.   Skin: no rashes, dryness, itching, or reactions at insulin injection sites.  Neuro: +numbness, tingling-improved, off gabapentin now, tremors, or vertigo.   Endocrine: no polyuria, polydipsia, polyphagia, heat or cold intolerance.     Vital Signs  /76 (BP Location: Left arm, Patient Position: Sitting, BP Method: Large (Manual))   Pulse 95   Ht 5' 7" (1.702 m)   Wt 97.2 kg (214 lb 4.6 oz)   SpO2 95%   BMI 33.56 kg/m²     Hemoglobin A1C   Date Value Ref Range Status   04/25/2020 10.7 (H) 4.0 - 5.6 % Final     Comment:     ADA Screening Guidelines:  5.7-6.4%  Consistent with prediabetes  >or=6.5%  Consistent with diabetes  High levels of fetal hemoglobin interfere with the HbA1C  assay. Heterozygous hemoglobin variants (HbS, HgC, etc)do  not significantly interfere with this assay.   However, presence of multiple variants may affect accuracy.     02/01/2020 >14.0 (H) 4.0 - 5.6 % Final     Comment:     ADA Screening Guidelines:  5.7-6.4%  Consistent with prediabetes  >or=6.5%  Consistent with diabetes  High levels of fetal hemoglobin interfere with the HbA1C  assay. Heterozygous hemoglobin variants (HbS, HgC, etc)do  not significantly interfere with this assay.   However, presence of multiple variants may affect accuracy.     04/28/2018 11.0 (H) 4.0 - 5.6 % Final     Comment:     According to ADA guidelines, hemoglobin A1c <7.0% represents  optimal control in non-pregnant diabetic patients. Different  metrics may " apply to specific patient populations.   Standards of Medical Care in Diabetes-2016.  For the purpose of screening for the presence of diabetes:  <5.7%     Consistent with the absence of diabetes  5.7-6.4%  Consistent with increasing risk for diabetes   (prediabetes)  >or=6.5%  Consistent with diabetes  Currently, no consensus exists for use of hemoglobin A1c  for diagnosis of diabetes for children.  This Hemoglobin A1c assay has significant interference with fetal   hemoglobin   (HbF). The results are invalid for patients with abnormal amounts of   HbF,   including those with known Hereditary Persistence   of Fetal Hemoglobin. Heterozygous hemoglobin variants (HbAS, HbAC,   HbAD, HbAE, HbA2) do not significantly interfere with this assay;   however, presence of multiple variants in a sample may impact the %   interference.          Chemistry        Component Value Date/Time     02/01/2020 1219    K 3.8 02/01/2020 1219     02/01/2020 1219    CO2 25 02/01/2020 1219    BUN 7 02/01/2020 1219    CREATININE 0.9 02/01/2020 1219     (H) 02/01/2020 1219        Component Value Date/Time    CALCIUM 9.6 02/01/2020 1219    ALKPHOS 117 02/01/2020 1219    AST 17 02/01/2020 1219    ALT 14 02/01/2020 1219    BILITOT 0.5 02/01/2020 1219    ESTGFRAFRICA >60.0 02/01/2020 1219    EGFRNONAA >60.0 02/01/2020 1219           Lab Results   Component Value Date    TSH 0.579 02/01/2020      Lab Results   Component Value Date    CHOL 149 02/01/2020    CHOL 107 (L) 04/28/2018    CHOL 127 11/12/2016     Lab Results   Component Value Date    HDL 40 02/01/2020    HDL 31 (L) 04/28/2018    HDL 35 (L) 11/12/2016     Lab Results   Component Value Date    LDLCALC 91.0 02/01/2020    LDLCALC 67.6 04/28/2018    LDLCALC 76.6 11/12/2016     Lab Results   Component Value Date    TRIG 90 02/01/2020    TRIG 42 04/28/2018    TRIG 77 11/12/2016     Lab Results   Component Value Date    CHOLHDL 26.8 02/01/2020    CHOLHDL 29.0 04/28/2018     CHOLHDL 27.6 11/12/2016         PHYSICAL EXAMINATION  Constitutional: Appears well, no distress. Reviewed vitals above.  Eyes: conjunctivae & lids intact; PERRLA, EOMs intact; optic discs   Neck: Supple, trachea midline.   Respiratory: No wheezes, even and unlabored  Cardiovascular: no edema or varicosities  Lymph: deferred  Skin: warm and dry; no injection site reactions, no acanthosis nigracans observed.  Neuro:patient alert and cooperative, normal affect; steady gait.  Psychiatric: judgement & insight intact, orientation of time, place & person intact, memory; mood & affect wnl     Diabetes Foot Exam:   deferred    Assessment/Plan  1. 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 Obesity (BMI 30-39.9)     3. Mixed hyperlipidemia     4. Essential hypertension       1. F/u in 3 mos w/ me  a1c next time   a1c goal less than 7%  Add humalog 12 units ac w/ meals scale 180-230+2, etc  Light meal 6 units  Logs given   Change lantus to 40 units at night  Continue trulicity 0.75 mg weekly  Continue metformin 1000 mg bid   Send chart note off for dexcom   bg goal  mg/dl  2. Body mass index is 33.56 kg/m². may increase insulin resistance  Has loss weight >20#  3.   Lab Results   Component Value Date    LDLCALC 91.0 02/01/2020     At goal   4. Controlled    FOLLOW UP  Follow up in about 3 months (around 7/28/2020).

## 2020-04-28 NOTE — PATIENT INSTRUCTIONS
Insulin Lispro injection  What is this medicine?  INSULIN LISPRO (IN karolyn ortiz) is a human-made form of insulin. This drug lowers the amount of sugar in your blood. This medicine is a rapid-acting insulin that starts working faster than regular insulin. It will not work as long as regular insulin.  How should I use this medicine?  This medicine is for injection under the skin or infusion into a vein. This medicine may be given by health care professional in a hospital or clinic setting. It is important to follow the directions given to you by your health care professional or doctor. You should inject this medicine within 15 minutes before or after your meal. Have food ready before injection. Do not delay eating. You will be taught how to use this medicine and how to adjust doses for activities and illness. Do not use more insulin than prescribed. Do not use more or less often than prescribed.  Always check the appearance of your insulin before using it. This medicine should be clear and colorless like water. Do not use it if it is cloudy, thickened, colored, or has solid particles in it.  It is important that you put your used needles and syringes in a special sharps container. Do not put them in a trash can. If you do not have a sharps container, call your pharmacist or healthcare provider to get one.  Talk to your pediatrician regarding the use of this medicine in children. Special care may be needed.  What side effects may I notice from receiving this medicine?  Side effects that you should report to your health care professional or doctor as soon as possible:  · allergic reactions like skin rash, itching or hives, swelling of the face, lips, or tongue  · breathing problems  · signs and symptoms of high blood sugar such as dizziness, dry mouth, dry skin, fruity breath, nausea, stomach pain, increased hunger or thirst, increased urination  · signs and symptoms of low blood sugar such as feeling anxious,  confusion, dizziness, increased hunger, unusually weak or tired, sweating, shakiness, cold, irritable, headache, blurred vision, fast heartbeat, loss of consciousness  Side effects that usually do not require medical attention (report to your health care professional or doctor if they continue or are bothersome):  · increase or decrease in fatty tissue under the skin due to overuse of a particular injection site  · itching, burning, swelling, or rash at site where injected  What may interact with this medicine?  · other medicines for diabetes  Many medications may cause an increase or decrease in blood sugar, these include:  · alcohol containing beverages  · aspirin and aspirin-like drugs  · chloramphenicol  · chromium  · diuretics  · female hormones, like estrogens or progestins and birth control pills  · heart medicines  · isoniazid  · male hormones or anabolic steroids  · medicines for weight loss  · medicines for allergies, asthma, cold, or cough  · medicines for mental problems  · medicines called MAO Inhibitors like Nardil, Parnate, Marplan, Eldepryl  · niacin  · NSAIDs, medicines for pain and inflammation, like ibuprofen or naproxen  · pentamidine  · phenytoin  · probenecid  · quinolone antibiotics like ciprofloxacin, levofloxacin, ofloxacin  · some herbal dietary supplements  · steroid medicines like prednisone or cortisone  · thyroid medicine  Some medications can hide the warning symptoms of low blood sugar. You may need to monitor your blood sugar more closely if you are taking one of these medications. These include:  · beta-blockers such as atenolol, metoprolol, propranolol  · clonidine  · guanethidine  · reserpine  What if I miss a dose?  It is important not to miss a dose. Your health care professional or doctor should discuss a plan for missed doses with you. If you do miss a dose, follow their plan. Do not take double doses.  Where should I keep my medicine?  Keep out of the reach of  children.  Store unopened insulin vials in a refrigerator between 2 and 8 degrees C (36 and 46 degrees F). Do not freeze or use if the insulin has been frozen. Opened vials (vials currently in use) may be stored in the refrigerator or at room temperature, at approximately 30 degrees C (86 degrees F) or cooler. Keeping your insulin at room temperature decreases the amount of pain during injection. Once opened, your insulin can be used for 28 days. After 28 days, the vial of insulin should be thrown away.  Store unopened cartridges or disposable pens in a refrigerator between 2 and 8 degrees C (36 and 46 degrees F.) Do not freeze or use if the insulin has been frozen. Once opened, the disposable pens and cartridges that are inserted into pens should be kept at room temperature, approximately 30 degrees C (80 degrees F) or cooler. Do not store in the refrigerator. Once opened, the insulin can be used for 28 days. After 28 days, the cartridge or disposable pen should be thrown away.  Protect from light and excessive heat. Throw away any unused medicine after the expiration date or after the specified time for room temperature storage has passed.  What should I tell my health care provider before I take this medicine?  They need to know if you have any of these conditions:  · episodes of hypoglycemia  · kidney disease  · liver disease  · an unusual or allergic reaction to insulin, metacresol, other medicines, foods, dyes, or preservatives  · pregnant or trying to get pregnant  · breast-feeding  What should I watch for while using this medicine?  Visit your health care professional or doctor for regular checks on your progress.  A test called the HbA1C (A1C) will be monitored. This is a simple blood test. It measures your blood sugar control over the last 2 to 3 months. You will receive this test every 3 to 6 months.  Learn how to check your blood sugar. Learn the symptoms of low and high blood sugar and how to manage  them.  Always carry a quick-source of sugar with you in case you have symptoms of low blood sugar. Examples include hard sugar candy or glucose tablets. Make sure others know that you can choke if you eat or drink when you develop serious symptoms of low blood sugar, such as seizures or unconsciousness. They must get medical help at once.  Tell your doctor or health care professional if you have high blood sugar. You might need to change the dose of your medicine. If you are sick or exercising more than usual, you might need to change the dose of your medicine.  Do not skip meals. Ask your doctor or health care professional if you should avoid alcohol. Many nonprescription cough and cold products contain sugar or alcohol. These can affect blood sugar.  Make sure that you have the right kind of syringe for the type of insulin you use. Try not to change the brand and type of insulin or syringe unless your health care professional or doctor tells you to. Switching insulin brand or type can cause dangerously high or low blood sugar. Always keep an extra supply of insulin, syringes, and needles on hand. Use a syringe one time only. Throw away syringe and needle in a closed container to prevent accidental needle sticks.  Insulin pens and cartridges should never be shared. Even if the needle is changed, sharing may result in passing of viruses like hepatitis or HIV.  Wear a medical ID bracelet or chain, and carry a card that describes your disease and details of your medicine and dosage times.  NOTE:This sheet is a summary. It may not cover all possible information. If you have questions about this medicine, talk to your doctor, pharmacist, or health care provider. Copyright© 2017 [employer]        Hypoglycemia (Low Blood Sugar)     Fast-acting sugar includes a cup of nonfat milk.     Too little sugar (glucose) in your blood is called hypoglycemia or low blood sugar. Low blood sugar usually means anything lower than 70  mg/dL. Talk with your healthcare provider about your target range and what level is too low for you. Diabetes itself doesnt cause low blood sugar. But some of the treatments for diabetes, such as pills or insulin, may raise your risk for it. Low blood sugar may cause you to pass out or have a seizure. So always treat low blood sugar right away, but don't overeat.  Special note: Always carry a source of fast-acting sugar and a snack in case of hypoglycemia.   What you may notice  If you have low blood sugar, you may have one or more of these symptoms:  · Shakiness or dizziness  · Cold, clammy skin or sweating  · Feelings of hunger  · Headache  · Nervousness  · A hard, fast heartbeat  · Weakness  · Confusion or irritability  · Blurred vision  · Having nightmares or waking up confused or sweating  · Numbness or tingling in the lips or tongue  What you should do  Here are tips to follow if you have hypoglycemia:   · First check your blood sugar. If it is too low (out of your target range), eat or drink 15 to 20 grams of fast-acting sugar. This may be 3 to 4 glucose tablets, 4 ounces (half a cup) of fruit juice or regular (nondiet) soda, 8 ounces (1 cup) of fat-free milk, or 1 tablespoon of honey. Dont take more than this, or your blood sugar may go too high.  · Wait 15 minutes. Then recheck your blood sugar if you can.  · If your blood sugar is still too low, repeat the steps above and check your blood sugar again. If your blood sugar still has not returned to your target range, contact your healthcare provider or seek emergency care.  · Once your blood sugar returns to target range, eat a snack or meal.  Preventing low blood sugar  Things you can do include the following:   · If your condition needs a strict treatment plan, eat your meals and snacks at the same times each day. Dont skip meals!  · If your treatment plan lets you change when you eat and what you eat, learn how to change the time and dose of your  rapid-acting insulin to match this.   · Ask your healthcare provider if it is safe for you to drink alcohol. Never drink on an empty stomach.  · Take your medicine at the prescribed times.  · Always carry a source of fast-acting sugar and a snack when youre away from home.  Other things to do  Additional tips include the following:  · Carry a medical ID card, a compact USB drive, or wear a medical alert bracelet or necklace. It should say that you have diabetes. It should also say what to do if you pass out or have a seizure.  · Make sure your family, friends, and coworkers know the signs of low blood sugar. Tell them what to do if your blood sugar falls very low and you cant treat yourself.  · Keep a glucagon emergency kit handy. Be sure your family, friends, and coworkers know how and when to use it. Check it regularly and replace the glucagon before it expires.  · Talk with your health care team about other things you can do to prevent low blood sugar.     If you have unexplained hypoglycemia or hypoglycemia several times, call your healthcare provider.   Date Last Reviewed: 5/1/2016 © 2000-2017 [employer]. [address] [place] [address], [place] [address]. All rights reserved. This information is not intended as a substitute for professional medical care. Always follow your healthcare professional's instructions.

## 2020-05-15 ENCOUNTER — PATIENT OUTREACH (OUTPATIENT)
Dept: ADMINISTRATIVE | Facility: OTHER | Age: 35
End: 2020-05-15

## 2020-05-15 DIAGNOSIS — E10.29 TYPE 1 DIABETES MELLITUS WITH MICROALBUMINURIA: Primary | ICD-10-CM

## 2020-05-15 DIAGNOSIS — R80.9 TYPE 1 DIABETES MELLITUS WITH MICROALBUMINURIA: Primary | ICD-10-CM

## 2020-06-10 ENCOUNTER — PATIENT OUTREACH (OUTPATIENT)
Dept: ADMINISTRATIVE | Facility: OTHER | Age: 35
End: 2020-06-10

## 2020-06-12 ENCOUNTER — TELEPHONE (OUTPATIENT)
Dept: DIABETES | Facility: CLINIC | Age: 35
End: 2020-06-12

## 2020-06-17 DIAGNOSIS — Z79.4 TYPE 2 DIABETES MELLITUS WITH COMPLICATION, WITH LONG-TERM CURRENT USE OF INSULIN: ICD-10-CM

## 2020-06-17 DIAGNOSIS — E11.8 TYPE 2 DIABETES MELLITUS WITH COMPLICATION, WITH LONG-TERM CURRENT USE OF INSULIN: ICD-10-CM

## 2020-06-17 RX ORDER — METFORMIN HYDROCHLORIDE 1000 MG/1
1000 TABLET ORAL 2 TIMES DAILY WITH MEALS
Qty: 180 TABLET | Refills: 3 | Status: SHIPPED | OUTPATIENT
Start: 2020-06-17 | End: 2021-08-05

## 2020-06-26 ENCOUNTER — CLINICAL SUPPORT (OUTPATIENT)
Dept: DIABETES | Facility: CLINIC | Age: 35
End: 2020-06-26
Payer: COMMERCIAL

## 2020-06-26 DIAGNOSIS — E11.65 TYPE 2 DIABETES MELLITUS WITH HYPERGLYCEMIA, WITH LONG-TERM CURRENT USE OF INSULIN: Primary | ICD-10-CM

## 2020-06-26 DIAGNOSIS — Z79.4 TYPE 2 DIABETES MELLITUS WITH HYPERGLYCEMIA, WITH LONG-TERM CURRENT USE OF INSULIN: Primary | ICD-10-CM

## 2020-06-26 NOTE — PROGRESS NOTES
When attempt to link into video conference call, the patient's side of screen was blue- no audio could be heard. I attempt to call patient at [phone] X3 attepts- no answer- left message asking pt to call me back so we could figure out what was happening with video conference call.    Shauna Escamilla RN, CCM

## 2020-07-09 ENCOUNTER — PATIENT OUTREACH (OUTPATIENT)
Dept: ADMINISTRATIVE | Facility: OTHER | Age: 35
End: 2020-07-09

## 2020-07-10 ENCOUNTER — CLINICAL SUPPORT (OUTPATIENT)
Dept: DIABETES | Facility: CLINIC | Age: 35
End: 2020-07-10
Payer: COMMERCIAL

## 2020-07-10 DIAGNOSIS — E11.8 TYPE 2 DIABETES MELLITUS WITH COMPLICATION, WITH LONG-TERM CURRENT USE OF INSULIN: ICD-10-CM

## 2020-07-10 DIAGNOSIS — E11.65 TYPE 2 DIABETES MELLITUS WITH HYPERGLYCEMIA, WITH LONG-TERM CURRENT USE OF INSULIN: Primary | ICD-10-CM

## 2020-07-10 DIAGNOSIS — Z79.4 TYPE 2 DIABETES MELLITUS WITH COMPLICATION, WITH LONG-TERM CURRENT USE OF INSULIN: ICD-10-CM

## 2020-07-10 DIAGNOSIS — I10 ESSENTIAL HYPERTENSION: ICD-10-CM

## 2020-07-10 DIAGNOSIS — Z79.4 TYPE 2 DIABETES MELLITUS WITH HYPERGLYCEMIA, WITH LONG-TERM CURRENT USE OF INSULIN: Primary | ICD-10-CM

## 2020-07-10 PROCEDURE — G0108 PR DIAB MANAGE TRN  PER INDIV: ICD-10-PCS | Mod: 95,,, | Performed by: INTERNAL MEDICINE

## 2020-07-10 PROCEDURE — G0108 DIAB MANAGE TRN  PER INDIV: HCPCS | Mod: 95,,, | Performed by: INTERNAL MEDICINE

## 2020-07-10 RX ORDER — LISINOPRIL 2.5 MG/1
2.5 TABLET ORAL DAILY
Qty: 90 TABLET | Refills: 1 | Status: SHIPPED | OUTPATIENT
Start: 2020-07-10 | End: 2021-02-02 | Stop reason: SDUPTHER

## 2020-07-10 NOTE — PROGRESS NOTES
Requested updates within Care Everywhere.  Patient's chart was reviewed for overdue OZ topics.  Immunizations reconciled.

## 2020-07-10 NOTE — PROGRESS NOTES
Diabetes Education  Author: Shauna Escamilla RN  Date: 7/10/2020      Diabetes Care Specialist Virtual Visit Note   It was in the patient's best interest to receive diabetes self-management education and support services in this format to prevent the exposure to COVID-19.        The patient location is: home   The chief complaint leading to consultation is: Diabetes  Visit type: audiovisual  Total time spent with patient: 60 min   Each patient to whom he or she provides medical services by telemedicine is:  (1) informed of the relationship between the physician and patient and the respective role of any other health care provider with respect to management of the patient; and (2) notified that he or she may decline to receive medical services by telemedicine and may withdraw from such care at any time.    Diabetes Care Management Summary  Diabetes Education Record Assessment/Progress: Initial  Current Diabetes Risk Level: High     Last A1c:   Lab Results   Component Value Date    HGBA1C 10.7 (H) 04/25/2020     Last Visit with Diabetes Educator: Last Education Visit: Not Found  Last OPCM Referral: : Not Found   Enrolled in OPCM: No      Diabetes Type  Diabetes Type : Type II    Diabetes History  Current Treatment: Oral Medication, Injectable, Insulin  Reviewed Problem List with Patient: Yes    Health Maintenance was reviewed today with patient. Discussed with patient importance of routine eye exams, foot exams/foot care, blood work (i.e.: A1c, microalbumin, and lipid), dental visits, yearly flu vaccine, and pneumonia vaccine as indicated by PCP. Patient verbalized understanding.     Health Maintenance Topics with due status: Not Due       Topic Last Completion Date    TETANUS VACCINE 05/25/2016    Influenza Vaccine 04/25/2018    Foot Exam 01/28/2020    Lipid Panel 02/01/2020    Hemoglobin A1c 04/25/2020     Health Maintenance Due   Topic Date Due    HIV Screening  10/17/1999    Eye Exam  05/06/2020        Nutrition  Meal Planning: diet drinks, 3 meals per day, drinks regular soda, eats out seldom, artificial sweeteners, water, snacks between meal  What type of sweetener do you use?: sugar  What type of beverages do you drink?: regular soda/tea, diet soda/tea, milk, juice, other (see comments), water(coffee with sweet n low)  Meal Plan 24 Hour Recall - Breakfast: grits, egg, sausage, biscuit, diet sprite  Meal Plan 24 Hour Recall - Lunch: fried chicken breast, sweet tea  Meal Plan 24 Hour Recall - Dinner: jambalaya,minute maid fruit punch  Meal Plan 24 Hour Recall - Snack: granola bar, cereal bars, grapes, banana    Monitoring   Monitoring: Other  Self Monitoring : SMB-2 times per day. Has not checked his blood sugar at all this week. PM : 140-280  Blood Glucose Logs: No  Do you use a personal continuous glucose monitor?: No  In the last month, how often have you had a low blood sugar reaction?: (In the past)  What are your symptoms of low blood sugar?: shaky  How do you treat low blood sugar?: drank juice or eat honey bun  Can you tell when your blood sugar is too high?: yes  How do you treat high blood sugar?: drinks water    Exercise   Exercise Type: exercise class, walking  Intensity: Moderate  Frequency: 3-5 Times per week  Duration: 1 hour    Current Diabetes Treatment   Current Treatment: Oral Medication, Injectable, Insulin    Social History  Preferred Learning Method: Video  Primary Support: Self, Family, Friends  Smoking Status: Never a Smoker                                Barriers to Change  Barriers to Change: None  Learning Challenges : None    Readiness to Learn   Readiness to Learn : Acceptance    Cultural Influences  Cultural Influences: No    Diabetes Education Assessment/Progress      Diabetes Disease Process (diabetes disease process and treatment options): Discussion, Individual Session, Written Materials Provided, Instructed, Comprehends Key Points  Discussed qualifying parameters of  diabetes dx. Reviewed/Instructed on disease process and pt's most likely causes of recently 10.7 A1c. Discussed current treatment options, especially dietary and lifestyle changes as well as medication additions and/or changes. Patient verbalizes understanding of received information/education.       Nutrition (Incorporating nutritional management into one's lifestyle): Discussion, Individual Session, Written Materials Provided, Instructed, Comprehends Key Points  Emphasized importance of eliminating all SSB. Discussed SF drink options. Discussed carb vs non-carb foods and reviewed appropriate amounts of carbs to have at meals vs snacks. Recommended 45-60 gm carb at meals and 15 gm carb at snacks. Instructed on appropriate label reading and serving sizes of specific carb containing foods. Reviewed need to limit total/saturated fats. Discussed meal plans and snack ideas amenable to pt. Reviewed the plate method. Patient verbalizes understanding of received information/education.         Physical Activity (incorporating physical activity into one's lifestyle): Discussion, Individual Session, Written Materials Provided, Instructed, Comprehends Key Points  Discussed goals and benefits of regular Physical Activity. Reviewed difference between active lifestyle and structured physical activity. Encouraged Physical Activity with increased heart rate for sustained duration as tolerated. Reviewed Physical Activity goals of >150 minutes weekly. Patient verbalizes understanding of received information/education.         Medications (states correct name, dose, onset, peak, duration, side effects & timing of meds): Discussion, Individual Session, Written Materials Provided, Instructed, Comprehends Key Points  Discussed mechanism of action of oral diabetic medication metformin. Reviewed signs and symptoms of hypoglycemia and treatment with Rule of 15. Discussed general vs severe hyperglycemia and risk of DKA. Patient verbalizes  understanding of received information/education.   Discussed timing and mechanism of action of rapid and long acting insulin. Reviewed need for rotation of injection sites, appropriate insulin storage, timing of insulin, safe disposal of used sharps and insulin pen preparation and use, including performing a prime shot of 2 units prior to injection and keeping pen in skin for a count of 10 before removing. Discussed possible side effects and how to avoid these. Reviewed hypoglycemia and treatment with Rule of 15. Discussed general vs severe hyperglycemia and risk of DKA. Emphasized need to take lantus at same time daily and need to take humalog >4 hours apart. Emphasized need to take humalog injections 5-10 min prior to eating meals. Patient verbalizes understanding of received information/education.       Mechanism of action of GLP-1 injection explained. Discussed possible side effects. Reviewed need for rotation of injection sites, appropriate storage, expiration date, safe disposal of used sharps and pen preparation and use. Patient verbalizes understanding of received information/education.       Monitoring (monitoring blood glucose/other parameters & using results): Discussion, Individual Session, Written Materials Provided, Instructed, Comprehends Key Points  Discussed goal BGs for different times of day and in relation to meals. Pt only checking twice epr day- not with every meal. Instructed pt to test BG 3-4 times per day: fasting and 2-hours after any meal. Reviewed need for updated BG logs for all endo, PCP, and education appts. Patient verbalizes understanding of received information/education.         Acute Complications (preventing, detecting, and treating acute complications): Discussion, Individual Session, Written Materials Provided, Instructed, Comprehends Key Points  Discussed hypoglycemia vs hyperglycemia symptoms and discussed appropriate treatments for each. Reviewed that pt is at  risk of hypo  with current medication regimen. Discussed general vs severe hyperglycemia and risk of DKA.      Chronic Complications (preventing, detecting, and treating chronic complications): Discussion, Individual Session, Written Materials Provided, Instructed, Comprehends Key Points(Referral to podiatry and optometry)  Reviewed annual diabetes care schedule and patient priorities. Patient verbalizes understanding of received information/education.         Clinical (diabetes, other pertinent medical history, and relevant comorbidities reviewed during visit): Discussion, Individual Session, Instructed  Reviewed current medical history including co-morbidities.      Cognitive (knowledge of self-management skills, functional health literacy): Discussion, Individual Session, Instructed    Arrives with adequate health management knowledge - poor specific knowledge of diabetes management. Leaves with increased knowledge base - will benefit from f/u in  3 months.      Psychosocial (emotional response to diabetes): Not Covered/Deferred  Diabetes Distress and Support Systems: Not Covered/Deferred        Behavioral (readiness for change, lifestyle practices, self-care behaviors): Discussion, Individual Session, Instructed  Appears  motivated to make recommended changes.        Goals  Patient has selected/evaluated goals during today's session: Yes, selected  Healthy Eating: Set(Will measure food and read food labels. Will count carbohydrates to equal 45-60 gm per meal. Will abstain from drinking Sugar Beverages and utilize Diet and/or artificial sweetener.)  Start Date: 07/10/20  Target Date: 10/10/20         Diabetes Care Plan/Intervention  Education Plan/Intervention: Individual Follow-Up DSMT    Diabetes Meal Plan  Restrictions: Restricted Carbohydrate  Carbohydrate Per Meal: 45-60g  Carbohydrate Per Snack : 7-15g    Today's Self-Management Care Plan was developed with the patient's input and is based on barriers identified during  today's assessment.    The long and short-term goals in the care plan were written with the patient/caregiver's input. The patient has agreed to work toward these goals to improve his overall diabetes control.      The patient received a copy of today's self-management plan and verbalized understanding of the care plan, goals, and all of today's instructions.      The patient was encouraged to communicate with his physician and care team regarding his condition(s) and treatment.  I provided the patient with my contact information today and encouraged him to contact me via phone or patient portal as needed.     Education Units of Time   Time Spent: 60 min

## 2020-07-28 ENCOUNTER — LAB VISIT (OUTPATIENT)
Dept: LAB | Facility: HOSPITAL | Age: 35
End: 2020-07-28
Attending: FAMILY MEDICINE
Payer: COMMERCIAL

## 2020-07-28 DIAGNOSIS — E13.9 LADA (LATENT AUTOIMMUNE DIABETES IN ADULTS), MANAGED AS TYPE 1: ICD-10-CM

## 2020-07-28 LAB
ESTIMATED AVG GLUCOSE: 212 MG/DL (ref 68–131)
HBA1C MFR BLD HPLC: 9 % (ref 4–5.6)

## 2020-07-28 PROCEDURE — 36415 COLL VENOUS BLD VENIPUNCTURE: CPT

## 2020-07-28 PROCEDURE — 83036 HEMOGLOBIN GLYCOSYLATED A1C: CPT

## 2020-07-29 ENCOUNTER — OFFICE VISIT (OUTPATIENT)
Dept: INTERNAL MEDICINE | Facility: CLINIC | Age: 35
End: 2020-07-29
Payer: COMMERCIAL

## 2020-07-29 VITALS
WEIGHT: 240 LBS | OXYGEN SATURATION: 98 % | DIASTOLIC BLOOD PRESSURE: 64 MMHG | SYSTOLIC BLOOD PRESSURE: 126 MMHG | HEIGHT: 67 IN | HEART RATE: 86 BPM | BODY MASS INDEX: 37.67 KG/M2

## 2020-07-29 DIAGNOSIS — Z79.4 TYPE 2 DIABETES MELLITUS WITH COMPLICATION, WITH LONG-TERM CURRENT USE OF INSULIN: ICD-10-CM

## 2020-07-29 DIAGNOSIS — R80.9 TYPE 1 DIABETES MELLITUS WITH MICROALBUMINURIA: Primary | ICD-10-CM

## 2020-07-29 DIAGNOSIS — E10.29 TYPE 1 DIABETES MELLITUS WITH MICROALBUMINURIA: Primary | ICD-10-CM

## 2020-07-29 DIAGNOSIS — E11.65 TYPE 2 DIABETES MELLITUS WITH HYPERGLYCEMIA, WITH LONG-TERM CURRENT USE OF INSULIN: ICD-10-CM

## 2020-07-29 DIAGNOSIS — I10 ESSENTIAL HYPERTENSION: ICD-10-CM

## 2020-07-29 DIAGNOSIS — E13.9 LADA (LATENT AUTOIMMUNE DIABETES IN ADULTS), MANAGED AS TYPE 1: ICD-10-CM

## 2020-07-29 DIAGNOSIS — E11.8 TYPE 2 DIABETES MELLITUS WITH COMPLICATION, WITH LONG-TERM CURRENT USE OF INSULIN: ICD-10-CM

## 2020-07-29 DIAGNOSIS — Z79.4 TYPE 2 DIABETES MELLITUS WITH HYPERGLYCEMIA, WITH LONG-TERM CURRENT USE OF INSULIN: ICD-10-CM

## 2020-07-29 DIAGNOSIS — E10.69 DIABETIC ERECTILE DYSFUNCTION ASSOCIATED WITH TYPE 1 DIABETES MELLITUS: ICD-10-CM

## 2020-07-29 DIAGNOSIS — E66.9 OBESITY (BMI 30-39.9): ICD-10-CM

## 2020-07-29 DIAGNOSIS — N52.1 DIABETIC ERECTILE DYSFUNCTION ASSOCIATED WITH TYPE 1 DIABETES MELLITUS: ICD-10-CM

## 2020-07-29 DIAGNOSIS — E78.2 MIXED HYPERLIPIDEMIA: ICD-10-CM

## 2020-07-29 PROCEDURE — 3052F HG A1C>EQUAL 8.0%<EQUAL 9.0%: CPT | Mod: CPTII,S$GLB,, | Performed by: NURSE PRACTITIONER

## 2020-07-29 PROCEDURE — 3074F SYST BP LT 130 MM HG: CPT | Mod: CPTII,S$GLB,, | Performed by: NURSE PRACTITIONER

## 2020-07-29 PROCEDURE — 3052F PR MOST RECENT HEMOGLOBIN A1C LEVEL 8.0 - < 9.0%: ICD-10-PCS | Mod: CPTII,S$GLB,, | Performed by: NURSE PRACTITIONER

## 2020-07-29 PROCEDURE — 99999 PR PBB SHADOW E&M-EST. PATIENT-LVL III: ICD-10-PCS | Mod: PBBFAC,,, | Performed by: NURSE PRACTITIONER

## 2020-07-29 PROCEDURE — 3074F PR MOST RECENT SYSTOLIC BLOOD PRESSURE < 130 MM HG: ICD-10-PCS | Mod: CPTII,S$GLB,, | Performed by: NURSE PRACTITIONER

## 2020-07-29 PROCEDURE — 99214 OFFICE O/P EST MOD 30 MIN: CPT | Mod: S$GLB,,, | Performed by: NURSE PRACTITIONER

## 2020-07-29 PROCEDURE — 3008F PR BODY MASS INDEX (BMI) DOCUMENTED: ICD-10-PCS | Mod: CPTII,S$GLB,, | Performed by: NURSE PRACTITIONER

## 2020-07-29 PROCEDURE — 3078F DIAST BP <80 MM HG: CPT | Mod: CPTII,S$GLB,, | Performed by: NURSE PRACTITIONER

## 2020-07-29 PROCEDURE — 3008F BODY MASS INDEX DOCD: CPT | Mod: CPTII,S$GLB,, | Performed by: NURSE PRACTITIONER

## 2020-07-29 PROCEDURE — 99999 PR PBB SHADOW E&M-EST. PATIENT-LVL III: CPT | Mod: PBBFAC,,, | Performed by: NURSE PRACTITIONER

## 2020-07-29 PROCEDURE — 99214 PR OFFICE/OUTPT VISIT, EST, LEVL IV, 30-39 MIN: ICD-10-PCS | Mod: S$GLB,,, | Performed by: NURSE PRACTITIONER

## 2020-07-29 PROCEDURE — 3078F PR MOST RECENT DIASTOLIC BLOOD PRESSURE < 80 MM HG: ICD-10-PCS | Mod: CPTII,S$GLB,, | Performed by: NURSE PRACTITIONER

## 2020-07-29 RX ORDER — INSULIN GLARGINE 100 [IU]/ML
INJECTION, SOLUTION SUBCUTANEOUS
Qty: 15 ML | Refills: 6
Start: 2020-07-29 | End: 2020-07-29

## 2020-07-29 RX ORDER — FLASH GLUCOSE SCANNING READER
EACH MISCELLANEOUS
Qty: 1 EACH | Refills: 0 | Status: SHIPPED | OUTPATIENT
Start: 2020-08-01 | End: 2022-06-14

## 2020-07-29 RX ORDER — CALCIUM CARB/VITAMIN D3/VIT K1 500-500-40
TABLET,CHEWABLE ORAL
COMMUNITY
Start: 2020-04-28

## 2020-07-29 RX ORDER — INSULIN GLARGINE 100 [IU]/ML
INJECTION, SOLUTION SUBCUTANEOUS
Qty: 15 ML | Refills: 6
Start: 2020-07-29 | End: 2021-07-03 | Stop reason: SDUPTHER

## 2020-07-29 RX ORDER — PEN NEEDLE, DIABETIC 32 GX 1/4"
NEEDLE, DISPOSABLE MISCELLANEOUS
COMMUNITY
Start: 2020-04-23 | End: 2020-07-29 | Stop reason: SDUPTHER

## 2020-07-29 RX ORDER — FLASH GLUCOSE SENSOR
KIT MISCELLANEOUS
Qty: 2 KIT | Refills: 6 | Status: SHIPPED | OUTPATIENT
Start: 2020-08-01 | End: 2022-06-14

## 2020-07-29 RX ORDER — PEN NEEDLE, DIABETIC 32 GX 1/4"
NEEDLE, DISPOSABLE MISCELLANEOUS
Qty: 400 EACH | Refills: 3 | Status: SHIPPED | OUTPATIENT
Start: 2020-07-29 | End: 2022-06-14

## 2020-07-29 RX ORDER — DULAGLUTIDE 1.5 MG/.5ML
1.5 INJECTION, SOLUTION SUBCUTANEOUS
Qty: 4 PEN | Refills: 6 | Status: SHIPPED | OUTPATIENT
Start: 2020-07-29 | End: 2021-04-14

## 2020-07-29 NOTE — PATIENT INSTRUCTIONS
Eat fit julius  [email or web address].diabetes.org food hub  [email or web address]     humalog meal insulin 12-12-12 w/ meals  Hold if not eating  180-230+2, 231-280+4 281-330+6, 331-380+8, >380 +10  lantus 30 units at night, goal in am < 130  trulicity-increase to 1.5 mg weekly  Metformin continue 1000 mg twice a day          Snacks can be an important part of a balanced, healthy meal plan. They allow you to eat more frequently, feeling full and satisfied throughout the day. Also, they allow you to spread carbohydrates evenly, which may stabilize blood sugars.  Plus, snacks are enjoyable!     The amount of carbohydrate needed at snacks varies. Generally, about 15-30 grams of carbohydrate per snack is recommended.  Below you will find some tasty treats.       0-5 gm carb   Crystal Light   Vitamin Water Zero   Herbal tea, unsweetened   2 tsp peanut butter on celery   1./2 cup sugar-free jell-o   1 sugar-free popsicle   ¼ cup blueberries   8oz Blue Neisha unsweetened almond milk   5 baby carrots & celery sticks, cucumbers, bell peppers dipped in ¼ cup salsa, 2Tbsp light ranch dressing or 2Tbsp plain Greek yogurt   10 Goldfish crackers   ½ oz low-fat cheese or string cheese   1 closed handful of nuts, unsalted   1 Tbsp of sunflower seeds, unsalted   1 cup Smart Pop popcorn   1 whole grain brown rice cake        15 gm carb   1 small piece of fruit or ½ banana or 1/2 cup lite canned fruit   3 linsey cracker squares   3 cups Smart Pop popcorn, top spray butter, Grimes lite salt or cinnamon and Truvia   5 Vanilla Wafers   ½ cup low fat, no added sugar ice cream or frozen yogurt (Blue bell, Blue Bunny, Weight Watchers, Skinny Cow)   ½ turkey, ham, or chicken sandwich   ½ c fruit with ½ c Cottage cheese   4-6 unsalted wheat crackers with 1 oz low fat cheese or 1 tbsp peanut butter    30-45 goldfish crackers (depending on flavor)    7-8 Jainism mini brown rice cakes (caramel, apple cinnamon, chocolate)    12 Jainism  mini brown rice cakes (cheddar, bbq, ranch)    1/3 cup hummus dip with raw veg   1/2 whole wheat martin, 1Tbsp hummus   Mini Pizza (1/2 whole wheat English muffin, low-fat  cheese, tomato sauce)   100 calorie snack pack (Oreo, Chips Ahoy, Ritz Mix, Baked Cheetos)   4-6 oz. light or Greek Style yogurt (Chobani, Yoplait, Okios, [place]field)   ½ cup sugar-free pudding     6 in. wheat tortilla or martin oven toasted chips (topped with spray butter flavoring, cinnamon, Truvia OR spray butter, garlic powder, chili powder)    18 BBQ Popchips (available at [employer], Whole Foods, Fresh Market)       Managing Diabetes: The A1C Test       Healthy red blood cells have some glucose stuck to them. A high A1C means that unhealthy amounts of glucose are stuck to the cells.   What is the A1C test?  Using your meter helps you track your blood sugar every day. But your glucose meter tells you the value at the time of testing only. You also need to know if your treatment plan is keeping you healthy over time. The hemoglobin A1C (or glycated hemoglobin) test can help. This test measures your average blood sugar level over a few months. A higher A1C result means that you have a higher risk of developing complications.  The A1C test  The A1C is a blood test done by your healthcare provider. You will likely have an A1C test every 3 to 6 months.  Your blood glucose goal  A1C has been shown as a percentage. But it can also be shown as a number representing the estimated Average Glucose (eAG). Unlike the A1C percentage, eAG is a number similar to the numbers listed on your daily glucose monitor. Both A1C and eAG measure the amount of glucose stuck to a protein called hemoglobin in red blood cells. Your healthcare provider will help you figure out what your ideal A1C or eAG should be. Your target number will depend on your age, general health, and other factors. If your current number is too high, your treatment plan may need changes, such  as different medicines.  Sample results  Most people aim for an A1c lower than 7%. Thats an eAG less than 154 mg/dL. Or, your healthcare provider may want you to aim for an A1C of 6%. Thats an eAG of 126 mg/dL.     Glucose calculator  Visit http://professional.diabetes.org/diapro/glucose_calc for a chart that helps convert your A1C percentages into eAG numbers.   Date Last Reviewed: 6/1/2016 © 2000-2017 [employer]. [address] [place] [address]. All rights reserved. This information is not intended as a substitute for professional medical care. Always follow your healthcare professional's instructions.        Understanding Carbohydrates, Fats, and Protein  Food is a source of fuel and nourishment for your body. Its also a source of pleasure. Having diabetes doesnt mean you have to eat special foods or give up desserts. Instead, your dietitian can show you how to plan meals to suit your body. To start, learn how different foods affect blood sugar.  Carbohydrates  Carbohydrates are the main source of fuel for the body. Carbohydrates raise blood sugar. Many people think carbohydrates are only found in pasta or bread. But carbohydrates are actually in many kinds of foods:  · Sugars occur naturally in foods such as fruit, milk, honey, and molasses. Sugars can also be added to many foods, from cereals and yogurt to candy and desserts. Sugars raise blood sugar.  · Starches are found in bread, cereals, pasta, and dried beans. Theyre also found in corn, peas, potatoes, yam, acorn squash, and butternut squash. Starches also raise blood sugar.   · Fiber is found in foods such as vegetables, fruits, beans, and whole grains. Unlike other carbs, fiber isnt digested or absorbed. So it doesnt raise blood sugar. In fact, fiber can help keep blood sugar from rising too fast. It also helps keep blood cholesterol at a healthy level.  Did you know?  Even though carbohydrates raise blood sugar, its  best to have some in every meal. They are an important part of a healthy diet.   Fat  Fat is an energy source that can be stored until needed. Fat does not raise blood sugar. However, it can raise blood cholesterol, increasing the risk of heart disease. Fat is also high in calories, which can cause weight gain. Not all types of fat are the same.  More Healthy:  · Monounsaturated fats are mostly found in vegetable oils, such as olive, canola, and peanut oils. They are also found in avocados and some nuts. Monounsaturated fats are healthy for your heart. Thats because they lower LDL (unhealthy) cholesterol.  · Polyunsaturated fats are mostly found in vegetable oils, such as corn, safflower, and soybean oils. They are also found in some seeds, nuts, and fish. Polyunsaturated fats lower LDL (unhealthy) cholesterol. So, choosing them instead of saturated fats is healthy for your heart. Certain unsaturated fats can help lower triglycerides.   Less Healthy:  · Saturated fats are found in animal products, such as meat, poultry, whole milk, lard, and butter. Saturated fats raise LDL cholesterol and are not healthy for your heart.  · Hydrogenated oils and trans fats are formed when vegetable oils are processed into solid fats. They are found in many processed foods. Hydrogenated oils and trans fats raise LDL cholesterol and lower HDL (healthy) cholesterol. They are not healthy for your heart.  Protein  Protein helps the body build and repair muscle and other tissue. Protein has little or no effect on blood sugar. However, many foods that contain protein also contain saturated fat. By choosing low-fat protein sources, you can get the benefits of protein without the extra fat:  · Plant protein is found in dry beans and peas, nuts, and soy products, such as tofu and soymilk. These sources tend to be cholesterol-free and low in saturated fat.  · Animal protein is found in fish, poultry, meat, cheese, milk, and eggs. These  contain cholesterol and can be high in saturated fat. Aim for lean, lower-fat choices.  Date Last Reviewed: 3/1/2016  © 1002-4835 [employer]. [address] [place] [address], [place] [address]. All rights reserved. This information is not intended as a substitute for professional medical care. Always follow your healthcare professional's instructions.        Exercise: Adding Intensity  You have been exercising for 30 minutes most days of the week. Now you can move on to the next stage: increasing the intensity. This means doing your activity in one or more of these ways:  · Longer. Exercise for 30 minutes or more without a break.  · Faster. Hike, run, or skate fast enough to raise your heart rate moderately--as if you had walked fast to catch a bus.  · More often. Do your activity 4 to 6 times a week instead of 1 to 3 times.    Not just gym class  Be creative. You can reach your health and fitness goals in many ways. Try some of these activities:  · Team sports, like basketball or soccer  · Social or recreational activities, like hiking or dancing  · Individual exercise, like cycling, swimming, or skating  · Group fitness classes, like aerobic classes or weight training  Safety first  Whatever activity you choose, think about safety:  · Wear the right safety gear and shoes for your activity.  · Drink plenty of water during and after workouts.  · Wear light-colored clothing if youre out when its dark.  · Make time to warm up before you exercise and cool down after.  · Carry ID (identification) with you if youre out alone. And be sure someone knows where youre going.  · If youre on foot, travel against traffic (except on blind corners). If youre on a bike, go with traffic. Obey the rules of the road.   Tips for sticking with it  · Find a workout partner or sports club. If you know someone is expecting you, youll be less likely to skip your workout.  · Pack a workout bag with everything you need. Then its  ready when you are.  · Choose a few different activities so youll stay interested. Make it fun!  What will help you to stick with it?  1.   2.   3.   Date Last Reviewed: 8/13/2015  © 3957-3399 [employer]. [address] [place] [address], [place] [address]. All rights reserved. This information is not intended as a substitute for professional medical care. Always follow your healthcare professional's instructions.

## 2020-07-29 NOTE — PROGRESS NOTES
CHIEF COMPLAINT: Type 1 Diabetes/HILDA     HPI: Mr. Fco Zafar III is a 34 y.o. male who was diagnosed with Type 2 DM in 2012,  mg/dl (initial)  Started insulin in 2012.    2/1/2020 c peptide type 1 dm/HILDA diagnosis     Price point issues for omnipod dash $400  dexcom g6 too expensive ~$300   a1c has gone done from April 2020 10.7% to 9%  Lab Results   Component Value Date    HGBA1C 9.0 (H) 07/28/2020     On MDI   Testing 4 times a day  Injections 4 x a day  Type 1 demonstrated an understanding of technology and motivated to use the device correctly. Patients are expected to adhere to a diabetes treatment plan and are capable of recognizing alerts and alarms.    Lowest 72  Highest 386- r/t carb heavy meal      Reviewed family history  +familial history of dm : mother   Has h/o ED, HTN, HLD.   Has pcp Dr. Lanza now.  +covid 19 3/21/2020    Had SOB/LUGO, had pronounce tendencies w/ taste/smell, loss appetite, fatigue, chest discomfort  During the past 1-2 weeks, stopped taking trulicity and diabetic meds as consistent.  Appetite is back, loss weight 23#  Had pain in feet, on gabapentin, started exercising-push ups, set ups, dumbbells, cycling-will start.   Feels that he got is from an event, covid19.    Since then, has gain back 26#, has gain back appetite, harder to work out r/t rainy days, likes to do work outs outside      PREVIOUS DIABETES MEDICATIONS TRIED  Metformin   lantus   humalog    CURRENT DIABETIC MEDS: lantus  30 units at night, humalog 12 units w/ meals, no scale, metformin 1000 mg bid, trulicity 0.75 mg weekly       Diabetes Management Status    Statin: Taking  ACE/ARB: Taking    Screening or Prevention Patient's value Goal Complete/Controlled?   HgA1C Testing and Control   Lab Results   Component Value Date    HGBA1C 9.0 (H) 07/28/2020      Annually/Less than 8% No   Lipid profile : 02/01/2020 Annually No   LDL control Lab Results   Component Value Date    LDLCALC 91.0 02/01/2020     "Annually/Less than 100 mg/dl  No   Nephropathy screening Lab Results   Component Value Date    LABMICR 211.0 02/01/2020     Lab Results   Component Value Date    PROTEINUA Negative 04/25/2018    Annually No   Blood pressure BP Readings from Last 1 Encounters:   07/29/20 126/64    Less than 140/90 Yes   Dilated retinal exam : 05/06/2019 Annually Yes   Foot exam   : 01/28/2020 Annually No     REVIEW OF SYSTEMS  General: no weakness, fatigue, +weight gain 26# in 3 mos  Eyes: no double or blurred vision, eye pain, or redness  Cardiovascular: no chest pain, palpitations, edema, or murmurs.   Respiratory: no cough or dyspnea.   GI: no heartburn, nausea, or changes in bowel patterns; good appetite.   Skin: no rashes, dryness, itching, or reactions at insulin injection sites.  Neuro: +numbness, tingling-improved, tremors, or vertigo.   Endocrine: no polyuria, polydipsia, polyphagia, heat or cold intolerance.     Vital Signs  /64   Pulse 86   Ht 5' 7" (1.702 m)   Wt 108.9 kg (240 lb)   SpO2 98%   BMI 37.59 kg/m²     Hemoglobin A1C   Date Value Ref Range Status   07/28/2020 9.0 (H) 4.0 - 5.6 % Final     Comment:     ADA Screening Guidelines:  5.7-6.4%  Consistent with prediabetes  >or=6.5%  Consistent with diabetes  High levels of fetal hemoglobin interfere with the HbA1C  assay. Heterozygous hemoglobin variants (HbS, HgC, etc)do  not significantly interfere with this assay.   However, presence of multiple variants may affect accuracy.     04/25/2020 10.7 (H) 4.0 - 5.6 % Final     Comment:     ADA Screening Guidelines:  5.7-6.4%  Consistent with prediabetes  >or=6.5%  Consistent with diabetes  High levels of fetal hemoglobin interfere with the HbA1C  assay. Heterozygous hemoglobin variants (HbS, HgC, etc)do  not significantly interfere with this assay.   However, presence of multiple variants may affect accuracy.     02/01/2020 >14.0 (H) 4.0 - 5.6 % Final     Comment:     ADA Screening Guidelines:  5.7-6.4%  " Consistent with prediabetes  >or=6.5%  Consistent with diabetes  High levels of fetal hemoglobin interfere with the HbA1C  assay. Heterozygous hemoglobin variants (HbS, HgC, etc)do  not significantly interfere with this assay.   However, presence of multiple variants may affect accuracy.          Chemistry        Component Value Date/Time     02/01/2020 1219    K 3.8 02/01/2020 1219     02/01/2020 1219    CO2 25 02/01/2020 1219    BUN 7 02/01/2020 1219    CREATININE 0.9 02/01/2020 1219     (H) 02/01/2020 1219        Component Value Date/Time    CALCIUM 9.6 02/01/2020 1219    ALKPHOS 117 02/01/2020 1219    AST 17 02/01/2020 1219    ALT 14 02/01/2020 1219    BILITOT 0.5 02/01/2020 1219    ESTGFRAFRICA >60.0 02/01/2020 1219    EGFRNONAA >60.0 02/01/2020 1219           Lab Results   Component Value Date    TSH 0.579 02/01/2020      Lab Results   Component Value Date    CHOL 149 02/01/2020    CHOL 107 (L) 04/28/2018    CHOL 127 11/12/2016     Lab Results   Component Value Date    HDL 40 02/01/2020    HDL 31 (L) 04/28/2018    HDL 35 (L) 11/12/2016     Lab Results   Component Value Date    LDLCALC 91.0 02/01/2020    LDLCALC 67.6 04/28/2018    LDLCALC 76.6 11/12/2016     Lab Results   Component Value Date    TRIG 90 02/01/2020    TRIG 42 04/28/2018    TRIG 77 11/12/2016     Lab Results   Component Value Date    CHOLHDL 26.8 02/01/2020    CHOLHDL 29.0 04/28/2018    CHOLHDL 27.6 11/12/2016       PHYSICAL EXAMINATION  Constitutional: Appears well, no distress. Reviewed vitals above.  Eyes: conjunctivae & lids intact; PERRLA, EOMs intact; optic discs   Neck: Supple, trachea midline.   Respiratory: No wheezes, even and unlabored  Cardiovascular: no edema or varicosities  Lymph: deferred  Skin: warm and dry; no injection site reactions, no acanthosis nigracans observed.  Neuro:patient alert and cooperative, normal affect; steady gait.  Psychiatric: judgement & insight intact, orientation of time, place & person  intact, memory; mood & affect wnl     Diabetes Foot Exam:   deferred    Assessment/Plan  1. Type 1 diabetes mellitus with microalbuminuria  Hemoglobin A1C    Ambulatory referral/consult to Diabetes Education   2. 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 Diabetic erectile dysfunction associated with type 1 diabetes mellitus     4. Mixed hyperlipidemia     5. Obesity (BMI 30-39.9)     6. Essential hypertension       1.-3. F/u in 3-4 mos w/ me  a1c has improved  Goal less than 7%  Add scale 180-230+2, 231-280+4, etc   Discussed vgo 30  4 cilcks with meals  1 click if sugar is >180  1 click with snack  Discussed adalberto 2   Will get price info from rep today   dexcom too expensive, pump therapy too expensiv  DE needed for other options   Increase trulicity to 1.5 mg weekly  Continue regimen above w/ insulin at the time  Fasting  mg/dl  4.   Lab Results   Component Value Date    LDLCALC 91.0 02/01/2020     At goal   5. Body mass index is 37.59 kg/m². may increase insulin resistance  6. Continue med(s)  Controlled    FOLLOW UP  Follow up in about 3 months (around 10/29/2020).

## 2020-10-05 ENCOUNTER — PATIENT MESSAGE (OUTPATIENT)
Dept: ADMINISTRATIVE | Facility: HOSPITAL | Age: 35
End: 2020-10-05

## 2020-10-27 ENCOUNTER — PATIENT OUTREACH (OUTPATIENT)
Dept: ADMINISTRATIVE | Facility: OTHER | Age: 35
End: 2020-10-27

## 2020-10-27 NOTE — PROGRESS NOTES
Care Everywhere: updated  Immunization: updated(delay in links)   Health Maintenance: updated  Media Review: review for outside eye exam report   Legacy Review:   Order placed:   Upcoming appts:  Optometry referral 7/10/2020

## 2020-11-04 ENCOUNTER — OCCUPATIONAL HEALTH (OUTPATIENT)
Dept: URGENT CARE | Facility: CLINIC | Age: 35
End: 2020-11-04

## 2020-11-04 DIAGNOSIS — Z02.83 ENCOUNTER FOR DRUG SCREENING: Primary | ICD-10-CM

## 2020-11-04 PROCEDURE — 80305 OOH COLLECTION ONLY DRUG SCREEN: ICD-10-PCS | Mod: S$GLB,,, | Performed by: PHYSICIAN ASSISTANT

## 2020-11-04 PROCEDURE — 80305 DRUG TEST PRSMV DIR OPT OBS: CPT | Mod: S$GLB,,, | Performed by: PHYSICIAN ASSISTANT

## 2021-01-01 ENCOUNTER — HOSPITAL ENCOUNTER (EMERGENCY)
Facility: HOSPITAL | Age: 36
Discharge: HOME | End: 2021-01-01
Attending: EMERGENCY MEDICINE
Payer: COMMERCIAL

## 2021-01-01 VITALS
OXYGEN SATURATION: 96 % | BODY MASS INDEX: 37.67 KG/M2 | WEIGHT: 240 LBS | DIASTOLIC BLOOD PRESSURE: 83 MMHG | RESPIRATION RATE: 16 BRPM | HEART RATE: 100 BPM | SYSTOLIC BLOOD PRESSURE: 141 MMHG | HEIGHT: 67 IN | TEMPERATURE: 98 F

## 2021-01-01 DIAGNOSIS — N34.2 URETHRITIS: Primary | ICD-10-CM

## 2021-01-01 LAB
BILIRUB UR QL STRIP: NEGATIVE
CLARITY UR: CLEAR
COLOR UR: YELLOW
GLUCOSE UR QL STRIP: ABNORMAL
HGB UR QL STRIP: NEGATIVE
KETONES UR QL STRIP: ABNORMAL
LEUKOCYTE ESTERASE UR QL STRIP: NEGATIVE
NITRITE UR QL STRIP: NEGATIVE
PH UR STRIP: 6 [PH] (ref 5–8)
PROT UR QL STRIP: ABNORMAL
SP GR UR STRIP: 1.02 (ref 1–1.03)
URN SPEC COLLECT METH UR: ABNORMAL
UROBILINOGEN UR STRIP-ACNC: NEGATIVE EU/DL

## 2021-01-01 PROCEDURE — 63600175 PHARM REV CODE 636 W HCPCS: Performed by: EMERGENCY MEDICINE

## 2021-01-01 PROCEDURE — 63700000 PHARM REV CODE 250 ALT 637 W/O HCPCS: Performed by: EMERGENCY MEDICINE

## 2021-01-01 PROCEDURE — 25000003 PHARM REV CODE 250: Performed by: EMERGENCY MEDICINE

## 2021-01-01 PROCEDURE — 99284 EMERGENCY DEPT VISIT MOD MDM: CPT | Mod: 25

## 2021-01-01 PROCEDURE — 81003 URINALYSIS AUTO W/O SCOPE: CPT

## 2021-01-01 PROCEDURE — 96372 THER/PROPH/DIAG INJ SC/IM: CPT

## 2021-01-01 RX ORDER — AZITHROMYCIN 250 MG/1
1000 TABLET, FILM COATED ORAL
Status: DISCONTINUED | OUTPATIENT
Start: 2021-01-01 | End: 2021-01-01

## 2021-01-01 RX ORDER — CEFTRIAXONE 250 MG/1
250 INJECTION, POWDER, FOR SOLUTION INTRAMUSCULAR; INTRAVENOUS
Status: COMPLETED | OUTPATIENT
Start: 2021-01-01 | End: 2021-01-01

## 2021-01-01 RX ORDER — ONDANSETRON 4 MG/1
4 TABLET, ORALLY DISINTEGRATING ORAL
Status: COMPLETED | OUTPATIENT
Start: 2021-01-01 | End: 2021-01-01

## 2021-01-01 RX ORDER — AZITHROMYCIN 250 MG/1
1000 TABLET, FILM COATED ORAL
Status: COMPLETED | OUTPATIENT
Start: 2021-01-01 | End: 2021-01-01

## 2021-01-01 RX ADMIN — ONDANSETRON 4 MG: 4 TABLET, ORALLY DISINTEGRATING ORAL at 06:01

## 2021-01-01 RX ADMIN — AZITHROMYCIN MONOHYDRATE 1000 MG: 250 TABLET ORAL at 06:01

## 2021-01-01 RX ADMIN — CEFTRIAXONE SODIUM 250 MG: 250 INJECTION, POWDER, FOR SOLUTION INTRAMUSCULAR; INTRAVENOUS at 06:01

## 2021-01-04 ENCOUNTER — PATIENT MESSAGE (OUTPATIENT)
Dept: ADMINISTRATIVE | Facility: HOSPITAL | Age: 36
End: 2021-01-04

## 2021-02-22 ENCOUNTER — TELEPHONE (OUTPATIENT)
Dept: INTERNAL MEDICINE | Facility: CLINIC | Age: 36
End: 2021-02-22

## 2021-02-22 DIAGNOSIS — E78.2 MIXED HYPERLIPIDEMIA: ICD-10-CM

## 2021-02-22 RX ORDER — ATORVASTATIN CALCIUM 40 MG/1
40 TABLET, FILM COATED ORAL DAILY
Qty: 90 TABLET | Refills: 3 | Status: SHIPPED | OUTPATIENT
Start: 2021-02-22 | End: 2022-01-05 | Stop reason: SDUPTHER

## 2021-03-17 ENCOUNTER — TELEPHONE (OUTPATIENT)
Dept: INTERNAL MEDICINE | Facility: CLINIC | Age: 36
End: 2021-03-17

## 2021-04-14 DIAGNOSIS — E13.9 LADA (LATENT AUTOIMMUNE DIABETES IN ADULTS), MANAGED AS TYPE 1: Primary | ICD-10-CM

## 2021-04-14 RX ORDER — DULAGLUTIDE 1.5 MG/.5ML
1.5 INJECTION, SOLUTION SUBCUTANEOUS
Qty: 4 PEN | Refills: 6 | Status: SHIPPED | OUTPATIENT
Start: 2021-04-14 | End: 2022-01-05

## 2021-05-04 ENCOUNTER — PATIENT MESSAGE (OUTPATIENT)
Dept: RESEARCH | Facility: HOSPITAL | Age: 36
End: 2021-05-04

## 2021-05-06 ENCOUNTER — TELEPHONE (OUTPATIENT)
Dept: INTERNAL MEDICINE | Facility: CLINIC | Age: 36
End: 2021-05-06

## 2021-05-10 ENCOUNTER — PATIENT MESSAGE (OUTPATIENT)
Dept: RESEARCH | Facility: HOSPITAL | Age: 36
End: 2021-05-10

## 2021-05-18 ENCOUNTER — TELEPHONE (OUTPATIENT)
Dept: INTERNAL MEDICINE | Facility: CLINIC | Age: 36
End: 2021-05-18

## 2021-05-18 ENCOUNTER — PATIENT MESSAGE (OUTPATIENT)
Dept: INTERNAL MEDICINE | Facility: CLINIC | Age: 36
End: 2021-05-18

## 2021-06-02 ENCOUNTER — PATIENT MESSAGE (OUTPATIENT)
Dept: ADMINISTRATIVE | Facility: HOSPITAL | Age: 36
End: 2021-06-02

## 2021-06-03 ENCOUNTER — PATIENT MESSAGE (OUTPATIENT)
Dept: ADMINISTRATIVE | Facility: HOSPITAL | Age: 36
End: 2021-06-03

## 2021-08-03 ENCOUNTER — PATIENT MESSAGE (OUTPATIENT)
Dept: ADMINISTRATIVE | Facility: HOSPITAL | Age: 36
End: 2021-08-03

## 2021-08-04 ENCOUNTER — PATIENT OUTREACH (OUTPATIENT)
Dept: ADMINISTRATIVE | Facility: OTHER | Age: 36
End: 2021-08-04

## 2021-08-23 ENCOUNTER — IMMUNIZATION (OUTPATIENT)
Dept: OBSTETRICS AND GYNECOLOGY | Facility: CLINIC | Age: 36
End: 2021-08-23
Payer: COMMERCIAL

## 2021-08-23 DIAGNOSIS — Z23 NEED FOR VACCINATION: Primary | ICD-10-CM

## 2021-08-23 PROCEDURE — 0001A COVID-19, MRNA, LNP-S, PF, 30 MCG/0.3 ML DOSE VACCINE: ICD-10-PCS | Mod: CV19,,, | Performed by: FAMILY MEDICINE

## 2021-08-23 PROCEDURE — 91300 COVID-19, MRNA, LNP-S, PF, 30 MCG/0.3 ML DOSE VACCINE: CPT | Mod: ,,, | Performed by: FAMILY MEDICINE

## 2021-08-23 PROCEDURE — 0001A COVID-19, MRNA, LNP-S, PF, 30 MCG/0.3 ML DOSE VACCINE: CPT | Mod: CV19,,, | Performed by: FAMILY MEDICINE

## 2021-08-23 PROCEDURE — 91300 COVID-19, MRNA, LNP-S, PF, 30 MCG/0.3 ML DOSE VACCINE: ICD-10-PCS | Mod: ,,, | Performed by: FAMILY MEDICINE

## 2021-09-27 ENCOUNTER — IMMUNIZATION (OUTPATIENT)
Dept: OBSTETRICS AND GYNECOLOGY | Facility: CLINIC | Age: 36
End: 2021-09-27
Payer: COMMERCIAL

## 2021-09-27 DIAGNOSIS — Z23 NEED FOR VACCINATION: Primary | ICD-10-CM

## 2021-09-27 PROCEDURE — 0002A COVID-19, MRNA, LNP-S, PF, 30 MCG/0.3 ML DOSE VACCINE: CPT | Mod: PBBFAC | Performed by: FAMILY MEDICINE

## 2021-09-27 PROCEDURE — 91300 COVID-19, MRNA, LNP-S, PF, 30 MCG/0.3 ML DOSE VACCINE: CPT | Mod: PBBFAC | Performed by: FAMILY MEDICINE

## 2021-10-04 ENCOUNTER — PATIENT MESSAGE (OUTPATIENT)
Dept: ADMINISTRATIVE | Facility: HOSPITAL | Age: 36
End: 2021-10-04

## 2021-10-04 DIAGNOSIS — E11.9 TYPE 2 DIABETES MELLITUS WITHOUT COMPLICATION, UNSPECIFIED WHETHER LONG TERM INSULIN USE: ICD-10-CM

## 2021-12-29 ENCOUNTER — PATIENT MESSAGE (OUTPATIENT)
Dept: INTERNAL MEDICINE | Facility: CLINIC | Age: 36
End: 2021-12-29
Payer: COMMERCIAL

## 2021-12-31 ENCOUNTER — PATIENT MESSAGE (OUTPATIENT)
Dept: INTERNAL MEDICINE | Facility: CLINIC | Age: 36
End: 2021-12-31
Payer: COMMERCIAL

## 2022-01-03 ENCOUNTER — TELEPHONE (OUTPATIENT)
Dept: INTERNAL MEDICINE | Facility: CLINIC | Age: 37
End: 2022-01-03
Payer: COMMERCIAL

## 2022-01-05 ENCOUNTER — OFFICE VISIT (OUTPATIENT)
Dept: INTERNAL MEDICINE | Facility: CLINIC | Age: 37
End: 2022-01-05
Payer: COMMERCIAL

## 2022-01-05 ENCOUNTER — LAB VISIT (OUTPATIENT)
Dept: LAB | Facility: HOSPITAL | Age: 37
End: 2022-01-05
Attending: FAMILY MEDICINE
Payer: COMMERCIAL

## 2022-01-05 VITALS
SYSTOLIC BLOOD PRESSURE: 114 MMHG | HEIGHT: 67 IN | BODY MASS INDEX: 36.54 KG/M2 | DIASTOLIC BLOOD PRESSURE: 72 MMHG | WEIGHT: 232.81 LBS

## 2022-01-05 DIAGNOSIS — E66.9 TYPE 2 DIABETES MELLITUS WITH OBESITY: ICD-10-CM

## 2022-01-05 DIAGNOSIS — Z13.220 SCREENING CHOLESTEROL LEVEL: ICD-10-CM

## 2022-01-05 DIAGNOSIS — I10 ESSENTIAL HYPERTENSION: ICD-10-CM

## 2022-01-05 DIAGNOSIS — E66.01 SEVERE OBESITY (BMI 35.0-39.9) WITH COMORBIDITY: ICD-10-CM

## 2022-01-05 DIAGNOSIS — Z00.00 ANNUAL PHYSICAL EXAM: ICD-10-CM

## 2022-01-05 DIAGNOSIS — B35.1 ONYCHOMYCOSIS OF MULTIPLE TOENAILS WITH TYPE 2 DIABETES MELLITUS: ICD-10-CM

## 2022-01-05 DIAGNOSIS — Z11.4 ENCOUNTER FOR SCREENING FOR HIV: ICD-10-CM

## 2022-01-05 DIAGNOSIS — Z79.899 ENCOUNTER FOR LONG-TERM (CURRENT) USE OF OTHER MEDICATIONS: ICD-10-CM

## 2022-01-05 DIAGNOSIS — E11.29 TYPE 2 DIABETES MELLITUS WITH MICROALBUMINURIA, WITH LONG-TERM CURRENT USE OF INSULIN: ICD-10-CM

## 2022-01-05 DIAGNOSIS — E10.29 TYPE 1 DIABETES MELLITUS WITH MICROALBUMINURIA: ICD-10-CM

## 2022-01-05 DIAGNOSIS — E11.69 TYPE 2 DIABETES MELLITUS WITH OBESITY: ICD-10-CM

## 2022-01-05 DIAGNOSIS — I15.2 HYPERTENSION ASSOCIATED WITH DIABETES: ICD-10-CM

## 2022-01-05 DIAGNOSIS — Z79.4 TYPE 2 DIABETES MELLITUS WITH MICROALBUMINURIA, WITH LONG-TERM CURRENT USE OF INSULIN: ICD-10-CM

## 2022-01-05 DIAGNOSIS — Z11.59 NEED FOR HEPATITIS C SCREENING TEST: ICD-10-CM

## 2022-01-05 DIAGNOSIS — E11.59 OBESITY, DIABETES, AND HYPERTENSION SYNDROME: ICD-10-CM

## 2022-01-05 DIAGNOSIS — E11.59 HYPERTENSION ASSOCIATED WITH DIABETES: ICD-10-CM

## 2022-01-05 DIAGNOSIS — I15.2 OBESITY, DIABETES, AND HYPERTENSION SYNDROME: ICD-10-CM

## 2022-01-05 DIAGNOSIS — E78.5 HYPERLIPIDEMIA ASSOCIATED WITH TYPE 2 DIABETES MELLITUS: ICD-10-CM

## 2022-01-05 DIAGNOSIS — E13.9 LADA (LATENT AUTOIMMUNE DIABETES IN ADULTS), MANAGED AS TYPE 1: ICD-10-CM

## 2022-01-05 DIAGNOSIS — Z00.00 ANNUAL PHYSICAL EXAM: Primary | ICD-10-CM

## 2022-01-05 DIAGNOSIS — R80.9 TYPE 2 DIABETES MELLITUS WITH MICROALBUMINURIA, WITH LONG-TERM CURRENT USE OF INSULIN: ICD-10-CM

## 2022-01-05 DIAGNOSIS — E11.69 HYPERLIPIDEMIA ASSOCIATED WITH TYPE 2 DIABETES MELLITUS: ICD-10-CM

## 2022-01-05 DIAGNOSIS — E78.2 MIXED HYPERLIPIDEMIA: ICD-10-CM

## 2022-01-05 DIAGNOSIS — Z13.5 ENCOUNTER FOR SCREENING FOR DIABETIC RETINOPATHY: ICD-10-CM

## 2022-01-05 DIAGNOSIS — E66.9 OBESITY, DIABETES, AND HYPERTENSION SYNDROME: ICD-10-CM

## 2022-01-05 DIAGNOSIS — R80.9 TYPE 1 DIABETES MELLITUS WITH MICROALBUMINURIA: ICD-10-CM

## 2022-01-05 DIAGNOSIS — E11.69 ONYCHOMYCOSIS OF MULTIPLE TOENAILS WITH TYPE 2 DIABETES MELLITUS: ICD-10-CM

## 2022-01-05 DIAGNOSIS — E11.69 OBESITY, DIABETES, AND HYPERTENSION SYNDROME: ICD-10-CM

## 2022-01-05 LAB
ALBUMIN SERPL BCP-MCNC: 4.2 G/DL (ref 3.5–5.2)
ALP SERPL-CCNC: 128 U/L (ref 55–135)
ALT SERPL W/O P-5'-P-CCNC: 15 U/L (ref 10–44)
ANION GAP SERPL CALC-SCNC: 11 MMOL/L (ref 8–16)
AST SERPL-CCNC: 17 U/L (ref 10–40)
BILIRUB SERPL-MCNC: 0.4 MG/DL (ref 0.1–1)
BUN SERPL-MCNC: 9 MG/DL (ref 6–20)
CALCIUM SERPL-MCNC: 9.7 MG/DL (ref 8.7–10.5)
CHLORIDE SERPL-SCNC: 98 MMOL/L (ref 95–110)
CHOLEST SERPL-MCNC: 235 MG/DL (ref 120–199)
CHOLEST/HDLC SERPL: 5.5 {RATIO} (ref 2–5)
CO2 SERPL-SCNC: 25 MMOL/L (ref 23–29)
CREAT SERPL-MCNC: 1 MG/DL (ref 0.5–1.4)
ERYTHROCYTE [DISTWIDTH] IN BLOOD BY AUTOMATED COUNT: 13.4 % (ref 11.5–14.5)
EST. GFR  (AFRICAN AMERICAN): >60 ML/MIN/1.73 M^2
EST. GFR  (NON AFRICAN AMERICAN): >60 ML/MIN/1.73 M^2
ESTIMATED AVG GLUCOSE: ABNORMAL MG/DL (ref 68–131)
GLUCOSE SERPL-MCNC: 346 MG/DL (ref 70–110)
HBA1C MFR BLD: >14 % (ref 4–5.6)
HCT VFR BLD AUTO: 42.9 % (ref 40–54)
HDLC SERPL-MCNC: 43 MG/DL (ref 40–75)
HDLC SERPL: 18.3 % (ref 20–50)
HGB BLD-MCNC: 13.9 G/DL (ref 14–18)
LDLC SERPL CALC-MCNC: 145 MG/DL (ref 63–159)
MCH RBC QN AUTO: 26.6 PG (ref 27–31)
MCHC RBC AUTO-ENTMCNC: 32.4 G/DL (ref 32–36)
MCV RBC AUTO: 82 FL (ref 82–98)
NONHDLC SERPL-MCNC: 192 MG/DL
PLATELET # BLD AUTO: 249 K/UL (ref 150–450)
PMV BLD AUTO: 10.7 FL (ref 9.2–12.9)
POTASSIUM SERPL-SCNC: 4.2 MMOL/L (ref 3.5–5.1)
PROT SERPL-MCNC: 7.6 G/DL (ref 6–8.4)
RBC # BLD AUTO: 5.22 M/UL (ref 4.6–6.2)
SODIUM SERPL-SCNC: 134 MMOL/L (ref 136–145)
TRIGL SERPL-MCNC: 235 MG/DL (ref 30–150)
TSH SERPL DL<=0.005 MIU/L-ACNC: 0.97 UIU/ML (ref 0.4–4)
WBC # BLD AUTO: 4.62 K/UL (ref 3.9–12.7)

## 2022-01-05 PROCEDURE — 36415 COLL VENOUS BLD VENIPUNCTURE: CPT | Performed by: FAMILY MEDICINE

## 2022-01-05 PROCEDURE — 3078F DIAST BP <80 MM HG: CPT | Mod: CPTII,S$GLB,, | Performed by: FAMILY MEDICINE

## 2022-01-05 PROCEDURE — 4010F PR ACE/ARB THEARPY RXD/TAKEN: ICD-10-PCS | Mod: CPTII,S$GLB,, | Performed by: FAMILY MEDICINE

## 2022-01-05 PROCEDURE — 3074F SYST BP LT 130 MM HG: CPT | Mod: CPTII,S$GLB,, | Performed by: FAMILY MEDICINE

## 2022-01-05 PROCEDURE — 83036 HEMOGLOBIN GLYCOSYLATED A1C: CPT | Performed by: FAMILY MEDICINE

## 2022-01-05 PROCEDURE — 99395 PR PREVENTIVE VISIT,EST,18-39: ICD-10-PCS | Mod: S$GLB,,, | Performed by: FAMILY MEDICINE

## 2022-01-05 PROCEDURE — 1159F MED LIST DOCD IN RCRD: CPT | Mod: CPTII,S$GLB,, | Performed by: FAMILY MEDICINE

## 2022-01-05 PROCEDURE — 1159F PR MEDICATION LIST DOCUMENTED IN MEDICAL RECORD: ICD-10-PCS | Mod: CPTII,S$GLB,, | Performed by: FAMILY MEDICINE

## 2022-01-05 PROCEDURE — 99999 PR PBB SHADOW E&M-EST. PATIENT-LVL III: CPT | Mod: PBBFAC,,, | Performed by: FAMILY MEDICINE

## 2022-01-05 PROCEDURE — 3008F BODY MASS INDEX DOCD: CPT | Mod: CPTII,S$GLB,, | Performed by: FAMILY MEDICINE

## 2022-01-05 PROCEDURE — 1160F RVW MEDS BY RX/DR IN RCRD: CPT | Mod: CPTII,S$GLB,, | Performed by: FAMILY MEDICINE

## 2022-01-05 PROCEDURE — 1160F PR REVIEW ALL MEDS BY PRESCRIBER/CLIN PHARMACIST DOCUMENTED: ICD-10-PCS | Mod: CPTII,S$GLB,, | Performed by: FAMILY MEDICINE

## 2022-01-05 PROCEDURE — 80061 LIPID PANEL: CPT | Performed by: FAMILY MEDICINE

## 2022-01-05 PROCEDURE — 84443 ASSAY THYROID STIM HORMONE: CPT | Performed by: FAMILY MEDICINE

## 2022-01-05 PROCEDURE — 3078F PR MOST RECENT DIASTOLIC BLOOD PRESSURE < 80 MM HG: ICD-10-PCS | Mod: CPTII,S$GLB,, | Performed by: FAMILY MEDICINE

## 2022-01-05 PROCEDURE — 99395 PREV VISIT EST AGE 18-39: CPT | Mod: S$GLB,,, | Performed by: FAMILY MEDICINE

## 2022-01-05 PROCEDURE — 3074F PR MOST RECENT SYSTOLIC BLOOD PRESSURE < 130 MM HG: ICD-10-PCS | Mod: CPTII,S$GLB,, | Performed by: FAMILY MEDICINE

## 2022-01-05 PROCEDURE — 86803 HEPATITIS C AB TEST: CPT | Performed by: FAMILY MEDICINE

## 2022-01-05 PROCEDURE — 4010F ACE/ARB THERAPY RXD/TAKEN: CPT | Mod: CPTII,S$GLB,, | Performed by: FAMILY MEDICINE

## 2022-01-05 PROCEDURE — 3008F PR BODY MASS INDEX (BMI) DOCUMENTED: ICD-10-PCS | Mod: CPTII,S$GLB,, | Performed by: FAMILY MEDICINE

## 2022-01-05 PROCEDURE — 85027 COMPLETE CBC AUTOMATED: CPT | Performed by: FAMILY MEDICINE

## 2022-01-05 PROCEDURE — 87389 HIV-1 AG W/HIV-1&-2 AB AG IA: CPT | Performed by: FAMILY MEDICINE

## 2022-01-05 PROCEDURE — 80053 COMPREHEN METABOLIC PANEL: CPT | Performed by: FAMILY MEDICINE

## 2022-01-05 PROCEDURE — 99999 PR PBB SHADOW E&M-EST. PATIENT-LVL III: ICD-10-PCS | Mod: PBBFAC,,, | Performed by: FAMILY MEDICINE

## 2022-01-05 RX ORDER — DULAGLUTIDE 0.75 MG/.5ML
0.75 INJECTION, SOLUTION SUBCUTANEOUS
Qty: 4 PEN | Refills: 11 | Status: SHIPPED | OUTPATIENT
Start: 2022-01-05 | End: 2022-03-26 | Stop reason: SDUPTHER

## 2022-01-05 RX ORDER — LISINOPRIL 2.5 MG/1
2.5 TABLET ORAL DAILY
Qty: 90 TABLET | Refills: 3 | Status: SHIPPED | OUTPATIENT
Start: 2022-01-05 | End: 2022-03-26 | Stop reason: SDUPTHER

## 2022-01-05 RX ORDER — ATORVASTATIN CALCIUM 40 MG/1
40 TABLET, FILM COATED ORAL DAILY
Qty: 90 TABLET | Refills: 3 | Status: SHIPPED | OUTPATIENT
Start: 2022-01-05 | End: 2022-03-26 | Stop reason: SDUPTHER

## 2022-01-05 RX ORDER — INSULIN GLARGINE 100 [IU]/ML
30 INJECTION, SOLUTION SUBCUTANEOUS 2 TIMES DAILY
Qty: 18 ML | Refills: 11 | Status: SHIPPED | OUTPATIENT
Start: 2022-01-05 | End: 2022-01-10

## 2022-01-05 NOTE — PROGRESS NOTES
"Subjective:       Patient ID: Fco Zafar III is a 36 y.o. male.    Chief Complaint: Annual Exam    HPI    Fco Zafar III is a 36 y.o. male PMHx DM for checkup.     Lost to follow up over past two yrs s/t job change, pandemic, hurr shea issues    Has not been using long acting insulin recently with insurance change and former med no longer covered. Will try alt. Has not been using trulicity either. Still taking below meds.     #Cards: HLD  - reg: Lisinopril 2.5 qd; Lipitor 40 qd    #Endo: DM (A1c 7/2020 = 9.0)  - est w/ NP Matt, upcoming appt 2/3/22  - reg: Metformin 1000 bid    Review of Systems   Constitutional: Negative for chills, fatigue and fever.   HENT: Negative for congestion.    Respiratory: Negative for cough and shortness of breath.    Cardiovascular: Negative for chest pain and palpitations.   Gastrointestinal: Negative for abdominal pain, constipation, diarrhea, nausea and vomiting.   Genitourinary: Negative for dysuria and hematuria.   Musculoskeletal: Negative for back pain.   Skin: Negative for rash.   Neurological: Negative for weakness, numbness and headaches.         Past Medical History:   Diagnosis Date    Diabetes     DKA (diabetic ketoacidoses)     Hyperlipidemia     Hypertension         Prior to Admission medications    Medication Sig Start Date End Date Taking? Authorizing Provider   atorvastatin (LIPITOR) 40 MG tablet Take 1 tablet (40 mg total) by mouth once daily. 2/22/21  Yes Nirav Lanza MD   BD AMELIA 2ND GEN PEN NEEDLE 32 gauge x 5/32" Ndle USE WITH INSULIN PEN FOUR TIMES DAILY 8/6/21  Yes JOHANA Mancuso FNP   BD ULTRA-FINE MICRO PEN NEEDLE 32 gauge x 1/4" Ndle Uses 4 x a day. 90 day 7/29/20  Yes JOHANA Mancuso FNP   blood sugar diagnostic Strp To check BG 4 times daily, to use with insurance preferred meter 4/28/20  Yes JOHANA Mancuso FNP   lisinopriL (PRINIVIL,ZESTRIL) 2.5 MG tablet TAKE 1 TABLET(2.5 MG) BY MOUTH EVERY DAY 2/2/21  Yes Asmita " "JOHANA Leal FNP   metFORMIN (GLUCOPHAGE) 1000 MG tablet TAKE 1 TABLET(1000 MG) BY MOUTH TWICE DAILY WITH MEALS 8/5/21  Yes JOHANA Mancuso FNP   MICRO THIN LANCETS 33 gauge [facility]  4/28/20  Yes Historical Provider   sildenafil (VIAGRA) 100 MG tablet Take 1 tablet (100 mg total) by mouth daily as needed for Erectile Dysfunction. 5/13/19 5/12/20 Yes Ry Lindquist MD   dulaglutide (TRULICITY) 1.5 mg/0.5 mL pen injector Inject 1.5 mg into the skin every 7 days.  Patient not taking: Reported on 1/5/2022 4/14/21   JOHANA Mancuso FNP   flash glucose scanning reader (FREESTYLE MARGIE 14 DAY READER) Misc Use as directed, margie 2 -newest one  Patient not taking: Reported on 1/5/2022 8/1/20   JOHANA Mancuso FNP   flash glucose sensor (FREESTYLE MARGEI 14 DAY SENSOR) Kit Change every 14 days margie 2-newest one  Patient not taking: Reported on 1/5/2022 8/1/20   JOHANA Mancuso FNP   insulin (LANTUS SOLOSTAR U-100 INSULIN) glargine 100 units/mL (3mL) SubQ pen ADMINISTER 40 UNITS UNDER THE SKIN AT NIGHT  Patient not taking: Reported on 1/5/2022 7/3/21   JOHANA Mancuso FNP   insulin lispro (HUMALOG KWIKPEN INSULIN) 100 unit/mL pen Inject 12 units w/ meals plus scale 180-230+2, 231-280+4, 281-330+6, 331-380+8, >380+10.  Patient not taking: Reported on 1/5/2022 4/28/20   JOHANA Mancuso FNP        Past medical history, surgical history, and family medical history reviewed and updated as appropriate.    Medications and allergies reviewed.     Objective:          Vitals:    01/05/22 1037   BP: 114/72   BP Location: Right arm   Patient Position: Sitting   BP Method: Large (Manual)   Weight: 105.6 kg (232 lb 12.9 oz)   Height: 5' 7.01" (1.702 m)     Body mass index is 36.45 kg/m².  Physical Exam  Vitals and nursing note reviewed.   Constitutional:       General: He is not in acute distress.     Appearance: Normal appearance. He is well-developed.   Eyes:      Extraocular Movements: " Extraocular movements intact.   Cardiovascular:      Rate and Rhythm: Normal rate and regular rhythm.      Pulses: Normal pulses.           Dorsalis pedis pulses are 2+ on the right side and 2+ on the left side.        Posterior tibial pulses are 2+ on the right side and 2+ on the left side.      Heart sounds: Normal heart sounds. No murmur heard.      Pulmonary:      Effort: Pulmonary effort is normal. No respiratory distress.      Breath sounds: Normal breath sounds. No wheezing.   Abdominal:      General: Bowel sounds are normal. There is no distension.      Palpations: Abdomen is soft.      Tenderness: There is no abdominal tenderness.   Feet:      Right foot:      Protective Sensation: 5 sites tested. 5 sites sensed.      Skin integrity: Dry skin present. No ulcer, skin breakdown or erythema.      Toenail Condition: Fungal disease present.     Left foot:      Protective Sensation: 5 sites tested. 5 sites sensed.      Skin integrity: Dry skin present. No ulcer, skin breakdown or erythema.      Toenail Condition: Fungal disease present.  Neurological:      Mental Status: He is alert and oriented to person, place, and time.         Lab Results   Component Value Date    WBC 5.4 06/14/2012    HGB 13.2 (L) 06/14/2012    HCT 39.0 (L) 06/14/2012     06/14/2012    CHOL 149 02/01/2020    TRIG 90 02/01/2020    HDL 40 02/01/2020    ALT 14 02/01/2020    AST 17 02/01/2020     02/01/2020    K 3.8 02/01/2020     02/01/2020    CREATININE 0.9 02/01/2020    BUN 7 02/01/2020    CO2 25 02/01/2020    TSH 0.579 02/01/2020    GLUF 140 (H) 04/28/2018    HGBA1C 9.0 (H) 07/28/2020       Assessment:       1. Annual physical exam    2. Encounter for long-term (current) use of other medications    3. Type 1 diabetes mellitus with microalbuminuria    4. Encounter for screening for diabetic retinopathy    5. Need for hepatitis C screening test    6. Encounter for screening for HIV    7. Screening cholesterol level    8.  Essential hypertension    9. Hyperlipidemia associated with type 2 diabetes mellitus    10. Type 2 diabetes mellitus with microalbuminuria, with long-term current use of insulin    11. Hypertension associated with diabetes    12. Mixed hyperlipidemia    13. 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 Onychomycosis of multiple toenails with type 2 diabetes mellitus    15. Obesity, diabetes, and hypertension syndrome    16. Type 2 diabetes mellitus with obesity    17. Severe obesity (BMI 35.0-39.9) with comorbidity          Plan:     Problem List Items Addressed This Visit        Derm    Onychomycosis of multiple toenails with type 2 diabetes mellitus    Overview     Podiatry referral            Cardiac/Vascular    Essential hypertension    Overview     bp controlled today, cont curr reg         Mixed hyperlipidemia    Hyperlipidemia associated with type 2 diabetes mellitus    Overview     Cont statin         Hypertension associated with diabetes       Endocrine    Type 2 diabetes mellitus with microalbuminuria, with long-term current use of insulin    Overview     Recheck labs  - expecting uncontrolled levels  - restart regimen         Severe obesity (BMI 35.0-39.9) with comorbidity    Overview     Counseled regarding benefits of healthy diet (goal of 5 or more servings of fruits/veggies daily, water as main drink, increased consumption of healthy fats such as nuts, beans, avocados, olive oil instead of unhealthy fat options) and physical activity (150 minutes of moderate-intensity aerobic activity per week) to improve overall health.           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     Follow up in about 6 months (around 7/5/2022) for Checkup.    Nirav Lanza MD  Family Medicine / Primary Care  [facility]  1/5/2022

## 2022-01-06 LAB
HCV AB SERPL QL IA: NEGATIVE
HIV 1+2 AB+HIV1 P24 AG SERPL QL IA: NEGATIVE

## 2022-01-10 ENCOUNTER — PATIENT MESSAGE (OUTPATIENT)
Dept: INTERNAL MEDICINE | Facility: CLINIC | Age: 37
End: 2022-01-10
Payer: COMMERCIAL

## 2022-01-10 DIAGNOSIS — R80.9 TYPE 1 DIABETES MELLITUS WITH MICROALBUMINURIA: Primary | ICD-10-CM

## 2022-01-10 DIAGNOSIS — E10.29 TYPE 1 DIABETES MELLITUS WITH MICROALBUMINURIA: Primary | ICD-10-CM

## 2022-01-10 RX ORDER — INSULIN DEGLUDEC 100 U/ML
30 INJECTION, SOLUTION SUBCUTANEOUS 2 TIMES DAILY
Qty: 6 PEN | Refills: 11 | Status: SHIPPED | OUTPATIENT
Start: 2022-01-10 | End: 2022-03-28 | Stop reason: SDUPTHER

## 2022-01-10 NOTE — TELEPHONE ENCOUNTER
Reviewed with NP Gutierrez. We will send in Tresiba. Per [facility] this on the preferred formulary.

## 2022-01-10 NOTE — TELEPHONE ENCOUNTER
Spoke to pt to clarify his msg.   Pt unsure about the insulin  Dr. Lanza send insulin glargine on 1/5  Nurse, can you reach out to pharmacy to see if they are able to fill the insulin glargine (generic for lantus)  If not, can they provide you with the alternatives are covered by pt insurance  Thanks.

## 2022-01-26 ENCOUNTER — PATIENT MESSAGE (OUTPATIENT)
Dept: ADMINISTRATIVE | Facility: HOSPITAL | Age: 37
End: 2022-01-26
Payer: COMMERCIAL

## 2022-03-16 ENCOUNTER — PATIENT MESSAGE (OUTPATIENT)
Dept: ADMINISTRATIVE | Facility: HOSPITAL | Age: 37
End: 2022-03-16
Payer: COMMERCIAL

## 2022-06-13 ENCOUNTER — TELEPHONE (OUTPATIENT)
Dept: INTERNAL MEDICINE | Facility: CLINIC | Age: 37
End: 2022-06-13

## 2022-06-13 ENCOUNTER — OFFICE VISIT (OUTPATIENT)
Dept: INTERNAL MEDICINE | Facility: CLINIC | Age: 37
End: 2022-06-13
Payer: COMMERCIAL

## 2022-06-13 ENCOUNTER — LAB VISIT (OUTPATIENT)
Dept: LAB | Facility: HOSPITAL | Age: 37
End: 2022-06-13
Payer: COMMERCIAL

## 2022-06-13 VITALS
DIASTOLIC BLOOD PRESSURE: 80 MMHG | SYSTOLIC BLOOD PRESSURE: 132 MMHG | OXYGEN SATURATION: 97 % | HEIGHT: 67 IN | HEART RATE: 101 BPM | BODY MASS INDEX: 35.33 KG/M2 | WEIGHT: 225.06 LBS

## 2022-06-13 DIAGNOSIS — E11.69 HYPERLIPIDEMIA ASSOCIATED WITH TYPE 2 DIABETES MELLITUS: ICD-10-CM

## 2022-06-13 DIAGNOSIS — Z79.4 TYPE 2 DIABETES MELLITUS WITH MICROALBUMINURIA, WITH LONG-TERM CURRENT USE OF INSULIN: Primary | ICD-10-CM

## 2022-06-13 DIAGNOSIS — E10.29 TYPE 1 DIABETES MELLITUS WITH MICROALBUMINURIA: ICD-10-CM

## 2022-06-13 DIAGNOSIS — E78.5 HYPERLIPIDEMIA ASSOCIATED WITH TYPE 2 DIABETES MELLITUS: ICD-10-CM

## 2022-06-13 DIAGNOSIS — I10 ESSENTIAL HYPERTENSION: ICD-10-CM

## 2022-06-13 DIAGNOSIS — E11.69 ERECTILE DYSFUNCTION ASSOCIATED WITH TYPE 2 DIABETES MELLITUS: ICD-10-CM

## 2022-06-13 DIAGNOSIS — E11.29 TYPE 2 DIABETES MELLITUS WITH MICROALBUMINURIA, WITH LONG-TERM CURRENT USE OF INSULIN: Primary | ICD-10-CM

## 2022-06-13 DIAGNOSIS — R80.9 TYPE 2 DIABETES MELLITUS WITH MICROALBUMINURIA, WITH LONG-TERM CURRENT USE OF INSULIN: Primary | ICD-10-CM

## 2022-06-13 DIAGNOSIS — N52.1 ERECTILE DYSFUNCTION ASSOCIATED WITH TYPE 2 DIABETES MELLITUS: ICD-10-CM

## 2022-06-13 DIAGNOSIS — Z79.899 ENCOUNTER FOR LONG-TERM (CURRENT) USE OF OTHER MEDICATIONS: ICD-10-CM

## 2022-06-13 DIAGNOSIS — E66.01 SEVERE OBESITY (BMI 35.0-39.9) WITH COMORBIDITY: ICD-10-CM

## 2022-06-13 DIAGNOSIS — R80.9 TYPE 1 DIABETES MELLITUS WITH MICROALBUMINURIA: ICD-10-CM

## 2022-06-13 DIAGNOSIS — Z13.5 ENCOUNTER FOR SCREENING FOR DIABETIC RETINOPATHY: ICD-10-CM

## 2022-06-13 LAB
ESTIMATED AVG GLUCOSE: ABNORMAL MG/DL (ref 68–131)
HBA1C MFR BLD: >14 % (ref 4–5.6)

## 2022-06-13 PROCEDURE — 3075F SYST BP GE 130 - 139MM HG: CPT | Mod: CPTII,S$GLB,, | Performed by: FAMILY MEDICINE

## 2022-06-13 PROCEDURE — 99214 OFFICE O/P EST MOD 30 MIN: CPT | Mod: S$GLB,,, | Performed by: FAMILY MEDICINE

## 2022-06-13 PROCEDURE — 99999 PR PBB SHADOW E&M-EST. PATIENT-LVL V: CPT | Mod: PBBFAC,,, | Performed by: FAMILY MEDICINE

## 2022-06-13 PROCEDURE — 3079F DIAST BP 80-89 MM HG: CPT | Mod: CPTII,S$GLB,, | Performed by: FAMILY MEDICINE

## 2022-06-13 PROCEDURE — 99999 PR PBB SHADOW E&M-EST. PATIENT-LVL V: ICD-10-PCS | Mod: PBBFAC,,, | Performed by: FAMILY MEDICINE

## 2022-06-13 PROCEDURE — 4010F PR ACE/ARB THEARPY RXD/TAKEN: ICD-10-PCS | Mod: CPTII,S$GLB,, | Performed by: FAMILY MEDICINE

## 2022-06-13 PROCEDURE — 4010F ACE/ARB THERAPY RXD/TAKEN: CPT | Mod: CPTII,S$GLB,, | Performed by: FAMILY MEDICINE

## 2022-06-13 PROCEDURE — 83036 HEMOGLOBIN GLYCOSYLATED A1C: CPT | Performed by: NURSE PRACTITIONER

## 2022-06-13 PROCEDURE — 3060F PR POS MICROALBUMINURIA RESULT DOCUMENTED/REVIEW: ICD-10-PCS | Mod: CPTII,S$GLB,, | Performed by: FAMILY MEDICINE

## 2022-06-13 PROCEDURE — 3060F POS MICROALBUMINURIA REV: CPT | Mod: CPTII,S$GLB,, | Performed by: FAMILY MEDICINE

## 2022-06-13 PROCEDURE — 3075F PR MOST RECENT SYSTOLIC BLOOD PRESS GE 130-139MM HG: ICD-10-PCS | Mod: CPTII,S$GLB,, | Performed by: FAMILY MEDICINE

## 2022-06-13 PROCEDURE — 1159F PR MEDICATION LIST DOCUMENTED IN MEDICAL RECORD: ICD-10-PCS | Mod: CPTII,S$GLB,, | Performed by: FAMILY MEDICINE

## 2022-06-13 PROCEDURE — 1159F MED LIST DOCD IN RCRD: CPT | Mod: CPTII,S$GLB,, | Performed by: FAMILY MEDICINE

## 2022-06-13 PROCEDURE — 3046F PR MOST RECENT HEMOGLOBIN A1C LEVEL > 9.0%: ICD-10-PCS | Mod: CPTII,S$GLB,, | Performed by: FAMILY MEDICINE

## 2022-06-13 PROCEDURE — 3046F HEMOGLOBIN A1C LEVEL >9.0%: CPT | Mod: CPTII,S$GLB,, | Performed by: FAMILY MEDICINE

## 2022-06-13 PROCEDURE — 99214 PR OFFICE/OUTPT VISIT, EST, LEVL IV, 30-39 MIN: ICD-10-PCS | Mod: S$GLB,,, | Performed by: FAMILY MEDICINE

## 2022-06-13 PROCEDURE — 3066F NEPHROPATHY DOC TX: CPT | Mod: CPTII,S$GLB,, | Performed by: FAMILY MEDICINE

## 2022-06-13 PROCEDURE — 3079F PR MOST RECENT DIASTOLIC BLOOD PRESSURE 80-89 MM HG: ICD-10-PCS | Mod: CPTII,S$GLB,, | Performed by: FAMILY MEDICINE

## 2022-06-13 PROCEDURE — 3008F BODY MASS INDEX DOCD: CPT | Mod: CPTII,S$GLB,, | Performed by: FAMILY MEDICINE

## 2022-06-13 PROCEDURE — 3008F PR BODY MASS INDEX (BMI) DOCUMENTED: ICD-10-PCS | Mod: CPTII,S$GLB,, | Performed by: FAMILY MEDICINE

## 2022-06-13 PROCEDURE — 3066F PR DOCUMENTATION OF TREATMENT FOR NEPHROPATHY: ICD-10-PCS | Mod: CPTII,S$GLB,, | Performed by: FAMILY MEDICINE

## 2022-06-13 PROCEDURE — 36415 COLL VENOUS BLD VENIPUNCTURE: CPT | Performed by: NURSE PRACTITIONER

## 2022-06-13 RX ORDER — SILDENAFIL 100 MG/1
100 TABLET, FILM COATED ORAL DAILY PRN
Qty: 10 TABLET | Refills: 5 | Status: SHIPPED | OUTPATIENT
Start: 2022-06-13 | End: 2023-03-27

## 2022-06-13 RX ORDER — DULAGLUTIDE 1.5 MG/.5ML
1.5 INJECTION, SOLUTION SUBCUTANEOUS
Qty: 4 PEN | Refills: 11 | Status: SHIPPED | OUTPATIENT
Start: 2022-06-13 | End: 2023-01-09 | Stop reason: SDUPTHER

## 2022-06-13 RX ORDER — METFORMIN HYDROCHLORIDE 500 MG/1
500 TABLET, EXTENDED RELEASE ORAL
Qty: 90 TABLET | Refills: 3 | Status: SHIPPED | OUTPATIENT
Start: 2022-06-13 | End: 2022-11-30 | Stop reason: SDUPTHER

## 2022-06-13 NOTE — PROGRESS NOTES
Subjective:       Patient ID: Fco Zafar III is a 36 y.o. male.    Chief Complaint: Annual Exam    Diabetes  He has type 1 diabetes mellitus. No MedicAlert identification noted. The initial diagnosis of diabetes was made 10 years ago. Hypoglycemia symptoms include mood changes. Pertinent negatives for hypoglycemia include no confusion, dizziness, headaches, hunger, nervousness/anxiousness, pallor, seizures, sleepiness, speech difficulty, sweats or tremors. Associated symptoms include polyuria. Pertinent negatives for diabetes include no blurred vision, no chest pain, no fatigue, no foot paresthesias, no foot ulcerations, no polydipsia, no polyphagia, no visual change, no weakness and no weight loss. Pertinent negatives for hypoglycemia complications include no blackouts, no hospitalization, no nocturnal hypoglycemia, no required assistance and no required glucagon injection. Symptoms are stable. Pertinent negatives for diabetic complications include no autonomic neuropathy, CVA, heart disease, impotence, nephropathy, peripheral neuropathy, PVD or retinopathy. Risk factors for coronary artery disease include hypertension, stress, diabetes mellitus and male sex. Current diabetic treatment includes insulin injections and oral agent (dual therapy). He is compliant with treatment none of the time. He is currently taking insulin pre-breakfast and pre-dinner. Insulin injections are given by patient. Rotation sites for injection include the abdominal wall. His weight is fluctuating minimally. He is following a high fat/cholesterol diet. When asked about meal planning, he reported none. He has not had a previous visit with a dietitian. He rarely participates in exercise. He monitors blood glucose at home 1-2 x per week. He monitors urine at home <1 x per month. Blood glucose monitoring compliance is adequate. His home blood glucose trend is decreasing steadily. He does not see a podiatrist.Eye exam is not current.  "      Fco Zafar III is a 36 y.o. male PMHx DM for checkup.     Compliant with prescribed regimen at current time. Pt does not check bg at home. Wants to get restarted with freestyle sensor.      #Cards: HLD  - reg: Lisinopril 2.5 qd; Lipitor 40 qd     #Endo: DM (A1c 1/2022 >14) w/ microalb  - est w/ NP Matt, due for checkup  - reg: Insulin (35U bid), Trulicity 0.75 qwk  - adverse effects w/ metformin IR 1000 (GI effects)  - eye exam due  - foot exam 1/2022 -- request podiatry referral  - urine 1/2022, microalb    #BMI 35    Review of Systems   Constitutional: Negative for fatigue and weight loss.   Eyes: Negative for blurred vision.   Cardiovascular: Negative for chest pain.   Endocrine: Positive for polyuria. Negative for polydipsia and polyphagia.   Genitourinary: Negative for impotence.   Skin: Negative for pallor.   Neurological: Negative for dizziness, tremors, seizures, speech difficulty, weakness and headaches.   Psychiatric/Behavioral: Negative for confusion. The patient is not nervous/anxious.          Past Medical History:   Diagnosis Date    Diabetes     DKA (diabetic ketoacidoses)     Hyperlipidemia     Hypertension         Prior to Admission medications    Medication Sig Start Date End Date Taking? Authorizing Provider   atorvastatin (LIPITOR) 40 MG tablet Take 1 tablet (40 mg total) by mouth once daily. 3/28/22   Nirav Lanza MD   BD AMELIA 2ND GEN PEN NEEDLE 32 gauge x 5/32" Ndle USE WITH INSULIN PEN FOUR TIMES DAILY 8/6/21   JOHANA Mancuso FNP   BD ULTRA-FINE MICRO PEN NEEDLE 32 gauge x 1/4" Ndle Uses 4 x a day. 90 day 7/29/20   JOHANA Mancuso, ROSIE   blood sugar diagnostic Strp To check BG 4 times daily, to use with insurance preferred meter 4/28/20   JOHANA Mancuso FNP   dulaglutide (TRULICITY) 0.75 mg/0.5 mL pen injector Inject 0.75 mg into the skin every 7 days. 3/28/22 3/28/23  Nirav Lanza MD   flash glucose scanning reader (FREESTYLE MARGIE 14 DAY " "READER) Misc Use as directed, adalberto 2 -newest one  Patient not taking: Reported on 1/5/2022 8/1/20   JOHANA Mancuso FNP   flash glucose sensor (FREESTYLE ADALBERTO 14 DAY SENSOR) Kit Change every 14 days adalberto 2-newest one  Patient not taking: Reported on 1/5/2022 8/1/20   JOHANA Mancuso FNP   insulin degludec (TRESIBA FLEXTOUCH U-100) 100 unit/mL (3 mL) insulin pen Inject 30 Units into the skin 2 (two) times a day. 3/28/22 3/28/23  Nirav Lanza MD   insulin lispro (HUMALOG KWIKPEN INSULIN) 100 unit/mL pen Inject 12 units w/ meals plus scale 180-230+2, 231-280+4, 281-330+6, 331-380+8, >380+10.  Patient not taking: Reported on 1/5/2022 4/28/20   JOHANA Mancuso FNP   lisinopriL (PRINIVIL,ZESTRIL) 2.5 MG tablet Take 1 tablet (2.5 mg total) by mouth once daily. 3/28/22   Nirav Lanza MD   metFORMIN (GLUCOPHAGE) 1000 MG tablet TAKE 1 TABLET(1000 MG) BY MOUTH TWICE DAILY WITH MEALS 8/5/21   JOHANA Mancuso FNP   MICRO THIN LANCETS [facility]  4/28/20   Historical Provider   sildenafil (VIAGRA) 100 MG tablet Take 1 tablet (100 mg total) by mouth daily as needed for Erectile Dysfunction. 5/13/19 5/12/20  Ry Lindquist MD   insulin glargine (LANTUS U-100 INSULIN) 100 unit/mL injection Inject 30 Units into the skin 2 (two) times a day. 1/5/22 1/10/22  Nirav Lanza MD        Past medical history, surgical history, and family medical history reviewed and updated as appropriate.    Medications and allergies reviewed.     Objective:          Vitals:    06/13/22 1528   BP: 132/80   BP Location: Left arm   Patient Position: Sitting   BP Method: Medium (Manual)   Pulse: 101   SpO2: 97%   Weight: 102.1 kg (225 lb 1.4 oz)   Height: 5' 7" (1.702 m)     Body mass index is 35.25 kg/m².  Physical Exam  Vitals and nursing note reviewed.   Constitutional:       General: He is not in acute distress.     Appearance: He is not ill-appearing, toxic-appearing or diaphoretic.   Pulmonary:      " Effort: Pulmonary effort is normal. No respiratory distress.   Neurological:      Mental Status: He is alert and oriented to person, place, and time.   Psychiatric:         Mood and Affect: Mood normal.         Behavior: Behavior normal.         Lab Results   Component Value Date    WBC 4.62 01/05/2022    HGB 13.9 (L) 01/05/2022    HCT 42.9 01/05/2022     01/05/2022    CHOL 235 (H) 01/05/2022    TRIG 235 (H) 01/05/2022    HDL 43 01/05/2022    ALT 15 01/05/2022    AST 17 01/05/2022     (L) 01/05/2022    K 4.2 01/05/2022    CL 98 01/05/2022    CREATININE 1.0 01/05/2022    BUN 9 01/05/2022    CO2 25 01/05/2022    TSH 0.966 01/05/2022    GLUF 140 (H) 04/28/2018    HGBA1C >14.0 (H) 01/05/2022       Assessment:       1. Type 2 diabetes mellitus with microalbuminuria, with long-term current use of insulin    2. Hyperlipidemia associated with type 2 diabetes mellitus    3. Essential hypertension    4. Encounter for long-term (current) use of other medications    5. Severe obesity (BMI 35.0-39.9) with comorbidity    6. Encounter for screening for diabetic retinopathy    7. Erectile dysfunction associated with type 2 diabetes mellitus          Plan:   1. Type 2 diabetes mellitus with microalbuminuria, with long-term current use of insulin  Overview:  Recheck labs  - start metformin XR  - cont trulicity, titrate dose  - adverse GI effects w/ metformin IR    Orders:  -     dulaglutide (TRULICITY) 1.5 mg/0.5 mL pen injector  -     Ambulatory referral/consult to Podiatry    2. Hyperlipidemia associated with type 2 diabetes mellitus  Overview:  Cont statin      3. Essential hypertension  Overview:  bp controlled today, cont curr reg.      4. Encounter for long-term (current) use of other medications    5. Severe obesity (BMI 35.0-39.9) with comorbidity  Overview:  Counseled regarding benefits of healthy diet (goal of 5 or more servings of fruits/veggies daily, water as main drink, increased consumption of healthy fats  such as nuts, beans, avocados, olive oil instead of unhealthy fat options) and physical activity (150 minutes of moderate-intensity aerobic activity per week) to improve overall health.        6. Encounter for screening for diabetic retinopathy  -     Diabetic Eye Screening Photo    7. Erectile dysfunction associated with type 2 diabetes mellitus  -     sildenafiL (VIAGRA) 100 MG tablet    Other orders  -     metFORMIN (GLUCOPHAGE-XR) 500 MG ER 24hr tablet      Health maintenance reviewed with patient.     Follow up in about 4 months (around 10/13/2022).    Nirav Lanza MD  Family Medicine / Primary Care  [facility]  6/13/2022

## 2022-06-13 NOTE — TELEPHONE ENCOUNTER
----- Message from Safai Carrion sent at 6/13/2022  4:30 PM CDT -----  Regarding: Lab referrals  Pt has an appointment with Asmita Gutierrez NP on 1/9/23. Pt needs lab orders for 3 month labs and 6 month labs.       Thanks

## 2022-06-14 ENCOUNTER — TELEPHONE (OUTPATIENT)
Dept: INTERNAL MEDICINE | Facility: CLINIC | Age: 37
End: 2022-06-14
Payer: COMMERCIAL

## 2022-06-14 ENCOUNTER — PATIENT MESSAGE (OUTPATIENT)
Dept: INTERNAL MEDICINE | Facility: CLINIC | Age: 37
End: 2022-06-14
Payer: COMMERCIAL

## 2022-06-14 DIAGNOSIS — R80.9 TYPE 1 DIABETES MELLITUS WITH MICROALBUMINURIA: ICD-10-CM

## 2022-06-14 DIAGNOSIS — E10.29 TYPE 1 DIABETES MELLITUS WITH MICROALBUMINURIA: ICD-10-CM

## 2022-06-14 RX ORDER — INSULIN DEGLUDEC 100 U/ML
INJECTION, SOLUTION SUBCUTANEOUS
Qty: 6 PEN | Refills: 8
Start: 2022-06-14 | End: 2023-01-09 | Stop reason: SDUPTHER

## 2022-06-14 RX ORDER — BLOOD-GLUCOSE,RECEIVER,CONT
EACH MISCELLANEOUS
Qty: 1 EACH | Refills: 1 | Status: SHIPPED | OUTPATIENT
Start: 2022-06-14 | End: 2023-01-09

## 2022-06-14 RX ORDER — BLOOD-GLUCOSE TRANSMITTER
EACH MISCELLANEOUS
Qty: 1 EACH | Refills: 3 | Status: SHIPPED | OUTPATIENT
Start: 2022-06-14 | End: 2023-01-09

## 2022-06-14 RX ORDER — BLOOD-GLUCOSE SENSOR
EACH MISCELLANEOUS
Qty: 3 EACH | Refills: 11 | Status: SHIPPED | OUTPATIENT
Start: 2022-06-14 | End: 2023-01-09

## 2022-06-14 RX ORDER — INSULIN ASPART 100 [IU]/ML
INJECTION, SOLUTION INTRAVENOUS; SUBCUTANEOUS
Qty: 30 ML | Refills: 6 | Status: SHIPPED | OUTPATIENT
Start: 2022-06-14 | End: 2023-01-09

## 2022-06-14 NOTE — TELEPHONE ENCOUNTER
----- Message from JOHANA Mancuso, JOVANYP sent at 6/14/2022  8:01 AM CDT -----  Regarding: insulins, high a1c  A1c too high  How much tresiba and meal insulin is he administering?  A1c > 14%  Is he on trulicity?  Check with pt to see if he will like a dexcom  Thanks  Asmita  Better coverage this year

## 2022-06-14 NOTE — TELEPHONE ENCOUNTER
Spoke to patient and he states he take 35 units twice a day, and he stopped taking the metformin because it started messing with his stomach but he received a new prescription of metformin from Dr. Lanza yesterday and he stated he will start it.  He said he is interested in the Dexcom.

## 2022-06-15 ENCOUNTER — TELEPHONE (OUTPATIENT)
Dept: INTERNAL MEDICINE | Facility: CLINIC | Age: 37
End: 2022-06-15
Payer: COMMERCIAL

## 2022-06-15 NOTE — TELEPHONE ENCOUNTER
----- Message from JOHANA Mancuso, ROSIE sent at 6/14/2022  8:55 AM CDT -----  Regarding: pa  Hi!  A1c >14%  Pa for trulicity and dexcom sensors, transmitter,   Keep me posted  Asmita Erickson, injections 4x a day, testing 4x a day   On trulicity and metformin as well

## 2022-06-15 NOTE — TELEPHONE ENCOUNTER
Spoke with staff at [facility] regarding Tulicity and  Dexcom G6 Bundle PA, staff reports patient picked up Trulicity already, reports PA needed for Dexcom G6    PA initiated via [facility] for Dexcom G6 bundle, staff unable to locate patient in their records  Contacted [employer], staff reports that patient has [employer] [place]  [employer] [place] staff reports that pharmacy benefits and PA are completed via [facility] Benefits Dept    Received recorded message from [facility] [employer] that PA forms must be downloaded from   [employer] are only accepted via fax     Form completed and faxed for Dexcom G6 bundle

## 2022-06-27 ENCOUNTER — OFFICE VISIT (OUTPATIENT)
Dept: PODIATRY | Facility: CLINIC | Age: 37
End: 2022-06-27
Payer: COMMERCIAL

## 2022-06-27 VITALS — BODY MASS INDEX: 35.31 KG/M2 | HEIGHT: 67 IN | WEIGHT: 225 LBS

## 2022-06-27 DIAGNOSIS — B35.3 TINEA PEDIS OF BOTH FEET: ICD-10-CM

## 2022-06-27 DIAGNOSIS — M20.11 VALGUS DEFORMITY OF BOTH GREAT TOES: ICD-10-CM

## 2022-06-27 DIAGNOSIS — B35.1 ONYCHOMYCOSIS DUE TO DERMATOPHYTE: ICD-10-CM

## 2022-06-27 DIAGNOSIS — M20.12 VALGUS DEFORMITY OF BOTH GREAT TOES: ICD-10-CM

## 2022-06-27 DIAGNOSIS — Z79.4 TYPE 2 DIABETES MELLITUS WITH MICROALBUMINURIA, WITH LONG-TERM CURRENT USE OF INSULIN: ICD-10-CM

## 2022-06-27 DIAGNOSIS — E11.29 TYPE 2 DIABETES MELLITUS WITH MICROALBUMINURIA, WITH LONG-TERM CURRENT USE OF INSULIN: ICD-10-CM

## 2022-06-27 DIAGNOSIS — E11.42 TYPE 2 DIABETES MELLITUS WITH DIABETIC POLYNEUROPATHY, UNSPECIFIED WHETHER LONG TERM INSULIN USE: Primary | ICD-10-CM

## 2022-06-27 DIAGNOSIS — R80.9 TYPE 2 DIABETES MELLITUS WITH MICROALBUMINURIA, WITH LONG-TERM CURRENT USE OF INSULIN: ICD-10-CM

## 2022-06-27 PROCEDURE — 3060F PR POS MICROALBUMINURIA RESULT DOCUMENTED/REVIEW: ICD-10-PCS | Mod: CPTII,S$GLB,, | Performed by: PODIATRIST

## 2022-06-27 PROCEDURE — 99204 OFFICE O/P NEW MOD 45 MIN: CPT | Mod: S$GLB,,, | Performed by: PODIATRIST

## 2022-06-27 PROCEDURE — 1159F PR MEDICATION LIST DOCUMENTED IN MEDICAL RECORD: ICD-10-PCS | Mod: CPTII,S$GLB,, | Performed by: PODIATRIST

## 2022-06-27 PROCEDURE — 3008F BODY MASS INDEX DOCD: CPT | Mod: CPTII,S$GLB,, | Performed by: PODIATRIST

## 2022-06-27 PROCEDURE — 4010F ACE/ARB THERAPY RXD/TAKEN: CPT | Mod: CPTII,S$GLB,, | Performed by: PODIATRIST

## 2022-06-27 PROCEDURE — 3066F PR DOCUMENTATION OF TREATMENT FOR NEPHROPATHY: ICD-10-PCS | Mod: CPTII,S$GLB,, | Performed by: PODIATRIST

## 2022-06-27 PROCEDURE — 4010F PR ACE/ARB THEARPY RXD/TAKEN: ICD-10-PCS | Mod: CPTII,S$GLB,, | Performed by: PODIATRIST

## 2022-06-27 PROCEDURE — 3060F POS MICROALBUMINURIA REV: CPT | Mod: CPTII,S$GLB,, | Performed by: PODIATRIST

## 2022-06-27 PROCEDURE — 3046F PR MOST RECENT HEMOGLOBIN A1C LEVEL > 9.0%: ICD-10-PCS | Mod: CPTII,S$GLB,, | Performed by: PODIATRIST

## 2022-06-27 PROCEDURE — 3008F PR BODY MASS INDEX (BMI) DOCUMENTED: ICD-10-PCS | Mod: CPTII,S$GLB,, | Performed by: PODIATRIST

## 2022-06-27 PROCEDURE — 99999 PR PBB SHADOW E&M-EST. PATIENT-LVL III: CPT | Mod: PBBFAC,,, | Performed by: PODIATRIST

## 2022-06-27 PROCEDURE — 3066F NEPHROPATHY DOC TX: CPT | Mod: CPTII,S$GLB,, | Performed by: PODIATRIST

## 2022-06-27 PROCEDURE — 99204 PR OFFICE/OUTPT VISIT, NEW, LEVL IV, 45-59 MIN: ICD-10-PCS | Mod: S$GLB,,, | Performed by: PODIATRIST

## 2022-06-27 PROCEDURE — 3046F HEMOGLOBIN A1C LEVEL >9.0%: CPT | Mod: CPTII,S$GLB,, | Performed by: PODIATRIST

## 2022-06-27 PROCEDURE — 1159F MED LIST DOCD IN RCRD: CPT | Mod: CPTII,S$GLB,, | Performed by: PODIATRIST

## 2022-06-27 PROCEDURE — 99999 PR PBB SHADOW E&M-EST. PATIENT-LVL III: ICD-10-PCS | Mod: PBBFAC,,, | Performed by: PODIATRIST

## 2022-06-27 RX ORDER — CICLOPIROX 80 MG/ML
SOLUTION TOPICAL NIGHTLY
Qty: 6.6 ML | Refills: 11 | Status: SHIPPED | OUTPATIENT
Start: 2022-06-27 | End: 2023-07-17

## 2022-06-27 RX ORDER — CICLOPIROX OLAMINE 7.7 MG/G
CREAM TOPICAL 2 TIMES DAILY
Qty: 90 G | Refills: 2 | Status: SHIPPED | OUTPATIENT
Start: 2022-06-27 | End: 2023-04-03 | Stop reason: SDUPTHER

## 2022-06-27 NOTE — PROGRESS NOTES
Subjective:      Patient ID: Fco Zafar III is a 36 y.o. male.    Chief Complaint: Diabetes Mellitus (PCP  06/13/2022) and Diabetic Foot Exam    Diabetes, increased risk amputation needing evaluation/management/optomization of foot care.     Cc thick long discolored toenails, thick dry skin itching both feet.  Both conditions Gradual onset, worsening over past several weeks, aggravated by increased weight bearing, shoe gear, pressure.  No previous medical treatment.  OTC  med not helping.     Chief Complaint   Patient presents with    Diabetes Mellitus     PCP  06/13/2022    Diabetic Foot Exam       Casual shoes bilateral    Review of Systems   Constitutional: Negative for chills, diaphoresis, fever, malaise/fatigue and night sweats.   Cardiovascular: Negative for claudication, cyanosis, leg swelling and syncope.   Skin: Positive for dry skin, itching and nail changes. Negative for color change, rash, suspicious lesions and unusual hair distribution.   Musculoskeletal: Negative for falls, joint pain, joint swelling, muscle cramps, muscle weakness and stiffness.   Gastrointestinal: Negative for constipation, diarrhea, nausea and vomiting.   Neurological: Positive for sensory change. Negative for brief paralysis, disturbances in coordination, focal weakness, numbness, paresthesias and tremors.           Objective:      Physical Exam  Constitutional:       General: He is not in acute distress.     Appearance: He is well-developed. He is not diaphoretic.   Cardiovascular:      Pulses:           Popliteal pulses are 2+ on the right side and 2+ on the left side.        Dorsalis pedis pulses are 2+ on the right side and 2+ on the left side.        Posterior tibial pulses are 2+ on the right side and 2+ on the left side.      Comments: Capillary refill 3 seconds all toes/distal feet, all toes/both feet warm to touch.      Negative lymphadenopathy bilateral popliteal fossa and tarsal tunnel.      Negavie  lower extremity edema bilateral.    Musculoskeletal:      Right ankle: No swelling, deformity, ecchymosis or lacerations. Normal range of motion. Normal pulse.      Right Achilles Tendon: Normal. No defects. Rush's test negative.      Comments: Visible and palpable bunion without pain at dorsomedial 1st metatarsal head left andright.  Hallux abducted left and right partially reducible, tracks laterally without being track bound.  No ecchymosis, erythema, edema, or cardinal signs infection or signs of trauma same foot.    Otherwise, Normal angle, base, station of gait. All ten toes without clubbing, cyanosis, or signs of ischemia.  No pain to palpation bilateral lower extremities.  Range of motion, stability, muscle strength, and muscle tone normal bilateral feet and legs.    Lymphadenopathy:      Lower Body: No right inguinal adenopathy. No left inguinal adenopathy.      Comments: Negative lymphadenopathy bilateral popliteal fossa and tarsal tunnel.    Negative lymphangitic streaking bilateral feet/ankles/legs.   Skin:     General: Skin is warm and dry.      Capillary Refill: Capillary refill takes 2 to 3 seconds.      Coloration: Skin is not pale.      Findings: No abrasion, bruising, burn, ecchymosis, erythema, laceration, lesion or rash.      Nails: There is no clubbing.      Comments: Dry scale with superficial flakes over an erythematous base bottom of both feet without ulceration, drainage, pus, tracking, fluctuance, malodor, or cardinal signs infection.    Toenails 1st, 2nd, 3rd, 4th, 5th  bilateral are hypertrophic thickened 2-3 mm, dystrophic, discolored tanish brown with tan, gray crumbly subungual debris.  Tender to distal nail plate pressure, without periungual skin abnormality of each.       Neurological:      Mental Status: He is alert and oriented to person, place, and time.      Sensory: Sensory deficit present.      Motor: No tremor, atrophy or abnormal muscle tone.      Gait: Gait normal.       Deep Tendon Reflexes:      Reflex Scores:       Patellar reflexes are 2+ on the right side and 2+ on the left side.       Achilles reflexes are 2+ on the right side and 2+ on the left side.     Comments: Decreased/absent vibratory sensation bilateral feet to 128Hz tuning fork.    Otherwise, Negative tinel sign to percussion sural, superficial peroneal, deep peroneal, saphenous, and posterior tibial nerves right and left ankles and feet.     Psychiatric:         Behavior: Behavior is cooperative.               Assessment:       Encounter Diagnoses   Name Primary?    Type 2 diabetes mellitus with microalbuminuria, with long-term current use of insulin     Type 2 diabetes mellitus with diabetic polyneuropathy, unspecified whether long term insulin use Yes    Onychomycosis due to dermatophyte     Tinea pedis of both feet     Valgus deformity of both great toes          Plan:       Fco was seen today for diabetes mellitus and diabetic foot exam.    Diagnoses and all orders for this visit:    Type 2 diabetes mellitus with diabetic polyneuropathy, unspecified whether long term insulin use  -      DIABETES FOOT EXAM  -     DIABETIC SHOES FOR HOME USE    Type 2 diabetes mellitus with microalbuminuria, with long-term current use of insulin  -     Ambulatory referral/consult to Podiatry  -      DIABETES FOOT EXAM  -     DIABETIC SHOES FOR HOME USE    Onychomycosis due to dermatophyte  -     ciclopirox (PENLAC) 8 % Soln; Apply topically nightly.  -      DIABETES FOOT EXAM  -     DIABETIC SHOES FOR HOME USE    Tinea pedis of both feet  -      DIABETES FOOT EXAM  -     DIABETIC SHOES FOR HOME USE    Valgus deformity of both great toes  -      DIABETES FOOT EXAM  -     DIABETIC SHOES FOR HOME USE    Other orders  -     ciclopirox (LOPROX) 0.77 % Crea; Apply topically 2 (two) times daily.      I counseled the patient on his conditions, their implications and medical management.        - Shoe inspection. Diabetic  Foot Education. Patient reminded of the importance of good nutrition and blood sugar control to help prevent podiatric complications of diabetes. Patient instructed on proper foot hygeine. We discussed wearing proper shoe gear, daily foot inspections, never walking without protective shoe gear, never putting sharp instruments to feet, routine podiatric visits at least annually.      Penlac, loprox, shoes, Rx DM shoes, heat [id number] inserts.        No follow-ups on file.

## 2022-08-18 ENCOUNTER — PATIENT OUTREACH (OUTPATIENT)
Dept: ADMINISTRATIVE | Facility: HOSPITAL | Age: 37
End: 2022-08-18
Payer: COMMERCIAL

## 2022-08-23 ENCOUNTER — PATIENT OUTREACH (OUTPATIENT)
Dept: ADMINISTRATIVE | Facility: HOSPITAL | Age: 37
End: 2022-08-23
Payer: COMMERCIAL

## 2022-08-23 ENCOUNTER — PATIENT MESSAGE (OUTPATIENT)
Dept: ADMINISTRATIVE | Facility: HOSPITAL | Age: 37
End: 2022-08-23
Payer: COMMERCIAL

## 2022-10-31 ENCOUNTER — CLINICAL SUPPORT (OUTPATIENT)
Dept: OPTOMETRY | Facility: CLINIC | Age: 37
End: 2022-10-31
Attending: FAMILY MEDICINE
Payer: COMMERCIAL

## 2022-10-31 DIAGNOSIS — Z13.5 ENCOUNTER FOR SCREENING FOR DIABETIC RETINOPATHY: ICD-10-CM

## 2022-10-31 DIAGNOSIS — E11.3293 MILD NONPROLIFERATIVE DIABETIC RETINOPATHY OF BOTH EYES WITHOUT MACULAR EDEMA ASSOCIATED WITH TYPE 2 DIABETES MELLITUS: Primary | ICD-10-CM

## 2022-10-31 PROCEDURE — 2024F PR 7 FIELD PHOTOS WITH INTERP/ REVIEW: ICD-10-PCS | Mod: CPTII,S$GLB,, | Performed by: FAMILY MEDICINE

## 2022-10-31 PROCEDURE — 92228 DIABETIC EYE SCREENING PHOTO: ICD-10-PCS | Mod: 26,S$GLB,, | Performed by: OPTOMETRIST

## 2022-10-31 PROCEDURE — 92228 DIABETIC EYE SCREENING PHOTO: ICD-10-PCS | Mod: TC,S$GLB,, | Performed by: FAMILY MEDICINE

## 2022-10-31 PROCEDURE — 2024F 7 FLD RTA PHOTO EVC RTNOPTHY: CPT | Mod: CPTII,S$GLB,, | Performed by: FAMILY MEDICINE

## 2022-10-31 PROCEDURE — 92228 IMG RTA DETC/MNTR DS PHY/QHP: CPT | Mod: 26,S$GLB,, | Performed by: OPTOMETRIST

## 2022-10-31 PROCEDURE — 92228 IMG RTA DETC/MNTR DS PHY/QHP: CPT | Mod: TC,S$GLB,, | Performed by: FAMILY MEDICINE

## 2022-10-31 NOTE — PROGRESS NOTES
Fco Zafar III is a 37 y.o. male here for a diabetic eye screening with non-dilated fundus photos per Dr. Lanza.    Patient cooperative?: Yes  Small pupils?: No  Last eye exam: 5/6/2019    For exam results, see Encounter Report.

## 2022-11-08 ENCOUNTER — PATIENT OUTREACH (OUTPATIENT)
Dept: ADMINISTRATIVE | Facility: HOSPITAL | Age: 37
End: 2022-11-08
Payer: COMMERCIAL

## 2022-11-21 ENCOUNTER — TELEPHONE (OUTPATIENT)
Dept: INTERNAL MEDICINE | Facility: CLINIC | Age: 37
End: 2022-11-21
Payer: COMMERCIAL

## 2022-11-21 NOTE — TELEPHONE ENCOUNTER
Called pt to see if he was still coming in for his appt or not. He thought it was next month, so he rescheduled. Appt time and date sent to advance users to be rescheduled.

## 2022-11-28 ENCOUNTER — OFFICE VISIT (OUTPATIENT)
Dept: INTERNAL MEDICINE | Facility: CLINIC | Age: 37
End: 2022-11-28
Attending: FAMILY MEDICINE
Payer: COMMERCIAL

## 2022-11-28 ENCOUNTER — LAB VISIT (OUTPATIENT)
Dept: LAB | Facility: HOSPITAL | Age: 37
End: 2022-11-28
Attending: FAMILY MEDICINE
Payer: COMMERCIAL

## 2022-11-28 VITALS
HEIGHT: 67 IN | BODY MASS INDEX: 35.88 KG/M2 | DIASTOLIC BLOOD PRESSURE: 88 MMHG | WEIGHT: 228.63 LBS | OXYGEN SATURATION: 97 % | HEART RATE: 108 BPM | SYSTOLIC BLOOD PRESSURE: 122 MMHG

## 2022-11-28 DIAGNOSIS — Z79.4 TYPE 2 DIABETES MELLITUS WITH MICROALBUMINURIA, WITH LONG-TERM CURRENT USE OF INSULIN: ICD-10-CM

## 2022-11-28 DIAGNOSIS — E11.29 MICROALBUMINURIA DUE TO TYPE 2 DIABETES MELLITUS: ICD-10-CM

## 2022-11-28 DIAGNOSIS — R80.9 MICROALBUMINURIA DUE TO TYPE 2 DIABETES MELLITUS: ICD-10-CM

## 2022-11-28 DIAGNOSIS — I10 ESSENTIAL HYPERTENSION: ICD-10-CM

## 2022-11-28 DIAGNOSIS — R80.9 TYPE 2 DIABETES MELLITUS WITH MICROALBUMINURIA, WITH LONG-TERM CURRENT USE OF INSULIN: ICD-10-CM

## 2022-11-28 DIAGNOSIS — E11.29 TYPE 2 DIABETES MELLITUS WITH MICROALBUMINURIA, WITH LONG-TERM CURRENT USE OF INSULIN: ICD-10-CM

## 2022-11-28 DIAGNOSIS — R80.9 TYPE 2 DIABETES MELLITUS WITH MICROALBUMINURIA, WITH LONG-TERM CURRENT USE OF INSULIN: Primary | ICD-10-CM

## 2022-11-28 DIAGNOSIS — E11.29 TYPE 2 DIABETES MELLITUS WITH MICROALBUMINURIA, WITH LONG-TERM CURRENT USE OF INSULIN: Primary | ICD-10-CM

## 2022-11-28 DIAGNOSIS — Z79.4 TYPE 2 DIABETES MELLITUS WITH MICROALBUMINURIA, WITH LONG-TERM CURRENT USE OF INSULIN: Primary | ICD-10-CM

## 2022-11-28 LAB
ESTIMATED AVG GLUCOSE: ABNORMAL MG/DL (ref 68–131)
HBA1C MFR BLD: >14 % (ref 4–5.6)

## 2022-11-28 PROCEDURE — 36415 COLL VENOUS BLD VENIPUNCTURE: CPT | Performed by: FAMILY MEDICINE

## 2022-11-28 PROCEDURE — 3079F DIAST BP 80-89 MM HG: CPT | Mod: CPTII,S$GLB,, | Performed by: FAMILY MEDICINE

## 2022-11-28 PROCEDURE — 3046F HEMOGLOBIN A1C LEVEL >9.0%: CPT | Mod: CPTII,S$GLB,, | Performed by: FAMILY MEDICINE

## 2022-11-28 PROCEDURE — 99999 PR PBB SHADOW E&M-EST. PATIENT-LVL IV: ICD-10-PCS | Mod: PBBFAC,,, | Performed by: FAMILY MEDICINE

## 2022-11-28 PROCEDURE — 3008F PR BODY MASS INDEX (BMI) DOCUMENTED: ICD-10-PCS | Mod: CPTII,S$GLB,, | Performed by: FAMILY MEDICINE

## 2022-11-28 PROCEDURE — 3079F PR MOST RECENT DIASTOLIC BLOOD PRESSURE 80-89 MM HG: ICD-10-PCS | Mod: CPTII,S$GLB,, | Performed by: FAMILY MEDICINE

## 2022-11-28 PROCEDURE — 1160F PR REVIEW ALL MEDS BY PRESCRIBER/CLIN PHARMACIST DOCUMENTED: ICD-10-PCS | Mod: CPTII,S$GLB,, | Performed by: FAMILY MEDICINE

## 2022-11-28 PROCEDURE — 3066F NEPHROPATHY DOC TX: CPT | Mod: CPTII,S$GLB,, | Performed by: FAMILY MEDICINE

## 2022-11-28 PROCEDURE — 4010F ACE/ARB THERAPY RXD/TAKEN: CPT | Mod: CPTII,S$GLB,, | Performed by: FAMILY MEDICINE

## 2022-11-28 PROCEDURE — 99999 PR PBB SHADOW E&M-EST. PATIENT-LVL IV: CPT | Mod: PBBFAC,,, | Performed by: FAMILY MEDICINE

## 2022-11-28 PROCEDURE — 3008F BODY MASS INDEX DOCD: CPT | Mod: CPTII,S$GLB,, | Performed by: FAMILY MEDICINE

## 2022-11-28 PROCEDURE — 3046F PR MOST RECENT HEMOGLOBIN A1C LEVEL > 9.0%: ICD-10-PCS | Mod: CPTII,S$GLB,, | Performed by: FAMILY MEDICINE

## 2022-11-28 PROCEDURE — 83036 HEMOGLOBIN GLYCOSYLATED A1C: CPT | Performed by: FAMILY MEDICINE

## 2022-11-28 PROCEDURE — 3074F PR MOST RECENT SYSTOLIC BLOOD PRESSURE < 130 MM HG: ICD-10-PCS | Mod: CPTII,S$GLB,, | Performed by: FAMILY MEDICINE

## 2022-11-28 PROCEDURE — 99214 OFFICE O/P EST MOD 30 MIN: CPT | Mod: S$GLB,,, | Performed by: FAMILY MEDICINE

## 2022-11-28 PROCEDURE — 3074F SYST BP LT 130 MM HG: CPT | Mod: CPTII,S$GLB,, | Performed by: FAMILY MEDICINE

## 2022-11-28 PROCEDURE — 3060F PR POS MICROALBUMINURIA RESULT DOCUMENTED/REVIEW: ICD-10-PCS | Mod: CPTII,S$GLB,, | Performed by: FAMILY MEDICINE

## 2022-11-28 PROCEDURE — 4010F PR ACE/ARB THEARPY RXD/TAKEN: ICD-10-PCS | Mod: CPTII,S$GLB,, | Performed by: FAMILY MEDICINE

## 2022-11-28 PROCEDURE — 99214 PR OFFICE/OUTPT VISIT, EST, LEVL IV, 30-39 MIN: ICD-10-PCS | Mod: S$GLB,,, | Performed by: FAMILY MEDICINE

## 2022-11-28 PROCEDURE — 1159F PR MEDICATION LIST DOCUMENTED IN MEDICAL RECORD: ICD-10-PCS | Mod: CPTII,S$GLB,, | Performed by: FAMILY MEDICINE

## 2022-11-28 PROCEDURE — 1160F RVW MEDS BY RX/DR IN RCRD: CPT | Mod: CPTII,S$GLB,, | Performed by: FAMILY MEDICINE

## 2022-11-28 PROCEDURE — 3060F POS MICROALBUMINURIA REV: CPT | Mod: CPTII,S$GLB,, | Performed by: FAMILY MEDICINE

## 2022-11-28 PROCEDURE — 1159F MED LIST DOCD IN RCRD: CPT | Mod: CPTII,S$GLB,, | Performed by: FAMILY MEDICINE

## 2022-11-28 PROCEDURE — 3066F PR DOCUMENTATION OF TREATMENT FOR NEPHROPATHY: ICD-10-PCS | Mod: CPTII,S$GLB,, | Performed by: FAMILY MEDICINE

## 2022-11-28 NOTE — PROGRESS NOTES
"Subjective:       Patient ID: Fco Zafar III is a 37 y.o. male.    Chief Complaint: Follow-up    HPI    Fco Zafar III is a 37 y.o. male PMHx DM for checkup.     Tolerating xr metformin, taking trulicity once weekly. Taking insulin sometimes twice daily, sometimes once daily, sometimes misses a day or two. Not taking mealtime insulin.     #Cards: HLD  - reg: Lisinopril 2.5 qd; Lipitor 40 qd     #Endo: DM (A1c 6/2022 >14) w/ microalb  - est w/ NP Matt - upcoming appt 1/9/23  - reg: Insulin (35U bid), Metformin  qd; Trulicity 1.5 qwk  - adverse effects w/ metformin IR 1000 (GI effects)  - eye exam 10/2022  - foot exam 6/2022  - urine 1/2022, microalb     #BMI 35    Review of Systems   Constitutional:  Positive for fatigue. Negative for chills and fever.   HENT:  Negative for congestion.    Respiratory:  Negative for cough and shortness of breath.    Cardiovascular:  Negative for chest pain and palpitations.   Gastrointestinal:  Negative for abdominal pain, constipation, diarrhea, nausea and vomiting.   Genitourinary:  Negative for dysuria and hematuria.   Musculoskeletal:  Negative for back pain.   Skin:  Negative for rash.   Neurological:  Negative for weakness, numbness and headaches.       Past Medical History:   Diagnosis Date    Diabetes     DKA (diabetic ketoacidoses)     Hyperlipidemia     Hypertension         Prior to Admission medications    Medication Sig Start Date End Date Taking? Authorizing Provider   atorvastatin (LIPITOR) 40 MG tablet Take 1 tablet (40 mg total) by mouth once daily. 3/28/22   Nirav Lanza MD   BD AMELIA 2ND GEN PEN NEEDLE 32 gauge x 5/32" Ndle USE WITH INSULIN PEN FOUR TIMES DAILY 10/7/22   Nirav Lanza MD   blood sugar diagnostic Strp To check BG 4 times daily, to use with insurance preferred meter 4/28/20   JOHANA Mancuso, ROSIE   blood-glucose meter,continuous (DEXCOM G6 ) Misc Use as directed, e 11.65 6/14/22   JOHANA Mancuso FNP " "  blood-glucose sensor (DEXCOM G6 SENSOR) Mabel Change every 10 days, e 11.65 6/14/22   JOHANA Mancuso FNP   blood-glucose transmitter (DEXCOM G6 TRANSMITTER) Mabel Change every 3 months 6/14/22   JOHANA Mancuso, ROSIE   ciclopirox (LOPROX) 0.77 % Crea Apply topically 2 (two) times daily. 6/27/22   Jc Hubbard DPM   ciclopirox (PENLAC) 8 % Soln Apply topically nightly. 6/27/22   Jc Hubbard DPM   dulaglutide (TRULICITY) 1.5 mg/0.5 mL pen injector Inject 1.5 mg into the skin every 7 days. 6/13/22 6/13/23  Nirav Lanza MD   insulin aspart U-100 (NOVOLOG FLEXPEN U-100 INSULIN) 100 unit/mL (3 mL) InPn pen Inject 12 units before meals plus scale 150-200+2, 201-250+4, 251-300+6, 301-350+8, >350+10. Max daily 66 units. 6/14/22   JOHANA Mancuso FNP   insulin degludec (TRESIBA FLEXTOUCH U-100) 100 unit/mL (3 mL) insulin pen Inject 60 units at night. 6/14/22   JOHANA Mancuso, ROSIE   lisinopriL (PRINIVIL,ZESTRIL) 2.5 MG tablet Take 1 tablet (2.5 mg total) by mouth once daily. 3/28/22   Nirav Lanza MD   metFORMIN (GLUCOPHAGE-XR) 500 MG ER 24hr tablet Take 1 tablet (500 mg total) by mouth daily with breakfast. 6/13/22 6/13/23  Nirav Lanza MD   MICRO THIN LANCETS 33 gauge Misc  4/28/20   Historical Provider   sildenafiL (VIAGRA) 100 MG tablet Take 1 tablet (100 mg total) by mouth daily as needed for Erectile Dysfunction. 6/13/22 6/13/23  Nirav Lanza MD   insulin glargine (LANTUS U-100 INSULIN) 100 unit/mL injection Inject 30 Units into the skin 2 (two) times a day. 1/5/22 1/10/22  Nirav Lanza MD        Past medical history, surgical history, and family medical history reviewed and updated as appropriate.    Medications and allergies reviewed.     Objective:          Vitals:    11/28/22 1310   BP: 122/88   BP Location: Left arm   Patient Position: Sitting   Pulse: 108   SpO2: 97%   Weight: 103.7 kg (228 lb 9.9 oz)   Height: 5' 7" (1.702 m)     Body mass index is 35.81 " kg/m².  Physical Exam  Vitals and nursing note reviewed.   Constitutional:       General: He is not in acute distress.     Appearance: Normal appearance. He is well-developed.   Eyes:      Extraocular Movements: Extraocular movements intact.   Cardiovascular:      Rate and Rhythm: Normal rate and regular rhythm.      Pulses: Normal pulses.      Heart sounds: Normal heart sounds. No murmur heard.  Pulmonary:      Effort: Pulmonary effort is normal. No respiratory distress.   Neurological:      Mental Status: He is alert and oriented to person, place, and time.   Psychiatric:         Mood and Affect: Mood normal.         Behavior: Behavior normal.       Lab Results   Component Value Date    WBC 4.62 01/05/2022    HGB 13.9 (L) 01/05/2022    HCT 42.9 01/05/2022     01/05/2022    CHOL 235 (H) 01/05/2022    TRIG 235 (H) 01/05/2022    HDL 43 01/05/2022    ALT 15 01/05/2022    AST 17 01/05/2022     (L) 01/05/2022    K 4.2 01/05/2022    CL 98 01/05/2022    CREATININE 1.0 01/05/2022    BUN 9 01/05/2022    CO2 25 01/05/2022    TSH 0.966 01/05/2022    GLUF 140 (H) 04/28/2018    HGBA1C >14.0 (H) 06/13/2022       Assessment:       1. Type 2 diabetes mellitus with microalbuminuria, with long-term current use of insulin    2. Essential hypertension    3. Microalbuminuria due to type 2 diabetes mellitus          Plan:   1. Type 2 diabetes mellitus with microalbuminuria, with long-term current use of insulin  Overview:  Recheck lab  - rec taking insulin more consistently  - cont metformin XR  - cont trulicity  - adverse GI effects w/ metformin IR    Orders:  -     Hemoglobin A1C; Future; Expected date: 11/28/2022    2. Essential hypertension  Overview:  bp controlled today, cont curr reg      3. Microalbuminuria due to type 2 diabetes mellitus  Overview:  Cont ACEi        Health maintenance reviewed with patient.     No follow-ups on file.    Nirav Lanza MD  Family Medicine / Primary Care  [facility]  11/28/2022

## 2022-11-30 ENCOUNTER — TELEPHONE (OUTPATIENT)
Dept: INTERNAL MEDICINE | Facility: CLINIC | Age: 37
End: 2022-11-30
Payer: COMMERCIAL

## 2022-11-30 RX ORDER — METFORMIN HYDROCHLORIDE 500 MG/1
500 TABLET, EXTENDED RELEASE ORAL 2 TIMES DAILY WITH MEALS
Qty: 180 TABLET | Refills: 3 | Status: SHIPPED | OUTPATIENT
Start: 2022-11-30 | End: 2023-01-09 | Stop reason: SDUPTHER

## 2022-12-03 ENCOUNTER — TELEPHONE (OUTPATIENT)
Dept: INTERNAL MEDICINE | Facility: CLINIC | Age: 37
End: 2022-12-03
Payer: COMMERCIAL

## 2022-12-03 NOTE — TELEPHONE ENCOUNTER
Called and spoke to patient about his new medication regimen. Discussed new medication and working on decreasing A1C. Reminded patient to keep upcoming appt in January.

## 2023-01-03 ENCOUNTER — LAB VISIT (OUTPATIENT)
Dept: LAB | Facility: HOSPITAL | Age: 38
End: 2023-01-03
Attending: NURSE PRACTITIONER
Payer: COMMERCIAL

## 2023-01-03 DIAGNOSIS — Z79.4 TYPE 2 DIABETES MELLITUS WITH MICROALBUMINURIA, WITH LONG-TERM CURRENT USE OF INSULIN: ICD-10-CM

## 2023-01-03 DIAGNOSIS — E11.29 TYPE 2 DIABETES MELLITUS WITH MICROALBUMINURIA, WITH LONG-TERM CURRENT USE OF INSULIN: ICD-10-CM

## 2023-01-03 DIAGNOSIS — R80.9 TYPE 2 DIABETES MELLITUS WITH MICROALBUMINURIA, WITH LONG-TERM CURRENT USE OF INSULIN: ICD-10-CM

## 2023-01-03 LAB
ANION GAP SERPL CALC-SCNC: 8 MMOL/L (ref 8–16)
BUN SERPL-MCNC: 11 MG/DL (ref 6–20)
CALCIUM SERPL-MCNC: 9.9 MG/DL (ref 8.7–10.5)
CHLORIDE SERPL-SCNC: 103 MMOL/L (ref 95–110)
CO2 SERPL-SCNC: 27 MMOL/L (ref 23–29)
CREAT SERPL-MCNC: 1.4 MG/DL (ref 0.5–1.4)
EST. GFR  (NO RACE VARIABLE): >60 ML/MIN/1.73 M^2
GLUCOSE SERPL-MCNC: 335 MG/DL (ref 70–110)
POTASSIUM SERPL-SCNC: 4.1 MMOL/L (ref 3.5–5.1)
SODIUM SERPL-SCNC: 138 MMOL/L (ref 136–145)

## 2023-01-03 PROCEDURE — 36415 COLL VENOUS BLD VENIPUNCTURE: CPT | Mod: PO | Performed by: NURSE PRACTITIONER

## 2023-01-03 PROCEDURE — 80048 BASIC METABOLIC PNL TOTAL CA: CPT | Performed by: NURSE PRACTITIONER

## 2023-01-03 PROCEDURE — 83036 HEMOGLOBIN GLYCOSYLATED A1C: CPT | Performed by: NURSE PRACTITIONER

## 2023-01-04 LAB
ESTIMATED AVG GLUCOSE: ABNORMAL MG/DL (ref 68–131)
HBA1C MFR BLD: >14 % (ref 4–5.6)

## 2023-01-06 PROBLEM — E78.2 MIXED HYPERLIPIDEMIA: Status: RESOLVED | Noted: 2020-02-06 | Resolved: 2023-01-06

## 2023-01-06 PROBLEM — E66.01 SEVERE OBESITY (BMI 35.0-39.9) WITH COMORBIDITY: Status: RESOLVED | Noted: 2020-02-06 | Resolved: 2023-01-06

## 2023-01-06 PROBLEM — E10.69 TYPE 1 DIABETES MELLITUS WITH OBESITY: Status: ACTIVE | Noted: 2022-01-05

## 2023-01-06 NOTE — PROGRESS NOTES
CHIEF COMPLAINT: Type 1 Diabetes/HILDA     HPI: Mr. Fco Zafar III is a 37 y.o. male who was diagnosed with Type 2 DM in 2012,  mg/dl (initial)  Started insulin in 2012.    2/1/2020 c peptide type 1 dm/HILDA diagnosis   [place] issues with omnipod dash and dexcom in the past  Has interest in adalberto, too again.  Admits to poor dietary habits, missing doses of insulin time to time.     Lab Results   Component Value Date    HGBA1C >14.0 (H) 01/03/2023     On MDI   Testing 4 times a day  Injections 4 x a day  Type 1 demonstrated an understanding of technology and motivated to use the device correctly. Patients are expected to adhere to a diabetes treatment plan and are capable of recognizing alerts and alarms.    Lowest 60s-70s  Highest 336 mg/dl    Reviewed family history  +familial history of dm : mother   Has h/o ED, HTN, HLD.   Has pcp Dr. Lanza now.  +covid 19 3/21/2020, SOB/LUGO     PREVIOUS DIABETES MEDICATIONS TRIED  Metformin   lantus   humalog  Tresiba  Trulicity     CURRENT DIABETIC MEDS: trulicity 1.5 mg weekly , novolog 30 units ac ? , metformin 500 mg bid, tresiba- not taking at this time- needs rx      Diabetes Management Status    Statin: Taking  ACE/ARB: Taking    Screening or Prevention Patient's value Goal Complete/Controlled?   HgA1C Testing and Control   Lab Results   Component Value Date    HGBA1C >14.0 (H) 01/03/2023      Annually/Less than 8% No   Lipid profile : 01/05/2022 Annually No   LDL control Lab Results   Component Value Date    LDLCALC 145.0 01/05/2022    Annually/Less than 100 mg/dl  No   Nephropathy screening Lab Results   Component Value Date    LABMICR 16.0 01/05/2022     Lab Results   Component Value Date    PROTEINUA Trace (A) 01/01/2021    Annually No   Blood pressure BP Readings from Last 1 Encounters:   11/28/22 122/88    Less than 140/90 Yes   Dilated retinal exam : 10/31/2022 Annually Yes   Foot exam   : 06/27/2022 Annually No     REVIEW OF SYSTEMS  General: no weakness,  "fatigue, +weight loss 8# in the past 4 months   Eyes: no double or blurred vision, eye pain, or redness  Cardiovascular: no chest pain, palpitations, edema, or murmurs.   Respiratory: no cough or dyspnea.   GI: no heartburn, nausea, or changes in bowel patterns; good appetite.   Skin: no rashes, dryness, itching, or reactions at insulin injection sites.  Neuro: +numbness, tingling-improved, tremors, or vertigo.   Endocrine: no polyuria, polydipsia, polyphagia, heat or cold intolerance.     Vital Signs  /82 (BP Location: Left arm, Patient Position: Sitting, BP Method: Large (Manual))   Pulse 104   Ht 5' 7" (1.702 m)   Wt 103.7 kg (228 lb 9.9 oz)   SpO2 98%   BMI 35.81 kg/m²     Hemoglobin A1C   Date Value Ref Range Status   01/03/2023 >14.0 (H) 4.0 - 5.6 % Final     Comment:     ADA Screening Guidelines:  5.7-6.4%  Consistent with prediabetes  >or=6.5%  Consistent with diabetes    High levels of fetal hemoglobin interfere with the HbA1C  assay. Heterozygous hemoglobin variants (HbS, HgC, etc)do  not significantly interfere with this assay.   However, presence of multiple variants may affect accuracy.     11/28/2022 >14.0 (H) 4.0 - 5.6 % Final     Comment:     ADA Screening Guidelines:  5.7-6.4%  Consistent with prediabetes  >or=6.5%  Consistent with diabetes    High levels of fetal hemoglobin interfere with the HbA1C  assay. Heterozygous hemoglobin variants (HbS, HgC, etc)do  not significantly interfere with this assay.   However, presence of multiple variants may affect accuracy.     06/13/2022 >14.0 (H) 4.0 - 5.6 % Final     Comment:     ADA Screening Guidelines:  5.7-6.4%  Consistent with prediabetes  >or=6.5%  Consistent with diabetes    High levels of fetal hemoglobin interfere with the HbA1C  assay. Heterozygous hemoglobin variants (HbS, HgC, etc)do  not significantly interfere with this assay.   However, presence of multiple variants may affect accuracy.          Chemistry        Component Value " Date/Time     01/03/2023 1635    K 4.1 01/03/2023 1635     01/03/2023 1635    CO2 27 01/03/2023 1635    BUN 11 01/03/2023 1635    CREATININE 1.4 01/03/2023 1635     (H) 01/03/2023 1635        Component Value Date/Time    CALCIUM 9.9 01/03/2023 1635    ALKPHOS 128 01/05/2022 1131    AST 17 01/05/2022 1131    ALT 15 01/05/2022 1131    BILITOT 0.4 01/05/2022 1131    ESTGFRAFRICA >60.0 01/05/2022 1131    EGFRNONAA >60.0 01/05/2022 1131           Lab Results   Component Value Date    TSH 0.966 01/05/2022      Lab Results   Component Value Date    CHOL 235 (H) 01/05/2022    CHOL 149 02/01/2020    CHOL 107 (L) 04/28/2018     Lab Results   Component Value Date    HDL 43 01/05/2022    HDL 40 02/01/2020    HDL 31 (L) 04/28/2018     Lab Results   Component Value Date    LDLCALC 145.0 01/05/2022    LDLCALC 91.0 02/01/2020    LDLCALC 67.6 04/28/2018     Lab Results   Component Value Date    TRIG 235 (H) 01/05/2022    TRIG 90 02/01/2020    TRIG 42 04/28/2018     Lab Results   Component Value Date    CHOLHDL 18.3 (L) 01/05/2022    CHOLHDL 26.8 02/01/2020    CHOLHDL 29.0 04/28/2018       PHYSICAL EXAMINATION  Constitutional: Appears well, no distress. Reviewed vitals above.  Eyes: conjunctivae & lids intact; PERRLA, EOMs intact; optic discs   Neck: Supple, trachea midline.   Respiratory: No wheezes, even and unlabored  Cardiovascular: no edema or varicosities  Lymph: deferred  Skin: warm and dry; no injection site reactions, no acanthosis nigracans observed.  Neuro:patient alert and cooperative, normal affect; steady gait.  Psychiatric: judgement & insight intact, orientation of time, place & person intact, memory; mood & affect wnl     Diabetes Foot Exam:   deferred    Assessment/Plan  1. Type 1 diabetes mellitus with microalbuminuria  Hemoglobin A1C    Microalbumin/Creatinine Ratio, Urine    Hemoglobin A1C    blood-glucose sensor (FREESTYLE MARGIE 3 SENSOR) Mabel    insulin aspart U-100 (NOVOLOG FLEXPEN U-100  "INSULIN) 100 unit/mL (3 mL) InPn pen    metFORMIN (GLUCOPHAGE-XR) 500 MG ER 24hr tablet    dulaglutide (TRULICITY) 1.5 mg/0.5 mL pen injector    insulin degludec (TRESIBA FLEXTOUCH U-100) 100 unit/mL (3 mL) insulin pen    pen needle, diabetic (BD AMELIA 2ND GEN PEN NEEDLE) 32 gauge x 5/32" Ndle      2. Type 1 diabetes mellitus with obesity        3. Severe obesity (BMI 35.0-39.9) with comorbidity        4. Onychomycosis of multiple toenails with type 2 diabetes mellitus        5. Mixed hyperlipidemia        6. Microalbuminuria due to type 2 diabetes mellitus  Microalbumin/Creatinine Ratio, Urine      7. Hypertension associated with diabetes        8. Hyperlipidemia associated with type 2 diabetes mellitus  Lipid Panel      9. Type 2 diabetes mellitus with microalbuminuria, with long-term current use of insulin  dulaglutide (TRULICITY) 1.5 mg/0.5 mL pen injector      10. Type 2 diabetes mellitus with hyperglycemia, with long-term current use of insulin  pen needle, diabetic (BD AMELIA 2ND GEN PEN NEEDLE) 32 gauge x 5/32" Ndle        1-10. Follow up in 4 months   A1c, urine mac lipid in 4 months  A1c in 6 weeks   Roger 3- send rx   Tresiba - change to 40 units at night  Novolog 15 units ac w/ scale use 180-230+2, etc  Continue trulicity 1.5 mg weekly   Continue metformin 500 mg bid   Body mass index is 35.81 kg/m².  May increase insulin resistance  Discussed meal prepping  Bp controlled   Podiatry referral       Follow up in about 4 months (around 5/9/2023).             "

## 2023-01-09 ENCOUNTER — OFFICE VISIT (OUTPATIENT)
Dept: INTERNAL MEDICINE | Facility: CLINIC | Age: 38
End: 2023-01-09
Payer: COMMERCIAL

## 2023-01-09 VITALS
WEIGHT: 228.63 LBS | OXYGEN SATURATION: 98 % | DIASTOLIC BLOOD PRESSURE: 82 MMHG | SYSTOLIC BLOOD PRESSURE: 122 MMHG | BODY MASS INDEX: 35.88 KG/M2 | HEIGHT: 67 IN | HEART RATE: 104 BPM

## 2023-01-09 DIAGNOSIS — I15.2 HYPERTENSION ASSOCIATED WITH DIABETES: ICD-10-CM

## 2023-01-09 DIAGNOSIS — E66.01 SEVERE OBESITY (BMI 35.0-39.9) WITH COMORBIDITY: ICD-10-CM

## 2023-01-09 DIAGNOSIS — E11.29 MICROALBUMINURIA DUE TO TYPE 2 DIABETES MELLITUS: ICD-10-CM

## 2023-01-09 DIAGNOSIS — E10.29 TYPE 1 DIABETES MELLITUS WITH MICROALBUMINURIA: Primary | ICD-10-CM

## 2023-01-09 DIAGNOSIS — E78.5 HYPERLIPIDEMIA ASSOCIATED WITH TYPE 2 DIABETES MELLITUS: ICD-10-CM

## 2023-01-09 DIAGNOSIS — E11.69 HYPERLIPIDEMIA ASSOCIATED WITH TYPE 2 DIABETES MELLITUS: ICD-10-CM

## 2023-01-09 DIAGNOSIS — R80.9 TYPE 2 DIABETES MELLITUS WITH MICROALBUMINURIA, WITH LONG-TERM CURRENT USE OF INSULIN: ICD-10-CM

## 2023-01-09 DIAGNOSIS — Z79.4 TYPE 2 DIABETES MELLITUS WITH HYPERGLYCEMIA, WITH LONG-TERM CURRENT USE OF INSULIN: ICD-10-CM

## 2023-01-09 DIAGNOSIS — E78.2 MIXED HYPERLIPIDEMIA: ICD-10-CM

## 2023-01-09 DIAGNOSIS — E10.69 TYPE 1 DIABETES MELLITUS WITH OBESITY: ICD-10-CM

## 2023-01-09 DIAGNOSIS — E66.9 TYPE 1 DIABETES MELLITUS WITH OBESITY: ICD-10-CM

## 2023-01-09 DIAGNOSIS — E11.29 TYPE 2 DIABETES MELLITUS WITH MICROALBUMINURIA, WITH LONG-TERM CURRENT USE OF INSULIN: ICD-10-CM

## 2023-01-09 DIAGNOSIS — E11.65 TYPE 2 DIABETES MELLITUS WITH HYPERGLYCEMIA, WITH LONG-TERM CURRENT USE OF INSULIN: ICD-10-CM

## 2023-01-09 DIAGNOSIS — B35.1 ONYCHOMYCOSIS OF MULTIPLE TOENAILS WITH TYPE 2 DIABETES MELLITUS: ICD-10-CM

## 2023-01-09 DIAGNOSIS — Z79.4 TYPE 2 DIABETES MELLITUS WITH MICROALBUMINURIA, WITH LONG-TERM CURRENT USE OF INSULIN: ICD-10-CM

## 2023-01-09 DIAGNOSIS — E11.59 HYPERTENSION ASSOCIATED WITH DIABETES: ICD-10-CM

## 2023-01-09 DIAGNOSIS — R80.9 TYPE 1 DIABETES MELLITUS WITH MICROALBUMINURIA: Primary | ICD-10-CM

## 2023-01-09 DIAGNOSIS — R80.9 MICROALBUMINURIA DUE TO TYPE 2 DIABETES MELLITUS: ICD-10-CM

## 2023-01-09 DIAGNOSIS — E11.69 ONYCHOMYCOSIS OF MULTIPLE TOENAILS WITH TYPE 2 DIABETES MELLITUS: ICD-10-CM

## 2023-01-09 PROCEDURE — 3046F PR MOST RECENT HEMOGLOBIN A1C LEVEL > 9.0%: ICD-10-PCS | Mod: CPTII,S$GLB,, | Performed by: NURSE PRACTITIONER

## 2023-01-09 PROCEDURE — 99999 PR PBB SHADOW E&M-EST. PATIENT-LVL V: ICD-10-PCS | Mod: PBBFAC,,, | Performed by: NURSE PRACTITIONER

## 2023-01-09 PROCEDURE — 99214 PR OFFICE/OUTPT VISIT, EST, LEVL IV, 30-39 MIN: ICD-10-PCS | Mod: S$GLB,,, | Performed by: NURSE PRACTITIONER

## 2023-01-09 PROCEDURE — 3079F DIAST BP 80-89 MM HG: CPT | Mod: CPTII,S$GLB,, | Performed by: NURSE PRACTITIONER

## 2023-01-09 PROCEDURE — 3008F PR BODY MASS INDEX (BMI) DOCUMENTED: ICD-10-PCS | Mod: CPTII,S$GLB,, | Performed by: NURSE PRACTITIONER

## 2023-01-09 PROCEDURE — 4010F ACE/ARB THERAPY RXD/TAKEN: CPT | Mod: CPTII,S$GLB,, | Performed by: NURSE PRACTITIONER

## 2023-01-09 PROCEDURE — 99214 OFFICE O/P EST MOD 30 MIN: CPT | Mod: S$GLB,,, | Performed by: NURSE PRACTITIONER

## 2023-01-09 PROCEDURE — 1160F PR REVIEW ALL MEDS BY PRESCRIBER/CLIN PHARMACIST DOCUMENTED: ICD-10-PCS | Mod: CPTII,S$GLB,, | Performed by: NURSE PRACTITIONER

## 2023-01-09 PROCEDURE — 3008F BODY MASS INDEX DOCD: CPT | Mod: CPTII,S$GLB,, | Performed by: NURSE PRACTITIONER

## 2023-01-09 PROCEDURE — 3079F PR MOST RECENT DIASTOLIC BLOOD PRESSURE 80-89 MM HG: ICD-10-PCS | Mod: CPTII,S$GLB,, | Performed by: NURSE PRACTITIONER

## 2023-01-09 PROCEDURE — 3046F HEMOGLOBIN A1C LEVEL >9.0%: CPT | Mod: CPTII,S$GLB,, | Performed by: NURSE PRACTITIONER

## 2023-01-09 PROCEDURE — 3074F SYST BP LT 130 MM HG: CPT | Mod: CPTII,S$GLB,, | Performed by: NURSE PRACTITIONER

## 2023-01-09 PROCEDURE — 1160F RVW MEDS BY RX/DR IN RCRD: CPT | Mod: CPTII,S$GLB,, | Performed by: NURSE PRACTITIONER

## 2023-01-09 PROCEDURE — 3074F PR MOST RECENT SYSTOLIC BLOOD PRESSURE < 130 MM HG: ICD-10-PCS | Mod: CPTII,S$GLB,, | Performed by: NURSE PRACTITIONER

## 2023-01-09 PROCEDURE — 1159F PR MEDICATION LIST DOCUMENTED IN MEDICAL RECORD: ICD-10-PCS | Mod: CPTII,S$GLB,, | Performed by: NURSE PRACTITIONER

## 2023-01-09 PROCEDURE — 99999 PR PBB SHADOW E&M-EST. PATIENT-LVL V: CPT | Mod: PBBFAC,,, | Performed by: NURSE PRACTITIONER

## 2023-01-09 PROCEDURE — 1159F MED LIST DOCD IN RCRD: CPT | Mod: CPTII,S$GLB,, | Performed by: NURSE PRACTITIONER

## 2023-01-09 PROCEDURE — 4010F PR ACE/ARB THEARPY RXD/TAKEN: ICD-10-PCS | Mod: CPTII,S$GLB,, | Performed by: NURSE PRACTITIONER

## 2023-01-09 RX ORDER — INSULIN DEGLUDEC 100 U/ML
INJECTION, SOLUTION SUBCUTANEOUS
Qty: 5 PEN | Refills: 6 | Status: ON HOLD | OUTPATIENT
Start: 2023-01-09 | End: 2023-03-30 | Stop reason: SDUPTHER

## 2023-01-09 RX ORDER — METFORMIN HYDROCHLORIDE 500 MG/1
500 TABLET, EXTENDED RELEASE ORAL 2 TIMES DAILY WITH MEALS
Qty: 180 TABLET | Refills: 3 | Status: SHIPPED | OUTPATIENT
Start: 2023-01-09 | End: 2024-02-06

## 2023-01-09 RX ORDER — DULAGLUTIDE 1.5 MG/.5ML
1.5 INJECTION, SOLUTION SUBCUTANEOUS
Qty: 4 PEN | Refills: 6 | Status: SHIPPED | OUTPATIENT
Start: 2023-01-09 | End: 2023-05-15

## 2023-01-09 RX ORDER — PEN NEEDLE, DIABETIC 30 GX3/16"
NEEDLE, DISPOSABLE MISCELLANEOUS
Qty: 400 EACH | Refills: 3 | Status: SHIPPED | OUTPATIENT
Start: 2023-01-09 | End: 2024-02-05

## 2023-01-09 RX ORDER — BLOOD-GLUCOSE SENSOR
EACH MISCELLANEOUS
Qty: 2 EACH | Refills: 11 | Status: SHIPPED | OUTPATIENT
Start: 2023-01-09 | End: 2023-11-06

## 2023-01-09 RX ORDER — INSULIN ASPART 100 [IU]/ML
INJECTION, SOLUTION INTRAVENOUS; SUBCUTANEOUS
Qty: 30 ML | Refills: 6 | Status: SHIPPED | OUTPATIENT
Start: 2023-01-09 | End: 2023-05-15

## 2023-01-09 RX ORDER — GLUCAGON 3 MG/1
POWDER NASAL
Qty: 2 EACH | Refills: 2 | Status: SHIPPED | OUTPATIENT
Start: 2023-01-09

## 2023-01-09 NOTE — PATIENT INSTRUCTIONS
Follow up in 4 months w/Irielle   A1c, urine mac lipid in 4 months  A1c in 6 weeks   Roger 3- send rx     Tresiba - change to 40 units at night (lime green/blue)  Novolog 15 units ac w/ scale use 180-230+2, etc (orange/blue)  Continue trulicity 1.5 mg weekly   Continue metformin 500 mg bid     Podiatry referral   Meal prepping needed    [email or web address]  [employer] julius  [employer] julius  [email or web address]     Lab Results   Component Value Date    HGBA1C >14.0 (H) 01/03/2023     Goal less than 7% (avg 150)

## 2023-01-23 ENCOUNTER — HOSPITAL ENCOUNTER (OUTPATIENT)
Dept: RADIOLOGY | Facility: HOSPITAL | Age: 38
Discharge: HOME | End: 2023-01-23
Attending: PODIATRIST
Payer: COMMERCIAL

## 2023-01-23 ENCOUNTER — OFFICE VISIT (OUTPATIENT)
Dept: PODIATRY | Facility: CLINIC | Age: 38
End: 2023-01-23
Payer: COMMERCIAL

## 2023-01-23 VITALS — HEIGHT: 67 IN | BODY MASS INDEX: 35.88 KG/M2 | WEIGHT: 228.63 LBS

## 2023-01-23 DIAGNOSIS — M79.672 PAIN IN BOTH FEET: ICD-10-CM

## 2023-01-23 DIAGNOSIS — Z79.4 TYPE 2 DIABETES MELLITUS WITH HYPERGLYCEMIA, WITH LONG-TERM CURRENT USE OF INSULIN: ICD-10-CM

## 2023-01-23 DIAGNOSIS — M79.671 PAIN IN BOTH FEET: Primary | ICD-10-CM

## 2023-01-23 DIAGNOSIS — E10.29 TYPE 1 DIABETES MELLITUS WITH MICROALBUMINURIA: ICD-10-CM

## 2023-01-23 DIAGNOSIS — M77.40 METATARSALGIA, UNSPECIFIED LATERALITY: ICD-10-CM

## 2023-01-23 DIAGNOSIS — M79.672 PAIN IN BOTH FEET: Primary | ICD-10-CM

## 2023-01-23 DIAGNOSIS — R80.9 TYPE 1 DIABETES MELLITUS WITH MICROALBUMINURIA: ICD-10-CM

## 2023-01-23 DIAGNOSIS — M79.671 PAIN IN BOTH FEET: ICD-10-CM

## 2023-01-23 DIAGNOSIS — E11.65 TYPE 2 DIABETES MELLITUS WITH HYPERGLYCEMIA, WITH LONG-TERM CURRENT USE OF INSULIN: ICD-10-CM

## 2023-01-23 PROCEDURE — 3008F PR BODY MASS INDEX (BMI) DOCUMENTED: ICD-10-PCS | Mod: CPTII,S$GLB,, | Performed by: PODIATRIST

## 2023-01-23 PROCEDURE — 73630 X-RAY EXAM OF FOOT: CPT | Mod: 26,,, | Performed by: RADIOLOGY

## 2023-01-23 PROCEDURE — 3008F BODY MASS INDEX DOCD: CPT | Mod: CPTII,S$GLB,, | Performed by: PODIATRIST

## 2023-01-23 PROCEDURE — 1159F PR MEDICATION LIST DOCUMENTED IN MEDICAL RECORD: ICD-10-PCS | Mod: CPTII,S$GLB,, | Performed by: PODIATRIST

## 2023-01-23 PROCEDURE — 3046F HEMOGLOBIN A1C LEVEL >9.0%: CPT | Mod: CPTII,S$GLB,, | Performed by: PODIATRIST

## 2023-01-23 PROCEDURE — 73630 XR FOOT COMPLETE 3 VIEW BILATERAL: ICD-10-PCS | Mod: 26,,, | Performed by: RADIOLOGY

## 2023-01-23 PROCEDURE — 99999 PR PBB SHADOW E&M-EST. PATIENT-LVL IV: CPT | Mod: PBBFAC,,, | Performed by: PODIATRIST

## 2023-01-23 PROCEDURE — 1159F MED LIST DOCD IN RCRD: CPT | Mod: CPTII,S$GLB,, | Performed by: PODIATRIST

## 2023-01-23 PROCEDURE — 99214 PR OFFICE/OUTPT VISIT, EST, LEVL IV, 30-39 MIN: ICD-10-PCS | Mod: S$GLB,,, | Performed by: PODIATRIST

## 2023-01-23 PROCEDURE — 73630 X-RAY EXAM OF FOOT: CPT | Mod: TC,50,FY,PO

## 2023-01-23 PROCEDURE — 4010F ACE/ARB THERAPY RXD/TAKEN: CPT | Mod: CPTII,S$GLB,, | Performed by: PODIATRIST

## 2023-01-23 PROCEDURE — 99214 OFFICE O/P EST MOD 30 MIN: CPT | Mod: S$GLB,,, | Performed by: PODIATRIST

## 2023-01-23 PROCEDURE — 99999 PR PBB SHADOW E&M-EST. PATIENT-LVL IV: ICD-10-PCS | Mod: PBBFAC,,, | Performed by: PODIATRIST

## 2023-01-23 PROCEDURE — 3046F PR MOST RECENT HEMOGLOBIN A1C LEVEL > 9.0%: ICD-10-PCS | Mod: CPTII,S$GLB,, | Performed by: PODIATRIST

## 2023-01-23 PROCEDURE — 4010F PR ACE/ARB THEARPY RXD/TAKEN: ICD-10-PCS | Mod: CPTII,S$GLB,, | Performed by: PODIATRIST

## 2023-01-23 RX ORDER — DICLOFENAC SODIUM 10 MG/G
2 GEL TOPICAL DAILY
Qty: 100 G | Refills: 3 | Status: SHIPPED | OUTPATIENT
Start: 2023-01-23 | End: 2023-11-06

## 2023-01-24 NOTE — PROGRESS NOTES
Subjective:      Patient ID: Fco Zafar III is a 37 y.o. male.    Chief Complaint: Diabetes Mellitus and Diabetic Foot Exam (11/28/22 Dr Lanza PCP)    Diabetes, increased risk amputation needing evaluation/management/optomization of foot care.         Chief Complaint   Patient presents with    Diabetes Mellitus    Diabetic Foot Exam     11/28/22 Dr Lanza PCP       Casual shoes bilateral    Review of Systems   Constitutional: Negative for chills, diaphoresis, fever, malaise/fatigue and night sweats.   Cardiovascular:  Negative for claudication, cyanosis, leg swelling and syncope.   Skin:  Positive for dry skin, itching and nail changes. Negative for color change, rash, suspicious lesions and unusual hair distribution.   Musculoskeletal:  Negative for falls, joint pain, joint swelling, muscle cramps, muscle weakness and stiffness.   Gastrointestinal:  Negative for constipation, diarrhea, nausea and vomiting.   Neurological:  Positive for sensory change. Negative for brief paralysis, disturbances in coordination, focal weakness, numbness, paresthesias and tremors.         Objective:      Physical Exam  Constitutional:       General: He is not in acute distress.     Appearance: He is well-developed. He is not diaphoretic.   Cardiovascular:      Pulses:           Popliteal pulses are 2+ on the right side and 2+ on the left side.        Dorsalis pedis pulses are 2+ on the right side and 2+ on the left side.        Posterior tibial pulses are 2+ on the right side and 2+ on the left side.      Comments: Capillary refill 3 seconds all toes/distal feet, all toes/both feet warm to touch.      Negative lymphadenopathy bilateral popliteal fossa and tarsal tunnel.      Negavie lower extremity edema bilateral.    Musculoskeletal:      Right ankle: No swelling, deformity, ecchymosis or lacerations. Normal range of motion. Normal pulse.      Right Achilles Tendon: Normal. No defects. Rush's test negative.      Comments:  Visible and palpable bunion without pain at dorsomedial 1st metatarsal head left andright.  Hallux abducted left and right partially reducible, tracks laterally without being track bound.  No ecchymosis, erythema, edema, or cardinal signs infection or signs of trauma same foot.    Otherwise, Normal angle, base, station of gait. All ten toes without clubbing, cyanosis, or signs of ischemia.  No pain to palpation bilateral lower extremities.  Range of motion, stability, muscle strength, and muscle tone normal bilateral feet and legs.     Moderate pain on palpation distal IM spaces 2-4 with pain radiating into adjacent toes    Decreased stride, station of gait.  apropulsive toe off.  Increased angle and base of gait.      Patient has hammertoes of digits 2-5 bilateral partially reducible without symptom today.     Visible and palpable bunion without pain at dorsomedial 1st metatarsal head right and left.  Hallux abducted right and left partially reducible, tracks laterally without being track bound.  No ecchymosis, erythema, edema, or cardinal signs infection or signs of trauma same foot.     Fat pad atrophy to heels and met heads bilateral       Lymphadenopathy:      Lower Body: No right inguinal adenopathy. No left inguinal adenopathy.      Comments: Negative lymphadenopathy bilateral popliteal fossa and tarsal tunnel.    Negative lymphangitic streaking bilateral feet/ankles/legs.   Skin:     General: Skin is warm and dry.      Capillary Refill: Capillary refill takes 2 to 3 seconds.      Coloration: Skin is not pale.      Findings: No abrasion, bruising, burn, ecchymosis, erythema, laceration, lesion or rash.      Nails: There is no clubbing.      Comments: Dry scale with superficial flakes over an erythematous base bottom of both feet without ulceration, drainage, pus, tracking, fluctuance, malodor, or cardinal signs infection.    Toenails 1st, 2nd, 3rd, 4th, 5th  bilateral are hypertrophic thickened 2-3 mm,  dystrophic, discolored tanish brown with tan, gray crumbly subungual debris.  Tender to distal nail plate pressure, without periungual skin abnormality of each.       Neurological:      Mental Status: He is alert and oriented to person, place, and time.      Sensory: Sensory deficit present.      Motor: No tremor, atrophy or abnormal muscle tone.      Gait: Gait normal.      Deep Tendon Reflexes:      Reflex Scores:       Patellar reflexes are 2+ on the right side and 2+ on the left side.       Achilles reflexes are 2+ on the right side and 2+ on the left side.     Comments: Decreased/absent vibratory sensation bilateral feet to 128Hz tuning fork.    Otherwise, Negative tinel sign to percussion sural, superficial peroneal, deep peroneal, saphenous, and posterior tibial nerves right and left ankles and feet.     Psychiatric:         Behavior: Behavior is cooperative.             Assessment:       Encounter Diagnoses   Name Primary?    Type 1 diabetes mellitus with microalbuminuria     Type 2 diabetes mellitus with hyperglycemia, with long-term current use of insulin     Pain in both feet Yes         Plan:       Fco was seen today for diabetes mellitus and diabetic foot exam.    Diagnoses and all orders for this visit:    Pain in both feet  -     X-Ray Foot Complete Bilateral; Future    Type 1 diabetes mellitus with microalbuminuria  -     Ambulatory referral/consult to Podiatry  -     DIABETIC SHOES FOR HOME USE    Type 2 diabetes mellitus with hyperglycemia, with long-term current use of insulin  -     Ambulatory referral/consult to Podiatry    Other orders  -     diclofenac sodium (VOLTAREN) 1 % Gel; Apply 2 g topically once daily.    I counseled the patient on his conditions, their implications and medical management.        - Shoe inspection. Diabetic Foot Education. Patient reminded of the importance of good nutrition and blood sugar control to help prevent podiatric complications of diabetes. Patient  instructed on proper foot hygeine. We discussed wearing proper shoe gear, daily foot inspections, never walking without protective shoe gear, never putting sharp instruments to feet, routine podiatric visits at least annually.      Rx Voltaren gel to be applied to affected area up to 3-4 x daily as needed for pain    Rx diabetic shoes with custom molded inserts to be worn at all times while ambulating. Prescription provided with list of local retailers.

## 2023-03-27 ENCOUNTER — ANESTHESIA EVENT (OUTPATIENT)
Dept: SURGERY | Facility: HOSPITAL | Age: 38
DRG: 853 | End: 2023-03-27
Payer: COMMERCIAL

## 2023-03-27 ENCOUNTER — HOSPITAL ENCOUNTER (INPATIENT)
Facility: HOSPITAL | Age: 38
LOS: 3 days | Discharge: HOME | DRG: 853 | End: 2023-03-30
Attending: EMERGENCY MEDICINE | Admitting: EMERGENCY MEDICINE
Payer: COMMERCIAL

## 2023-03-27 DIAGNOSIS — R73.9 HYPERGLYCEMIA: ICD-10-CM

## 2023-03-27 DIAGNOSIS — R07.9 CHEST PAIN: ICD-10-CM

## 2023-03-27 DIAGNOSIS — R80.9 TYPE 1 DIABETES MELLITUS WITH MICROALBUMINURIA: ICD-10-CM

## 2023-03-27 DIAGNOSIS — J36 PERITONSILLAR ABSCESS: Primary | ICD-10-CM

## 2023-03-27 DIAGNOSIS — E10.29 TYPE 1 DIABETES MELLITUS WITH MICROALBUMINURIA: ICD-10-CM

## 2023-03-27 PROBLEM — A41.9 SEPSIS: Status: ACTIVE | Noted: 2023-03-27

## 2023-03-27 PROBLEM — E11.10 DIABETIC KETOACIDOSIS WITHOUT COMA: Status: ACTIVE | Noted: 2023-03-27

## 2023-03-27 PROBLEM — D50.9 MICROCYTIC ANEMIA: Status: ACTIVE | Noted: 2023-03-27

## 2023-03-27 LAB
ALBUMIN SERPL BCP-MCNC: 3.5 G/DL (ref 3.5–5.2)
ALBUMIN SERPL-MCNC: 3.7 G/DL (ref 3.3–5.5)
ALP SERPL-CCNC: 142 U/L (ref 42–141)
ALP SERPL-CCNC: 151 U/L (ref 55–135)
ALT SERPL W/O P-5'-P-CCNC: 10 U/L (ref 10–44)
ANION GAP SERPL CALC-SCNC: 17 MMOL/L (ref 8–16)
ANION GAP SERPL CALC-SCNC: 19 MMOL/L (ref 8–16)
ANION GAP SERPL CALC-SCNC: 9 MMOL/L (ref 8–16)
AST SERPL-CCNC: 11 U/L (ref 10–40)
B-OH-BUTYR BLD STRIP-SCNC: 4.4 MMOL/L (ref 0–0.5)
BASOPHILS # BLD AUTO: 0.04 K/UL (ref 0–0.2)
BASOPHILS NFR BLD: 0.2 % (ref 0–1.9)
BILIRUB SERPL-MCNC: 0.4 MG/DL (ref 0.1–1)
BILIRUB SERPL-MCNC: 0.6 MG/DL (ref 0.2–1.6)
BUN SERPL-MCNC: 12 MG/DL (ref 6–20)
BUN SERPL-MCNC: 12 MG/DL (ref 6–20)
BUN SERPL-MCNC: 14 MG/DL (ref 6–20)
BUN SERPL-MCNC: 9 MG/DL (ref 7–22)
CALCIUM SERPL-MCNC: 9.3 MG/DL (ref 8.7–10.5)
CALCIUM SERPL-MCNC: 9.7 MG/DL (ref 8.7–10.5)
CALCIUM SERPL-MCNC: 9.8 MG/DL (ref 8.7–10.5)
CALCIUM SERPL-MCNC: 9.8 MG/DL (ref 8–10.3)
CHLORIDE SERPL-SCNC: 100 MMOL/L (ref 98–108)
CHLORIDE SERPL-SCNC: 101 MMOL/L (ref 95–110)
CHLORIDE SERPL-SCNC: 103 MMOL/L (ref 95–110)
CHLORIDE SERPL-SCNC: 107 MMOL/L (ref 95–110)
CO2 SERPL-SCNC: 17 MMOL/L (ref 23–29)
CO2 SERPL-SCNC: 18 MMOL/L (ref 23–29)
CO2 SERPL-SCNC: 22 MMOL/L (ref 23–29)
CREAT SERPL-MCNC: 0.8 MG/DL (ref 0.6–1.2)
CREAT SERPL-MCNC: 1 MG/DL (ref 0.5–1.4)
CREAT SERPL-MCNC: 1.1 MG/DL (ref 0.5–1.4)
CREAT SERPL-MCNC: 1.1 MG/DL (ref 0.5–1.4)
DIFFERENTIAL METHOD: ABNORMAL
EOSINOPHIL # BLD AUTO: 0 K/UL (ref 0–0.5)
EOSINOPHIL NFR BLD: 0 % (ref 0–8)
ERYTHROCYTE [DISTWIDTH] IN BLOOD BY AUTOMATED COUNT: 13 % (ref 11.5–14.5)
EST. GFR  (NO RACE VARIABLE): >60 ML/MIN/1.73 M^2
GLUCOSE SERPL-MCNC: 178 MG/DL (ref 70–110)
GLUCOSE SERPL-MCNC: 311 MG/DL (ref 70–110)
GLUCOSE SERPL-MCNC: 320 MG/DL (ref 70–110)
GLUCOSE SERPL-MCNC: 404 MG/DL (ref 73–118)
HCT VFR BLD AUTO: 39 % (ref 40–54)
HCT, POC: NORMAL
HGB BLD-MCNC: 12.6 G/DL (ref 14–18)
HGB, POC: NORMAL (ref 14–18)
IMM GRANULOCYTES # BLD AUTO: 0.09 K/UL (ref 0–0.04)
IMM GRANULOCYTES NFR BLD AUTO: 0.5 % (ref 0–0.5)
LACTATE SERPL-SCNC: 0.9 MMOL/L (ref 0.5–2.2)
LYMPHOCYTES # BLD AUTO: 0.9 K/UL (ref 1–4.8)
LYMPHOCYTES NFR BLD: 5 % (ref 18–48)
MCH RBC QN AUTO: 26 PG (ref 27–31)
MCH, POC: NORMAL
MCHC RBC AUTO-ENTMCNC: 32.3 G/DL (ref 32–36)
MCHC, POC: NORMAL
MCV RBC AUTO: 80 FL (ref 82–98)
MCV, POC: NORMAL
MONOCYTES # BLD AUTO: 0.2 K/UL (ref 0.3–1)
MONOCYTES NFR BLD: 1 % (ref 4–15)
MPV, POC: NORMAL
NEUTROPHILS # BLD AUTO: 16.7 K/UL (ref 1.8–7.7)
NEUTROPHILS NFR BLD: 93.3 % (ref 38–73)
NRBC BLD-RTO: 0 /100 WBC
PLATELET # BLD AUTO: 381 K/UL (ref 150–450)
PMV BLD AUTO: 9.6 FL (ref 9.2–12.9)
POC ALT (SGPT): 12 U/L (ref 10–47)
POC AST (SGOT): 23 U/L (ref 11–38)
POC PLATELET COUNT: NORMAL
POC TCO2: 24 MMOL/L (ref 18–33)
POCT GLUCOSE: 165 MG/DL (ref 70–110)
POCT GLUCOSE: 218 MG/DL (ref 70–110)
POCT GLUCOSE: 235 MG/DL (ref 70–110)
POCT GLUCOSE: 249 MG/DL (ref 70–110)
POCT GLUCOSE: 288 MG/DL (ref 70–110)
POCT GLUCOSE: 302 MG/DL (ref 70–110)
POCT GLUCOSE: 328 MG/DL (ref 70–110)
POCT GLUCOSE: 367 MG/DL (ref 70–110)
POCT GLUCOSE: 371 MG/DL (ref 70–110)
POTASSIUM BLD-SCNC: 5.2 MMOL/L (ref 3.6–5.1)
POTASSIUM SERPL-SCNC: 4 MMOL/L (ref 3.5–5.1)
POTASSIUM SERPL-SCNC: 4.4 MMOL/L (ref 3.5–5.1)
POTASSIUM SERPL-SCNC: 5 MMOL/L (ref 3.5–5.1)
PROT SERPL-MCNC: 8.7 G/DL (ref 6–8.4)
PROTEIN, POC: 8.8 G/DL (ref 6.4–8.1)
RBC # BLD AUTO: 4.85 M/UL (ref 4.6–6.2)
RBC, POC: NORMAL
RDW, POC: NORMAL
SODIUM BLD-SCNC: 140 MMOL/L (ref 128–145)
SODIUM SERPL-SCNC: 137 MMOL/L (ref 136–145)
SODIUM SERPL-SCNC: 138 MMOL/L (ref 136–145)
SODIUM SERPL-SCNC: 138 MMOL/L (ref 136–145)
WBC # BLD AUTO: 17.86 K/UL (ref 3.9–12.7)
WBC, POC: NORMAL

## 2023-03-27 PROCEDURE — 25500020 PHARM REV CODE 255: Mod: ER | Performed by: EMERGENCY MEDICINE

## 2023-03-27 PROCEDURE — 63600175 PHARM REV CODE 636 W HCPCS: Mod: ER | Performed by: EMERGENCY MEDICINE

## 2023-03-27 PROCEDURE — 80053 COMPREHEN METABOLIC PANEL: CPT | Mod: ER

## 2023-03-27 PROCEDURE — 99285 EMERGENCY DEPT VISIT HI MDM: CPT | Mod: 25

## 2023-03-27 PROCEDURE — 96365 THER/PROPH/DIAG IV INF INIT: CPT

## 2023-03-27 PROCEDURE — S5010 5% DEXTROSE AND 0.45% SALINE: HCPCS | Performed by: HOSPITALIST

## 2023-03-27 PROCEDURE — 80048 BASIC METABOLIC PNL TOTAL CA: CPT | Mod: 91,XB | Performed by: HOSPITALIST

## 2023-03-27 PROCEDURE — 85025 COMPLETE CBC W/AUTO DIFF WBC: CPT | Performed by: HOSPITALIST

## 2023-03-27 PROCEDURE — 82962 GLUCOSE BLOOD TEST: CPT

## 2023-03-27 PROCEDURE — 85025 COMPLETE CBC W/AUTO DIFF WBC: CPT | Mod: ER

## 2023-03-27 PROCEDURE — 99222 1ST HOSP IP/OBS MODERATE 55: CPT | Mod: ,,, | Performed by: OTOLARYNGOLOGY

## 2023-03-27 PROCEDURE — 82010 KETONE BODYS QUAN: CPT | Performed by: HOSPITALIST

## 2023-03-27 PROCEDURE — 25000003 PHARM REV CODE 250: Performed by: HOSPITALIST

## 2023-03-27 PROCEDURE — 96375 TX/PRO/DX INJ NEW DRUG ADDON: CPT

## 2023-03-27 PROCEDURE — 80053 COMPREHEN METABOLIC PANEL: CPT | Performed by: HOSPITALIST

## 2023-03-27 PROCEDURE — 20000000 HC ICU ROOM

## 2023-03-27 PROCEDURE — 99222 PR INITIAL HOSPITAL CARE,LEVL II: ICD-10-PCS | Mod: ,,, | Performed by: OTOLARYNGOLOGY

## 2023-03-27 PROCEDURE — 83605 ASSAY OF LACTIC ACID: CPT | Performed by: HOSPITALIST

## 2023-03-27 PROCEDURE — 96361 HYDRATE IV INFUSION ADD-ON: CPT

## 2023-03-27 PROCEDURE — 25000003 PHARM REV CODE 250: Mod: ER | Performed by: EMERGENCY MEDICINE

## 2023-03-27 PROCEDURE — A4216 STERILE WATER/SALINE, 10 ML: HCPCS | Performed by: HOSPITALIST

## 2023-03-27 PROCEDURE — 36415 COLL VENOUS BLD VENIPUNCTURE: CPT | Performed by: HOSPITALIST

## 2023-03-27 PROCEDURE — 83036 HEMOGLOBIN GLYCOSYLATED A1C: CPT | Performed by: HOSPITALIST

## 2023-03-27 RX ORDER — SODIUM CHLORIDE, SODIUM LACTATE, POTASSIUM CHLORIDE, CALCIUM CHLORIDE 600; 310; 30; 20 MG/100ML; MG/100ML; MG/100ML; MG/100ML
INJECTION, SOLUTION INTRAVENOUS CONTINUOUS
Status: DISCONTINUED | OUTPATIENT
Start: 2023-03-27 | End: 2023-03-27

## 2023-03-27 RX ORDER — SODIUM CHLORIDE 0.9 % (FLUSH) 0.9 %
10 SYRINGE (ML) INJECTION
Status: DISCONTINUED | OUTPATIENT
Start: 2023-03-27 | End: 2023-03-30 | Stop reason: HOSPADM

## 2023-03-27 RX ORDER — INSULIN ASPART 100 [IU]/ML
0-5 INJECTION, SOLUTION INTRAVENOUS; SUBCUTANEOUS
Status: DISCONTINUED | OUTPATIENT
Start: 2023-03-27 | End: 2023-03-27

## 2023-03-27 RX ORDER — SODIUM CHLORIDE 9 MG/ML
1000 INJECTION, SOLUTION INTRAVENOUS
Status: COMPLETED | OUTPATIENT
Start: 2023-03-27 | End: 2023-03-27

## 2023-03-27 RX ORDER — CLINDAMYCIN PHOSPHATE 600 MG/50ML
600 INJECTION, SOLUTION INTRAVENOUS
Status: COMPLETED | OUTPATIENT
Start: 2023-03-27 | End: 2023-03-27

## 2023-03-27 RX ORDER — GLUCAGON 1 MG
1 KIT INJECTION
Status: DISCONTINUED | OUTPATIENT
Start: 2023-03-27 | End: 2023-03-30 | Stop reason: HOSPADM

## 2023-03-27 RX ORDER — DEXAMETHASONE SODIUM PHOSPHATE 100 MG/10ML
10 INJECTION INTRAMUSCULAR; INTRAVENOUS
Status: COMPLETED | OUTPATIENT
Start: 2023-03-27 | End: 2023-03-27

## 2023-03-27 RX ORDER — INSULIN ASPART 100 [IU]/ML
15 INJECTION, SOLUTION INTRAVENOUS; SUBCUTANEOUS
Status: DISCONTINUED | OUTPATIENT
Start: 2023-03-27 | End: 2023-03-27

## 2023-03-27 RX ORDER — LISINOPRIL 2.5 MG/1
2.5 TABLET ORAL DAILY
Status: DISCONTINUED | OUTPATIENT
Start: 2023-03-27 | End: 2023-03-30 | Stop reason: HOSPADM

## 2023-03-27 RX ORDER — AMOXICILLIN 250 MG
1 CAPSULE ORAL 2 TIMES DAILY
Status: DISCONTINUED | OUTPATIENT
Start: 2023-03-27 | End: 2023-03-30 | Stop reason: HOSPADM

## 2023-03-27 RX ORDER — DEXTROSE 40 %
30 GEL (GRAM) ORAL
Status: DISCONTINUED | OUTPATIENT
Start: 2023-03-27 | End: 2023-03-28

## 2023-03-27 RX ORDER — CLINDAMYCIN PHOSPHATE 150 MG/ML
600 INJECTION, SOLUTION INTRAVENOUS
Status: DISCONTINUED | OUTPATIENT
Start: 2023-03-27 | End: 2023-03-27

## 2023-03-27 RX ORDER — SODIUM CHLORIDE 0.9 % (FLUSH) 0.9 %
10 SYRINGE (ML) INJECTION EVERY 8 HOURS
Status: DISCONTINUED | OUTPATIENT
Start: 2023-03-27 | End: 2023-03-30 | Stop reason: HOSPADM

## 2023-03-27 RX ORDER — ATORVASTATIN CALCIUM 40 MG/1
40 TABLET, FILM COATED ORAL DAILY
Status: DISCONTINUED | OUTPATIENT
Start: 2023-03-27 | End: 2023-03-30 | Stop reason: HOSPADM

## 2023-03-27 RX ORDER — DEXTROSE MONOHYDRATE AND SODIUM CHLORIDE 5; .45 G/100ML; G/100ML
125 INJECTION, SOLUTION INTRAVENOUS CONTINUOUS PRN
Status: DISCONTINUED | OUTPATIENT
Start: 2023-03-27 | End: 2023-03-28

## 2023-03-27 RX ORDER — SODIUM CHLORIDE 9 MG/ML
125 INJECTION, SOLUTION INTRAVENOUS CONTINUOUS
Status: DISCONTINUED | OUTPATIENT
Start: 2023-03-27 | End: 2023-03-28

## 2023-03-27 RX ORDER — NALOXONE HCL 0.4 MG/ML
0.02 VIAL (ML) INJECTION
Status: DISCONTINUED | OUTPATIENT
Start: 2023-03-27 | End: 2023-03-30 | Stop reason: HOSPADM

## 2023-03-27 RX ORDER — ONDANSETRON 2 MG/ML
4 INJECTION INTRAMUSCULAR; INTRAVENOUS EVERY 6 HOURS PRN
Status: DISCONTINUED | OUTPATIENT
Start: 2023-03-27 | End: 2023-03-30 | Stop reason: HOSPADM

## 2023-03-27 RX ORDER — TALC
6 POWDER (GRAM) TOPICAL NIGHTLY PRN
Status: DISCONTINUED | OUTPATIENT
Start: 2023-03-27 | End: 2023-03-30 | Stop reason: HOSPADM

## 2023-03-27 RX ORDER — CLINDAMYCIN PHOSPHATE 600 MG/50ML
600 INJECTION, SOLUTION INTRAVENOUS
Status: DISCONTINUED | OUTPATIENT
Start: 2023-03-27 | End: 2023-03-28 | Stop reason: CLARIF

## 2023-03-27 RX ORDER — PROCHLORPERAZINE EDISYLATE 5 MG/ML
5 INJECTION INTRAMUSCULAR; INTRAVENOUS EVERY 6 HOURS PRN
Status: DISCONTINUED | OUTPATIENT
Start: 2023-03-27 | End: 2023-03-30 | Stop reason: HOSPADM

## 2023-03-27 RX ORDER — DEXTROSE 40 %
15 GEL (GRAM) ORAL
Status: DISCONTINUED | OUTPATIENT
Start: 2023-03-27 | End: 2023-03-28

## 2023-03-27 RX ORDER — ACETAMINOPHEN 325 MG/1
650 TABLET ORAL EVERY 4 HOURS PRN
Status: DISCONTINUED | OUTPATIENT
Start: 2023-03-27 | End: 2023-03-30 | Stop reason: HOSPADM

## 2023-03-27 RX ADMIN — SODIUM CHLORIDE 1000 ML: 9 INJECTION, SOLUTION INTRAVENOUS at 08:03

## 2023-03-27 RX ADMIN — CLINDAMYCIN IN 5 PERCENT DEXTROSE 600 MG: 12 INJECTION, SOLUTION INTRAVENOUS at 08:03

## 2023-03-27 RX ADMIN — CLINDAMYCIN IN 5 PERCENT DEXTROSE 600 MG: 12 INJECTION, SOLUTION INTRAVENOUS at 07:03

## 2023-03-27 RX ADMIN — LISINOPRIL 2.5 MG: 2.5 TABLET ORAL at 06:03

## 2023-03-27 RX ADMIN — SODIUM CHLORIDE 125 ML/HR: 9 INJECTION, SOLUTION INTRAVENOUS at 06:03

## 2023-03-27 RX ADMIN — SENNOSIDES AND DOCUSATE SODIUM 1 TABLET: 50; 8.6 TABLET ORAL at 09:03

## 2023-03-27 RX ADMIN — INSULIN HUMAN 4 UNITS: 100 INJECTION, SOLUTION PARENTERAL at 12:03

## 2023-03-27 RX ADMIN — SODIUM CHLORIDE 1000 ML: 9 INJECTION, SOLUTION INTRAVENOUS at 10:03

## 2023-03-27 RX ADMIN — ATORVASTATIN CALCIUM 40 MG: 40 TABLET, FILM COATED ORAL at 06:03

## 2023-03-27 RX ADMIN — DEXTROSE AND SODIUM CHLORIDE 125 ML/HR: 5; 450 INJECTION, SOLUTION INTRAVENOUS at 11:03

## 2023-03-27 RX ADMIN — IOHEXOL 100 ML: 350 INJECTION, SOLUTION INTRAVENOUS at 09:03

## 2023-03-27 RX ADMIN — INSULIN HUMAN 0.1 UNITS/KG/HR: 1 INJECTION, SOLUTION INTRAVENOUS at 06:03

## 2023-03-27 RX ADMIN — Medication 10 ML: at 02:03

## 2023-03-27 RX ADMIN — SODIUM CHLORIDE 1000 ML: 9 INJECTION, SOLUTION INTRAVENOUS at 12:03

## 2023-03-27 RX ADMIN — DEXAMETHASONE SODIUM PHOSPHATE 10 MG: 10 INJECTION INTRAMUSCULAR; INTRAVENOUS at 08:03

## 2023-03-27 NOTE — ASSESSMENT & PLAN NOTE
Body mass index is 35.71 kg/m². Morbid obesity complicates all aspects of disease management from diagnostic modalities to treatment. Weight loss encouraged and health benefits explained to patient.

## 2023-03-27 NOTE — ASSESSMENT & PLAN NOTE
Patient's FSGs are uncontrolled due to hyperglycemia on current medication regimen.  Last A1c reviewed-   Lab Results   Component Value Date    HGBA1C >14.0 (H) 01/03/2023     Most recent fingerstick glucose reviewed-   Recent Labs   Lab 03/27/23  0845 03/27/23  1219   POCTGLUCOSE 367* 302*     Current correctional scale  Low  continue anti-hyperglycemic dose as follows-   Antihyperglycemics (From admission, onward)    Start     Stop Route Frequency Ordered    03/27/23 2100  insulin detemir U-100 (Levemir) pen 50 Units         -- SubQ Nightly 03/27/23 1429    03/27/23 1645  insulin aspart U-100 pen 15 Units         -- SubQ 3 times daily with meals 03/27/23 1429    03/27/23 1529  insulin aspart U-100 pen 0-5 Units         -- SubQ Before meals & nightly PRN 03/27/23 1429        Hold Oral hypoglycemics while patient is in the hospital- on metformin, trulicity also as outpatient   - repeat A1c  - check BMP and beta hydroxybutyrate now

## 2023-03-27 NOTE — PROVIDER PROGRESS NOTES - EMERGENCY DEPT.
Encounter Date: 3/27/2023    ED Physician Progress Notes        Physician Note:   I evaluated this patient upon his arrival in the emergency department.  The patient is protecting his airway.  His voice sounds normal.  He has no problem with range of motion of his neck.  He is not having any problem tolerating his secretions.  No stridor.  Patient reports feeling better since receiving steroids and antibiotics.  I have contacted Dr. Cody from ENT, who will come see the patient in the ED.    2:13 PM Dr. Cody has evaluated the patient in the emergency department.  She was unable to drain the abscess but plans to put the patient on the OR schedule for tomorrow.  Plan for continuation of clindamycin intravenously.  No additional doses of steroids recommended.  Patient may have diet as tolerated but NPO after midnight.  Will discuss this with the inpatient hospitalist.

## 2023-03-27 NOTE — SIGNIFICANT EVENT
Repeat labs reviewed.     [facility]  Lab Results   Component Value Date     03/27/2023    K 5.0 03/27/2023     03/27/2023    CO2 17 (L) 03/27/2023    BUN 12 03/27/2023    CREATININE 1.1 03/27/2023    CALCIUM 9.8 03/27/2023    ANIONGAP 19 (H) 03/27/2023    EGFRNORACEVR >60 03/27/2023     Beta hydroxybutyrate elevated. Lactic normal. He has DKA. Triggered by lack of insulin and metformin x2 days and sepsis from peritonsillar abscess.     - transfer to ICU  - started DKA protocol with insulin gtt, IVF, BMP Q4, glucose Q1h  - ok for clear no sugar liquids  - repeat A1c pending  - NPO at midnight for ENT OR tomorrow. Notified ENT.     Amrita Olson MD  03/27/2023  4:45 PM    Critical care time spent on the evaluation and treatment of severe organ dysfunction, review of pertinent labs and imaging studies, discussions with consulting providers and discussions with patient/family: 15 minutes

## 2023-03-27 NOTE — ED PROVIDER NOTES
Encounter Date: 3/27/2023    SCRIBE #1 NOTE: I, Lorena Sy, am scribing for, and in the presence of,  Alexa Rios MD. I have scribed the following portions of the note - Other sections scribed: HPI, ROS, PE.     History     Chief Complaint   Patient presents with    Facial Swelling     Pt reports R facial swelling around R jaw and R ear starting yesterday after being sick for past week, denies trouble swallowing     This is a 37 y.o. male with a PMHx of DM type 1, HLD, and HTN who presents to the Emergency Department complaining of right sided jaw pain and swelling that began 2 days ago. Patient reports being sick for the last week with these symptoms beginning 2 days ago. His pain is exacerbated with swallowing and opening his mouth. He additionally endorses sore throat, rhinorrhea, mild intermittent difficulty breathing, and subjective fever. Patient denies any dental pain, cough, or otalgia. NKDA.     The history is provided by the patient. No  was used.   Review of patient's allergies indicates:  No Known Allergies  Past Medical History:   Diagnosis Date    Diabetes     DKA (diabetic ketoacidoses)     Hyperlipidemia     Hypertension      No past surgical history on file.  Family History   Problem Relation Age of Onset    Diabetes Mother      Social History     Tobacco Use    Smoking status: Never    Smokeless tobacco: Never   Substance Use Topics    Alcohol use: No     Alcohol/week: 0.0 standard drinks    Drug use: No     Review of Systems   Constitutional:  Positive for fever.   HENT:  Positive for facial swelling (R jaw), rhinorrhea and sore throat. Negative for dental problem and ear pain.         (+) Jaw pain.   Eyes:  Negative for pain and discharge.   Respiratory:  Positive for shortness of breath. Negative for cough and choking.    Cardiovascular:  Negative for chest pain.   Gastrointestinal:  Negative for abdominal pain, nausea and vomiting.   Genitourinary:  Negative for dysuria  and frequency.   Musculoskeletal:  Negative for back pain.   Skin:  Negative for rash.   Neurological:  Negative for weakness and numbness.     Physical Exam     Initial Vitals [03/27/23 0801]   BP Pulse Resp Temp SpO2   (!) 174/100 (!) 118 20 99.8 °F (37.7 °C) 96 %      MAP       --         Physical Exam    Nursing note and vitals reviewed.  Constitutional: He is not diaphoretic. No distress.   HENT:   Head: Normocephalic and atraumatic.   Trismus. R cervical adenopathy. Fullness of soft palate, right greater than left.    Eyes: Conjunctivae and EOM are normal. No scleral icterus.   Neck: Neck supple. No tracheal deviation present.   Normal range of motion.  Cardiovascular:  Normal rate, regular rhythm and intact distal pulses.           Pulmonary/Chest: No stridor. No respiratory distress.   Abdominal: Abdomen is soft. He exhibits no distension. There is no abdominal tenderness.   Musculoskeletal:         General: No tenderness or edema.      Cervical back: Normal range of motion and neck supple.     Neurological: He is alert. He has normal strength. No cranial nerve deficit or sensory deficit.   Skin: Skin is warm and dry.   Psychiatric: He has a normal mood and affect.       ED Course   Procedures  Labs Reviewed   POCT GLUCOSE - Abnormal; Notable for the following components:       Result Value    POCT Glucose 367 (*)     All other components within normal limits   POCT CMP - Abnormal; Notable for the following components:    Alkaline Phosphatase,  (*)     POC Glucose 404 (*)     POC Potassium 5.2 (*)     Protein, POC 8.8 (*)     All other components within normal limits   POCT CBC   POCT GLUCOSE MONITORING CONTINUOUS   POCT CMP            Imaging Results               CT Maxillofacial With Contrast (Final result)  Result time 03/27/23 09:57:11      Final result by Michael Lawson DO (03/27/23 09:57:11)                   Impression:      Enlargement right palatine tonsil with prominent edema extending  along the right oropharynx to hypopharynx.  There is peripheral enhancing collection along the ventral aspect of the right palatine tonsil, configuration concerning for pharyngitis and para tonsillar abscess formation.  Clinical correlation and ENT evaluation recommended.    Mass effect on the airway although remains grossly patent    Prominent to enlarged cervical lymph nodes likely reactive.  Clinical correlation and follow-up recommended.    Please see above for additional details.      Electronically signed by: Michael Lawson DO  Date:    03/27/2023  Time:    09:57               Narrative:    EXAMINATION:  CT SOFT TISSUE NECK WITH CONTRAST; CT MAXILLOFACIAL WITH CONTRAST    CLINICAL HISTORY:  Epiglottitis or tonsillitis suspected;Neck abscess, deep tissue;; Maxillary/facial abscess;Sublingual/submandibular abscess;    TECHNIQUE:  CT neck: Low dose axial images as well as sagittal and coronal reconstructions were performed from the skull base to the clavicles following intravenous contrast administration    CT face: 2.5 mm axial images of the maxillofacial bones from superior frontal calvarium to submandibular level postcontrast with coronal sagittal reformatted imaging from the axial acquisition.    75 mL of Omnipaque 350 was infused intravenously    COMPARISON:  None    FINDINGS:  There is diffuse enlargement right palatine tonsil with heterogeneous predominantly peripheral enhancing collection along the medial ventral aspect measuring 2.0 by 1.4 by 1.5 cm in AP by TV by CC dimensions respectively concerning for pharyngitis with peritonsillar abscess.  There is hypoattenuation concerning for edema extending from this inferiorly to the level of the piriform sinus with additional edema in the right area epiglottic fold.  There is slight mass effect on the sravanthi pharyngeal airway although it is patent.  There is questionable trace edema and thickening along the right aspect of the epiglottis superior  laterally.    There is hypoattenuation and probable fluid extending along the prevertebral space.    Scattered prominent lymph nodes within the neck largest right jugular digastric group measuring 1.6 cm likely reactive.  Clinical correlation and follow-up advised    Glands: No focal parotid, submandibular or thyroid gland lesion    No consolidation visualized lungs    There is question bilateral globe proptosis.  No evidence for intra or extraconal orbital mass.  Bony orbits are intact.    Slight reverse S shaped deviation of the nasal septum.  No significant paranasal sinus opacification.  Trace right maxillary mucosal thickening.    Enhancing vasculature throughout the neck and visualized intracranially is grossly patent.    This report was flagged in Epic as abnormal.                                        CT Soft Tissue Neck With Contrast (Final result)  Result time 03/27/23 09:57:11      Final result by Michael Lawson DO (03/27/23 09:57:11)                   Impression:      Enlargement right palatine tonsil with prominent edema extending along the right oropharynx to hypopharynx.  There is peripheral enhancing collection along the ventral aspect of the right palatine tonsil, configuration concerning for pharyngitis and para tonsillar abscess formation.  Clinical correlation and ENT evaluation recommended.    Mass effect on the airway although remains grossly patent    Prominent to enlarged cervical lymph nodes likely reactive.  Clinical correlation and follow-up recommended.    Please see above for additional details.      Electronically signed by: Michael Lawson DO  Date:    03/27/2023  Time:    09:57               Narrative:    EXAMINATION:  CT SOFT TISSUE NECK WITH CONTRAST; CT MAXILLOFACIAL WITH CONTRAST    CLINICAL HISTORY:  Epiglottitis or tonsillitis suspected;Neck abscess, deep tissue;; Maxillary/facial abscess;Sublingual/submandibular abscess;    TECHNIQUE:  CT neck: Low dose axial images as well as  sagittal and coronal reconstructions were performed from the skull base to the clavicles following intravenous contrast administration    CT face: 2.5 mm axial images of the maxillofacial bones from superior frontal calvarium to submandibular level postcontrast with coronal sagittal reformatted imaging from the axial acquisition.    75 mL of Omnipaque 350 was infused intravenously    COMPARISON:  None    FINDINGS:  There is diffuse enlargement right palatine tonsil with heterogeneous predominantly peripheral enhancing collection along the medial ventral aspect measuring 2.0 by 1.4 by 1.5 cm in AP by TV by CC dimensions respectively concerning for pharyngitis with peritonsillar abscess.  There is hypoattenuation concerning for edema extending from this inferiorly to the level of the piriform sinus with additional edema in the right area epiglottic fold.  There is slight mass effect on the sravanthi pharyngeal airway although it is patent.  There is questionable trace edema and thickening along the right aspect of the epiglottis superior laterally.    There is hypoattenuation and probable fluid extending along the prevertebral space.    Scattered prominent lymph nodes within the neck largest right jugular digastric group measuring 1.6 cm likely reactive.  Clinical correlation and follow-up advised    Glands: No focal parotid, submandibular or thyroid gland lesion    No consolidation visualized lungs    There is question bilateral globe proptosis.  No evidence for intra or extraconal orbital mass.  Bony orbits are intact.    Slight reverse S shaped deviation of the nasal septum.  No significant paranasal sinus opacification.  Trace right maxillary mucosal thickening.    Enhancing vasculature throughout the neck and visualized intracranially is grossly patent.    This report was flagged in Epic as abnormal.                                       Medications   dexAMETHasone injection 10 mg (10 mg Intravenous Given 3/27/23 0851)    clindamycin in D5W 600 mg/50 mL IVPB 600 mg (0 mg Intravenous Stopped 3/27/23 0917)   0.9%  NaCl infusion (0 mLs Intravenous Stopped 3/27/23 1012)   iohexoL (OMNIPAQUE 350) injection 100 mL (100 mLs Intravenous Given 3/27/23 0926)   0.9%  NaCl infusion (0 mLs Intravenous Stopped 3/27/23 1123)     Medical Decision Making:   History:   Old Medical Records: I decided to obtain old medical records.  Differential Diagnosis:   Includes but is not limited to: Peritonsillar abscess, Tonsillar cellulitis, Retropharyngeal abscess, Gallo's angina, Dental infection  Clinical Tests:   Lab Tests: Ordered and Reviewed  Radiological Study: Ordered and Reviewed        Scribe Attestation:   Scribe #1: I performed the above scribed service and the documentation accurately describes the services I performed. I attest to the accuracy of the note.      ED Course as of 03/27/23 1124   Mon Mar 27, 2023   1115 Patient is afebrile but has some tachycardia and trismus.  He is protecting his airway and speaking in full sentences.  He is not stridorous or drooling.  Symptoms concerning for peritonsillar abscess.  Patient given IV Decadron, clindamycin.  Labs notable for leukocytosis.  Patient is hyperglycemic but does not have significant anion gap to suggest DKA.  Patient started on IV hydration in the emergency department.  CT scan demonstrates right peritonsillar abscess.  There is also some hypoattenuation in the prevertebral areas suggesting fluid.     Consult: I have spoken with Dr. Cody from the ENT service regarding the patient's presentation and study results.  At this time, the recommendation is transferred to [facility] for ENT evaluation.  Give IV Decadron, IV clinda.    On reassessment after IV hydration patient has resolution of tachycardia.  He remains without stridor or drooling and is protecting his airway.  He is to be transferred to [facility] for further management.    Please  put in 60 minutes of critical care due to patient having a high risk of respiratory failure.   Separate from teaching and exclusive of procedure and ekg time  Includes:  Time at bedside  Time reviewing test results  Time discussing case with staff  Time documenting the medical record  Time spent with family members  Time spent with consults  Management  [SG]      ED Course User Index  [SG] Alexa Rios MD          This chart was completed using dictation software, as a result there may be some transcription errors.      I, Alexa Rios , personally performed the services described in this documentation.  All medical record entries made by the scribe were at my direction and in my presence.  I have reviewed the chart and agree that the record reflects my personal performance and is accurate and complete.  Clinical Impression:   Final diagnoses:  [J36] Peritonsillar abscess (Primary)  [R73.9] Hyperglycemia        ED Disposition Condition    Transfer to Another Facility Stable                Alexa Rios MD  03/27/23 8606

## 2023-03-27 NOTE — ASSESSMENT & PLAN NOTE
CT showing enlarged R palatine tonsil with edema and concern for pharyngitis and peritonsillar abscess. Airway patent. Reactive cervical nodes. DM is a risk factor.   - ENT consulted  - continue clindamycin  - to OR tomorrow, NPO at midnight

## 2023-03-27 NOTE — H&P
[facility]  History & Physical    Patient Name: Fco Zafar III  MRN: [id number]  Patient Class: IP- Inpatient  Admission Date: 3/27/2023  Attending Physician: Amrita Olson MD   Primary Care Provider: Nirav Lanza MD         Patient information was obtained from patient, spouse/SO, past medical records and ER records.     Subjective:     Principal Problem:Peritonsillar abscess    Chief Complaint:   Chief Complaint   Patient presents with    Facial Swelling     Pt reports R facial swelling around R jaw and R ear starting yesterday after being sick for past week, denies trouble swallowing        HPI: Mr Fco Zafar III is a 37 y.o. man with DM admitted with 3 days of throat swelling and pain. No fevers. Some change to voice. Able to tolerate PO but decreased appetite. No shortness of breath. No nausea/vomiting. No drooling. No dental pain. No prior mouth surgeries.     Presented to [place]. Diagnosed with peritonsillar abscess. ENT unable to drain at bedside. Also noted hyperglycemia glucose in 400s. He says he has not taken insulin or metformin in last 2 days.       Past Medical History:   Diagnosis Date    Diabetes     DKA (diabetic ketoacidoses)     Hyperlipidemia     Hypertension        History reviewed. No pertinent surgical history.    Review of patient's allergies indicates:  No Known Allergies    No current facility-administered medications on file prior to encounter.     Current Outpatient Medications on File Prior to Encounter   Medication Sig    atorvastatin (LIPITOR) 40 MG tablet Take 1 tablet (40 mg total) by mouth once daily.    blood-glucose sensor (FREESTYLE MARGIE 3 SENSOR) Mabel Change every 14 days    ciclopirox (PENLAC) 8 % Soln Apply topically nightly.    diclofenac sodium (VOLTAREN) 1 % Gel Apply 2 g topically once daily.    dulaglutide (TRULICITY) 1.5 mg/0.5 mL pen injector Inject 1.5 mg into the skin every 7 days. (Patient taking  "differently: Inject 1.5 mg into the skin every 7 days. Mondays)    glucagon (BAQSIMI) 3 mg/actuation Spry Give one puff via nostril. Hold device between fingers and thumb, do not push plunger yet, insert tip gently into one nostril until finger(s) touch the outside of the nose, then push plunger firmly all the way in . Dose is complete when the green line disappears.    insulin aspart U-100 (NOVOLOG FLEXPEN U-100 INSULIN) 100 unit/mL (3 mL) InPn pen Inject 15 units before meals plus scale 150-200+2, 201-250+4, 251-300+6, 301-350+8, >350+10. Max daily 75 units.    lisinopriL (PRINIVIL,ZESTRIL) 2.5 MG tablet Take 1 tablet (2.5 mg total) by mouth once daily.    metFORMIN (GLUCOPHAGE-XR) 500 MG ER 24hr tablet Take 1 tablet (500 mg total) by mouth 2 (two) times daily with meals.    MICRO THIN LANCETS 33 gauge Misc     pen needle, diabetic (BD AMELIA 2ND GEN PEN NEEDLE) 32 gauge x 5/32" Ndle Use 4x a day  e 10.65    ciclopirox (LOPROX) 0.77 % Crea Apply topically 2 (two) times daily.    insulin degludec (TRESIBA FLEXTOUCH U-100) 100 unit/mL (3 mL) insulin pen Inject 50 units at night. (Patient not taking: Reported on 3/27/2023)    [DISCONTINUED] insulin glargine (LANTUS U-100 INSULIN) 100 unit/mL injection Inject 30 Units into the skin 2 (two) times a day.    [DISCONTINUED] sildenafiL (VIAGRA) 100 MG tablet Take 1 tablet (100 mg total) by mouth daily as needed for Erectile Dysfunction.     Family History       Problem Relation (Age of Onset)    Diabetes Mother          Tobacco Use    Smoking status: Never    Smokeless tobacco: Never   Substance and Sexual Activity    Alcohol use: No     Alcohol/week: 0.0 standard drinks    Drug use: No    Sexual activity: Yes     Partners: Female     Comment: Wife     Review of Systems   Constitutional:  Positive for appetite change. Negative for activity change, chills, fatigue and fever.   HENT:  Positive for sore throat and voice change. Negative for congestion, facial " swelling, hearing loss, postnasal drip, rhinorrhea, sinus pressure, sinus pain and trouble swallowing.    Respiratory:  Negative for cough and shortness of breath.    Cardiovascular:  Negative for chest pain, palpitations and leg swelling.   Gastrointestinal:  Negative for abdominal distention, abdominal pain, constipation, diarrhea, nausea and vomiting.   Genitourinary:  Negative for difficulty urinating.   Musculoskeletal:  Negative for arthralgias and myalgias.   Neurological:  Negative for facial asymmetry, weakness and numbness.   Psychiatric/Behavioral:  Negative for confusion.    Objective:     Vital Signs (Most Recent):  Temp: 99.8 °F (37.7 °C) (03/27/23 0801)  Pulse: 97 (03/27/23 1216)  Resp: 17 (03/27/23 1216)  BP: 137/79 (03/27/23 1216)  SpO2: 95 % (03/27/23 1216) Vital Signs (24h Range):  Temp:  [99.8 °F (37.7 °C)] 99.8 °F (37.7 °C)  Pulse:  [] 97  Resp:  [17-20] 17  SpO2:  [94 %-96 %] 95 %  BP: (137-174)/() 137/79     Weight: 103.4 kg (228 lb)  Body mass index is 35.71 kg/m².    Physical Exam  Vitals and nursing note reviewed.   Constitutional:       General: He is not in acute distress.     Appearance: He is obese. He is ill-appearing. He is not toxic-appearing.   HENT:      Head: Normocephalic and atraumatic.      Nose: Nose normal.      Mouth/Throat:      Mouth: Mucous membranes are dry.      Comments: Swelling R oropharynx greater than L. Unable to see tonsils   Eyes:      General: No scleral icterus.     Conjunctiva/sclera: Conjunctivae normal.   Cardiovascular:      Rate and Rhythm: Regular rhythm. Tachycardia present.      Pulses: Normal pulses.      Heart sounds: Normal heart sounds.   Pulmonary:      Effort: Pulmonary effort is normal.      Breath sounds: Normal breath sounds. No stridor.      Comments: Room air  Abdominal:      General: Bowel sounds are normal.      Palpations: Abdomen is soft.   Musculoskeletal:      Right lower leg: No edema.      Left lower leg: No edema.    Skin:     General: Skin is warm and dry.   Neurological:      Mental Status: He is alert and oriented to person, place, and time. Mental status is at baseline.           Significant Labs: All pertinent labs within the past 24 hours have been reviewed.    Significant Imaging: I have reviewed all pertinent imaging results/findings within the past 24 hours.    Assessment/Plan:     * Peritonsillar abscess  CT showing enlarged R palatine tonsil with edema and concern for pharyngitis and peritonsillar abscess. Airway patent. Reactive cervical nodes. DM is a risk factor.   - ENT consulted  - continue clindamycin  - to OR tomorrow, NPO at midnight       Sepsis  This patient does have evidence of infective focus  My overall impression is sepsis.  Source: peritonsillar abscess and pharyngitis  Antibiotics given-   Antibiotics (72h ago, onward)    Start     Stop Route Frequency Ordered    03/27/23 1600  clindamycin in D5W 600 mg/50 mL IVPB 600 mg         -- IV Every 8 hours (non-standard times) 03/27/23 1440        Latest lactate reviewed- pending   Organ dysfunction indicated by none    Fluid challenge Actual Body weight- Patient will receive 30ml/kg actual body weight to calculate fluid bolus for treatment of septic shock. Received 3L NS. Post- resuscitation assessment Yes Perfusion exam was performed within 6 hours of septic shock presentation after bolus shows Adequate tissue perfusion assessed by non-invasive monitoring     Source control achieved by: antibiotics, ENT consult, OR tomorrow     Obesity, diabetes, and hypertension syndrome  Body mass index is 35.71 kg/m². Morbid obesity complicates all aspects of disease management from diagnostic modalities to treatment. Weight loss encouraged and health benefits explained to patient.         Hypertension associated with diabetes  BP elevated on admit, improving  Continue lisinopril     Hyperlipidemia associated with type 2 diabetes mellitus  Last lipid panel 1/2022  uncontrolled  Continue statin   Repeat lipid panel as outpatient    HILDA (latent autoimmune diabetes in adults), managed as type 1  Patient's FSGs are uncontrolled due to hyperglycemia on current medication regimen.  Last A1c reviewed-   Lab Results   Component Value Date    HGBA1C >14.0 (H) 01/03/2023     Most recent fingerstick glucose reviewed-   Recent Labs   Lab 03/27/23  0845 03/27/23  1219   POCTGLUCOSE 367* 302*     Current correctional scale  Low  continue anti-hyperglycemic dose as follows-   Antihyperglycemics (From admission, onward)    Start     Stop Route Frequency Ordered    03/27/23 2100  insulin detemir U-100 (Levemir) pen 50 Units         -- SubQ Nightly 03/27/23 1429    03/27/23 1645  insulin aspart U-100 pen 15 Units         -- SubQ 3 times daily with meals 03/27/23 1429    03/27/23 1529  insulin aspart U-100 pen 0-5 Units         -- SubQ Before meals & nightly PRN 03/27/23 1429        Hold Oral hypoglycemics while patient is in the hospital- on metformin, trulicity also as outpatient   - repeat A1c  - check BMP and beta hydroxybutyrate now      VTE Risk Mitigation (From admission, onward)         Ordered     Place THIERNO hose  Until discontinued         03/27/23 1429     Place sequential compression device  Until discontinued         03/27/23 1429     Reason for No Pharmacological VTE Prophylaxis  Once        Question:  Reasons:  Answer:  Physician Provided (leave comment)  Comment:  going to OR tomorrow    03/27/23 1429     IP VTE HIGH RISK PATIENT  Once         03/27/23 1429                   Plan discussed with patient and wife at bedside.         Amrita Olson MD  Department of Hospital Medicine  [facility] [place] - Emergency Dept

## 2023-03-27 NOTE — CONSULTS
[facility] Emergency Dept  Otorhinolaryngology-Head & Neck Surgery  Consult Note    Patient Name: Fco Zafar III  MRN: [id number]  Code Status: Full Code  Admission Date: 3/27/2023  Hospital Length of Stay: 0 days  Attending Physician: Amrita Olson MD  Primary Care Provider: Nirav Lanza MD    Consults  Unable to needle aspirate pus, had significant gag reflex and difficulty with tolerating procedure. Either phlegmon and will improve with iv antibiotics . Will add onto the schedule for the OR for tomorrow because if not improving will attempt in OR so that I can have a better chance to drain the area since he had difficulty tolerating procedure under local anesthesia. Have low threshold for this given edema vs phlegmon extending into parapharyngeal space   NPO after midnight   Hold additional steroid to not mask symptoms, can add decadron 10 mg iv q8hr if symptoms not improving with abx alone but would like to try to hold off on additional steroids if possible  Subjective:     Chief Complaint/Reason for Admission: peritonsillar abscess with paraphyarngeal space extension     History of Present Illness: throat pain has worsened over past couple of days. No trouble breathing. Voice muffled and difficulty opening mouth but has improved since got steroid and abx. No prior tonsil abscess.     Medications:  Continuous Infusions:  Scheduled Meds:   atorvastatin  40 mg Oral Daily    clindamycin (CLEOCIN) IVPB  600 mg Intravenous Q8H    insulin aspart U-100  15 Units Subcutaneous TIDWM    insulin detemir U-100 (Levemir)  50 Units Subcutaneous QHS    lisinopriL  2.5 mg Oral Daily    senna-docusate 8.6-50 mg  1 tablet Oral BID    sodium chloride 0.9%  10 mL Intravenous Q8H     PRN Meds:acetaminophen, dextrose 10%, dextrose 10%, dextrose, dextrose, glucagon (human recombinant), insulin aspart U-100, melatonin, naloxone, ondansetron, prochlorperazine     No current facility-administered medications on file prior to  "encounter.     Current Outpatient Medications on File Prior to Encounter   Medication Sig    atorvastatin (LIPITOR) 40 MG tablet Take 1 tablet (40 mg total) by mouth once daily.    blood-glucose sensor (FREESTYLE MARGIE 3 SENSOR) Mabel Change every 14 days    ciclopirox (LOPROX) 0.77 % Crea Apply topically 2 (two) times daily.    ciclopirox (PENLAC) 8 % Soln Apply topically nightly.    diclofenac sodium (VOLTAREN) 1 % Gel Apply 2 g topically once daily.    dulaglutide (TRULICITY) 1.5 mg/0.5 mL pen injector Inject 1.5 mg into the skin every 7 days.    glucagon (BAQSIMI) 3 mg/actuation Spry Give one puff via nostril. Hold device between fingers and thumb, do not push plunger yet, insert tip gently into one nostril until finger(s) touch the outside of the nose, then push plunger firmly all the way in . Dose is complete when the green line disappears.    insulin aspart U-100 (NOVOLOG FLEXPEN U-100 INSULIN) 100 unit/mL (3 mL) InPn pen Inject 15 units before meals plus scale 150-200+2, 201-250+4, 251-300+6, 301-350+8, >350+10. Max daily 75 units.    insulin degludec (TRESIBA FLEXTOUCH U-100) 100 unit/mL (3 mL) insulin pen Inject 50 units at night.    lisinopriL (PRINIVIL,ZESTRIL) 2.5 MG tablet Take 1 tablet (2.5 mg total) by mouth once daily.    metFORMIN (GLUCOPHAGE-XR) 500 MG ER 24hr tablet Take 1 tablet (500 mg total) by mouth 2 (two) times daily with meals.    MICRO THIN LANCETS 33 gauge Misc     pen needle, diabetic (BD AMELIA 2ND GEN PEN NEEDLE) 32 gauge x 5/32" Ndle Use 4x a day  e 10.65    sildenafiL (VIAGRA) 100 MG tablet Take 1 tablet (100 mg total) by mouth daily as needed for Erectile Dysfunction.    [DISCONTINUED] insulin glargine (LANTUS U-100 INSULIN) 100 unit/mL injection Inject 30 Units into the skin 2 (two) times a day.       Review of patient's allergies indicates:  No Known Allergies    Past Medical History:   Diagnosis Date    Diabetes     DKA (diabetic ketoacidoses)     Hyperlipidemia     Hypertension  "     History reviewed. No pertinent surgical history.  Family History       Problem Relation (Age of Onset)    Diabetes Mother          Tobacco Use    Smoking status: Never    Smokeless tobacco: Never   Substance and Sexual Activity    Alcohol use: No     Alcohol/week: 0.0 standard drinks    Drug use: No    Sexual activity: Yes     Partners: Female     Comment: Wife     Review of Systems   HENT:  Positive for sore throat.    Respiratory:  Negative for shortness of breath.    Cardiovascular:  Negative for chest pain.   Gastrointestinal:  Negative for abdominal pain.   Neurological:  Negative for headaches.   Hematological:  Negative for adenopathy.   Objective:     Vital Signs (Most Recent):  Temp: 99.8 °F (37.7 °C) (03/27/23 0801)  Pulse: 97 (03/27/23 1216)  Resp: 17 (03/27/23 1216)  BP: 137/79 (03/27/23 1216)  SpO2: 95 % (03/27/23 1216) Vital Signs (24h Range):  Temp:  [99.8 °F (37.7 °C)] 99.8 °F (37.7 °C)  Pulse:  [] 97  Resp:  [17-20] 17  SpO2:  [94 %-96 %] 95 %  BP: (137-174)/() 137/79     Weight: 103.4 kg (228 lb)  Body mass index is 35.71 kg/m².    Date 03/27/23 0700 - 03/28/23 0659   Shift 7816-7032 5622-8844 3074-5482 24 Hour Total   INTAKE   I.V.(mL/kg) 2000(19.3) 1000(9.7)  3000(29)   IV Piggyback 44.8   44.8   Shift Total(mL/kg) 2044.8(19.8) 1000(9.7)  3044.8(29.4)   OUTPUT   Shift Total(mL/kg)       Weight (kg) 103.4 103.4 103.4 103.4       Physical Exam  HENT:      Head: Normocephalic.      Nose: Nose normal.      Mouth/Throat:      Pharynx: Uvula swelling present.      Tonsils: 2+ on the right. 2+ on the left.        Comments: No floor of mouth swelling. Tongue mobile  Pulmonary:      Effort: Pulmonary effort is normal.      Breath sounds: No stridor.      Comments: No stertor  Musculoskeletal:      Cervical back: Full passive range of motion without pain.   Lymphadenopathy:      Cervical:      Left cervical: Deep cervical adenopathy: shotty right lad.   Neurological:      Mental Status: He  is alert.       PROCEDURE NOTE  NAME OF PROCEDURE: Flexible Laryngoscopy, diagnostic  INDICATIONS: gag reflex precludes mirror exam, throat pain in adult ; evaluate airway given findings on ct scan and concern for RPA  FINDINGS: edema and bulging of lateral pharyngeal wall on right side, larynx visualized easily, no swelling of epiglottis or a-e fold. No significant retropharyngeal bulging. Airway patent.     Consent: After procedure was explained in detail and all questions answered, verbal consent was obtained for performing flexible laryngoscopy.  Anesthesia: topical 4% lidocaine and neosynephrine  Procedure: With patient in seated position, the scope was inserted into the bilateral nasal passageway and advanced atraumatically into the nasopharynx to examine the following structures:  Nasal cavity: Turbinates without significant hypertrophy. no middle meatal edema. No purulent drainage.   Nasopharynx: no mass or lesion noted in nasopharynx.   Oropharynx: base of tongue without  mass or ulceration. Lingual tonsils normal in appearance  Hypopharynx: edema and bulging of lateral pharyngeal wall on right side, larynx visualized easily, no swelling of epiglottis or a-e fold. No significant retropharyngeal bulging. Airway patent.   Larynx: epiglottis normal without lesion. False vocal folds without edema/erythema/lesion. True vocal folds mobile and without lesion. no interarytenoid edema no erythema . Postcricoid region without edema no lesion   Subglottis: visualized portion of subglottis normal in appearance    After examination performed, the scope was removed atraumatically . The patient tolerated the procedure well.    Incision and drainage of peritonsillar abscess.    Sedation: None  Anesthetic: Topical 4% lidocaine then infiltrate with local anesthetic of 1% lidocaine with 1:100,000 epinephrine.  Side: Right     Specimens:  No purulent material from site of  right suspected peritonsillar abscess therefore formal  incision and drainage was not performed.    Indications:   Given the findings on examination today consistent with likely peritonsillar abscess fluid accumulation on the right side, incision and drainage of peritonsillar abscess is indicated. This is also in consideration of the >24 hour long trial of antibiotic therapy which the patient has received already for this condition, without significant improvement/response to therapy. I discussed with the patient the risks and benefits of peritonsillar abscess drainage, including but not limited to The possibility of worsening or failure with need for additional procedural intervention including possible repeat incision and drainage was also discussed.     Findings:   There was a fluctuant and edematous asymmetric swelling at the superior  right anterior tonsillar pillar/soft palate.     Description of procedure:   Topical anesthetic was applied in the oropharynx over the tonsil, soft palate, and peritonsillar tissue at planned site of incision and drainage. After waiting for adequate time to obtain topical anesthetic effect at this area, I then infiltrated 1% lidocaine with 1:100,000 epinephrine to the peritonsillar/soft palate tissue at planned site of incision and drainage with a small gauge needle. A larger 18-gauge spinal needle was then placed at the suspected site of abscess and positioning readjusted . There was no purulent material obtained. This procedure was attempted at 2-3 additional areas where abscess located and no purulent material found. Therefore incision not completed given either not fully matured abscess (I.e. phlegmon) or unable to localize exact area of abscess cavity    Significant Labs:  CBC:   Recent Labs   Lab 03/27/23  1530   WBC 17.86*   RBC 4.85   HGB 12.6*   HCT 39.0*      MCV 80*   MCH 26.0*   MCHC 32.3       Significant Diagnostics:  CT: I have reviewed all pertinent results/findings within the past 24 hours and my personal  findings are:  peritonsillar abscess  with effacement of parapharyngeal fat     Edema/phlegmonous changes retropharyngeal parapharyngeal down to epiglottis    Assessment/Plan:     Active Diagnoses:    Diagnosis Date Noted POA    PRINCIPAL PROBLEM:  Peritonsillar abscess [J36] 03/27/2023 Yes    Sepsis [A41.9] 03/27/2023 Yes    Hyperlipidemia associated with type 2 diabetes mellitus [E11.69, E78.5] 01/05/2022 Yes    Hypertension associated with diabetes [E11.59, I15.2] 01/05/2022 Yes    Obesity, diabetes, and hypertension syndrome [E11.69, E66.9, E11.59, I15.2] 01/05/2022 Yes    HILDA (latent autoimmune diabetes in adults), managed as type 1 [E13.9] 02/06/2020 Yes      Problems Resolved During this Admission:     VTE Risk Mitigation (From admission, onward)           Ordered     Place THIERNO hose  Until discontinued         03/27/23 1429     Place sequential compression device  Until discontinued         03/27/23 1429     Reason for No Pharmacological VTE Prophylaxis  Once        Question:  Reasons:  Answer:  Physician Provided (leave comment)  Comment:  going to OR tomorrow    03/27/23 1429     IP VTE HIGH RISK PATIENT  Once         03/27/23 1429                    Thank you for your consult. I will follow-up with patient. Please contact us if you have any additional questions.    Rebecca Cody MD  Otorhinolaryngology-Head & Neck Surgery  [facility] [place] - Emergency Dept

## 2023-03-27 NOTE — SUBJECTIVE & OBJECTIVE
"Past Medical History:   Diagnosis Date    Diabetes     DKA (diabetic ketoacidoses)     Hyperlipidemia     Hypertension        History reviewed. No pertinent surgical history.    Review of patient's allergies indicates:  No Known Allergies    No current facility-administered medications on file prior to encounter.     Current Outpatient Medications on File Prior to Encounter   Medication Sig    atorvastatin (LIPITOR) 40 MG tablet Take 1 tablet (40 mg total) by mouth once daily.    blood-glucose sensor (FREESTYLE MARGIE 3 SENSOR) Mabel Change every 14 days    ciclopirox (PENLAC) 8 % Soln Apply topically nightly.    diclofenac sodium (VOLTAREN) 1 % Gel Apply 2 g topically once daily.    dulaglutide (TRULICITY) 1.5 mg/0.5 mL pen injector Inject 1.5 mg into the skin every 7 days. (Patient taking differently: Inject 1.5 mg into the skin every 7 days. Mondays)    glucagon (BAQSIMI) 3 mg/actuation Spry Give one puff via nostril. Hold device between fingers and thumb, do not push plunger yet, insert tip gently into one nostril until finger(s) touch the outside of the nose, then push plunger firmly all the way in . Dose is complete when the green line disappears.    insulin aspart U-100 (NOVOLOG FLEXPEN U-100 INSULIN) 100 unit/mL (3 mL) InPn pen Inject 15 units before meals plus scale 150-200+2, 201-250+4, 251-300+6, 301-350+8, >350+10. Max daily 75 units.    lisinopriL (PRINIVIL,ZESTRIL) 2.5 MG tablet Take 1 tablet (2.5 mg total) by mouth once daily.    metFORMIN (GLUCOPHAGE-XR) 500 MG ER 24hr tablet Take 1 tablet (500 mg total) by mouth 2 (two) times daily with meals.    MICRO THIN LANCETS 33 gauge Misc     pen needle, diabetic (BD AMELAI 2ND GEN PEN NEEDLE) 32 gauge x 5/32" Ndle Use 4x a day  e 10.65    ciclopirox (LOPROX) 0.77 % Crea Apply topically 2 (two) times daily.    insulin degludec (TRESIBA FLEXTOUCH U-100) 100 unit/mL (3 mL) insulin pen Inject 50 units at night. (Patient not taking: Reported on 3/27/2023)    " [DISCONTINUED] insulin glargine (LANTUS U-100 INSULIN) 100 unit/mL injection Inject 30 Units into the skin 2 (two) times a day.    [DISCONTINUED] sildenafiL (VIAGRA) 100 MG tablet Take 1 tablet (100 mg total) by mouth daily as needed for Erectile Dysfunction.     Family History       Problem Relation (Age of Onset)    Diabetes Mother          Tobacco Use    Smoking status: Never    Smokeless tobacco: Never   Substance and Sexual Activity    Alcohol use: No     Alcohol/week: 0.0 standard drinks    Drug use: No    Sexual activity: Yes     Partners: Female     Comment: Wife     Review of Systems   Constitutional:  Positive for appetite change. Negative for activity change, chills, fatigue and fever.   HENT:  Positive for sore throat and voice change. Negative for congestion, facial swelling, hearing loss, postnasal drip, rhinorrhea, sinus pressure, sinus pain and trouble swallowing.    Respiratory:  Negative for cough and shortness of breath.    Cardiovascular:  Negative for chest pain, palpitations and leg swelling.   Gastrointestinal:  Negative for abdominal distention, abdominal pain, constipation, diarrhea, nausea and vomiting.   Genitourinary:  Negative for difficulty urinating.   Musculoskeletal:  Negative for arthralgias and myalgias.   Neurological:  Negative for facial asymmetry, weakness and numbness.   Psychiatric/Behavioral:  Negative for confusion.    Objective:     Vital Signs (Most Recent):  Temp: 99.8 °F (37.7 °C) (03/27/23 0801)  Pulse: 97 (03/27/23 1216)  Resp: 17 (03/27/23 1216)  BP: 137/79 (03/27/23 1216)  SpO2: 95 % (03/27/23 1216) Vital Signs (24h Range):  Temp:  [99.8 °F (37.7 °C)] 99.8 °F (37.7 °C)  Pulse:  [] 97  Resp:  [17-20] 17  SpO2:  [94 %-96 %] 95 %  BP: (137-174)/() 137/79     Weight: 103.4 kg (228 lb)  Body mass index is 35.71 kg/m².    Physical Exam  Vitals and nursing note reviewed.   Constitutional:       General: He is not in acute distress.     Appearance: He is obese.  He is ill-appearing. He is not toxic-appearing.   HENT:      Head: Normocephalic and atraumatic.      Nose: Nose normal.      Mouth/Throat:      Mouth: Mucous membranes are dry.      Comments: Swelling R oropharynx greater than L. Unable to see tonsils   Eyes:      General: No scleral icterus.     Conjunctiva/sclera: Conjunctivae normal.   Cardiovascular:      Rate and Rhythm: Regular rhythm. Tachycardia present.      Pulses: Normal pulses.      Heart sounds: Normal heart sounds.   Pulmonary:      Effort: Pulmonary effort is normal.      Breath sounds: Normal breath sounds. No stridor.      Comments: Room air  Abdominal:      General: Bowel sounds are normal.      Palpations: Abdomen is soft.   Musculoskeletal:      Right lower leg: No edema.      Left lower leg: No edema.   Skin:     General: Skin is warm and dry.   Neurological:      Mental Status: He is alert and oriented to person, place, and time. Mental status is at baseline.           Significant Labs: All pertinent labs within the past 24 hours have been reviewed.    Significant Imaging: I have reviewed all pertinent imaging results/findings within the past 24 hours.

## 2023-03-27 NOTE — PLAN OF CARE
[facility] - Telemetry  Initial Discharge Assessment       Primary Care Provider: Paddy Torre    Preferred Pharmacy:  [facility] [address] [facility] [address]    Help @ home:  Wife (Katerin)    Transport at discharge:  Wife (Katerin)    Admission Diagnosis: Peritonsillar abscess [J36]  Hyperglycemia [R73.9]  Chest pain [R07.9]  DKA (diabetic ketoacidosis) [E11.10]    Admission Date: 3/27/2023  Expected Discharge Date:     Discharge Barriers Identified: None    Payor: [employer] [facility] / Plan: BCBS ALL OUT OF STATE / Product Type: PPO /     Extended Emergency Contact Information  Primary Emergency Contact: Shania Zapata   [employer]  Home Phone: [phone]  Relation: Mother  Secondary Emergency Contact: Katerin Zafar  Mobile Phone: [phone]  Relation: Spouse    Discharge Plan A: Home with family  Discharge Plan B: Home      [id number] - [place] - [address] [address] [facility] [facility] [address] [place] [address] [facility] [place] [address]  Phone: [phone] Fax: [phone]    [id number] - [place] - [address] AT [facility]  [address]  [place] [address]  Phone: [phone] Fax: [phone]      Initial Assessment (most recent)       Adult Discharge Assessment - 03/27/23 1712          Discharge Assessment    Assessment Type Discharge Planning Assessment     Confirmed/corrected address, phone number and insurance Yes     Confirmed Demographics Correct on Facesheet     Source of Information patient     When was your last doctors appointment? 12/30/22     Reason For Admission Peritonsillar abscess     People in Home significant other     Facility Arrived From: Home     Do you expect to return to your current living situation? Yes     Do you have help at home or someone to help you manage your care at home? Yes     Who are your caregiver(s) and their phone number(s)? wife:  Katerin Zafar  [phone]      Prior to hospitilization cognitive status: Alert/Oriented     Current cognitive status: Alert/Oriented     Equipment Currently Used at Home none     Readmission within 30 days? No     Patient currently being followed by outpatient case management? No     Do you currently have service(s) that help you manage your care at home? No     Do you take prescription medications? Yes     Do you have prescription coverage? Yes     Coverage BCBS     Do you have any problems affording any of your prescribed medications? TBD     Who is going to help you get home at discharge? wife     How do you get to doctors appointments? car, drives self     Are you on dialysis? No     Do you take coumadin? No     Discharge Plan A Home with family     Discharge Plan B Home     DME Needed Upon Discharge  none     Discharge Plan discussed with: Spouse/sig other;Patient     Name(s) and Number(s) Katerin Andrade [phone]     Discharge Barriers Identified None                   Discharge planning assessment completed with patient's assistance with wife, Katerin, at the bedside.  Patient from home with wife and independent.  He has no DMe or oP services.  He will discharge to home when medically stable and followup with his PCP.

## 2023-03-27 NOTE — PHARMACY MED REC
"Admission Medication History     The home medication history was taken by Ginny Dunn CPhT.      You may go to "Admission" then "Reconcile Home Medications" tabs to review and/or act upon these items.     The home medication list has been updated by the Pharmacy department.   Please read ALL comments highlighted in yellow.   Please address this information as you see fit.    Feel free to contact us if you have any questions or require assistance.      The medications listed below were removed from the home medication list. Please reorder if appropriate:  Patient reports no longer taking the following medication(s):  Viagra 100 mg tab      Medications listed below were obtained from: Patient/family and Analytic software- [employer]  (Not in a hospital admission)      Potential issues to be addressed PRIOR TO DISCHARGE  Patient reported not taking the following medications: (insulin degludec (Tresiba) 50 units qhs). These medications remain on the home medication list. Please address accordingly.       Patient stated he took mucinex (honey) Friday and Saturday.      Ginny Dunn CP.  [phone]                  .        "

## 2023-03-27 NOTE — ASSESSMENT & PLAN NOTE
This patient does have evidence of infective focus  My overall impression is sepsis.  Source: peritonsillar abscess and pharyngitis  Antibiotics given-   Antibiotics (72h ago, onward)    Start     Stop Route Frequency Ordered    03/27/23 1600  clindamycin in D5W 600 mg/50 mL IVPB 600 mg         -- IV Every 8 hours (non-standard times) 03/27/23 1440        Latest lactate reviewed- pending   Organ dysfunction indicated by none    Fluid challenge Actual Body weight- Patient will receive 30ml/kg actual body weight to calculate fluid bolus for treatment of septic shock. Received 3L NS. Post- resuscitation assessment Yes Perfusion exam was performed within 6 hours of septic shock presentation after bolus shows Adequate tissue perfusion assessed by non-invasive monitoring     Source control achieved by: antibiotics, ENT consult, OR tomorrow

## 2023-03-27 NOTE — HPI
Mr Fco Zafar III is a 37 y.o. man with DM admitted with 3 days of throat swelling and pain. No fevers. Some change to voice. Able to tolerate PO but decreased appetite. No shortness of breath. No nausea/vomiting. No drooling. No dental pain. No prior mouth surgeries.     Presented to [facility]. Diagnosed with peritonsillar abscess. ENT unable to drain at bedside. Also noted hyperglycemia glucose in 400s. He says he has not taken insulin or metformin in last 2 days.

## 2023-03-28 ENCOUNTER — ANESTHESIA (OUTPATIENT)
Dept: SURGERY | Facility: HOSPITAL | Age: 38
DRG: 853 | End: 2023-03-28
Payer: COMMERCIAL

## 2023-03-28 LAB
ANION GAP SERPL CALC-SCNC: 10 MMOL/L (ref 8–16)
BASOPHILS # BLD AUTO: 0.04 K/UL (ref 0–0.2)
BASOPHILS NFR BLD: 0.3 % (ref 0–1.9)
BUN SERPL-MCNC: 10 MG/DL (ref 6–20)
CALCIUM SERPL-MCNC: 9.3 MG/DL (ref 8.7–10.5)
CHLORIDE SERPL-SCNC: 105 MMOL/L (ref 95–110)
CO2 SERPL-SCNC: 25 MMOL/L (ref 23–29)
CREAT SERPL-MCNC: 0.9 MG/DL (ref 0.5–1.4)
DIFFERENTIAL METHOD: ABNORMAL
EOSINOPHIL # BLD AUTO: 0.1 K/UL (ref 0–0.5)
EOSINOPHIL NFR BLD: 0.4 % (ref 0–8)
ERYTHROCYTE [DISTWIDTH] IN BLOOD BY AUTOMATED COUNT: 12.9 % (ref 11.5–14.5)
EST. GFR  (NO RACE VARIABLE): >60 ML/MIN/1.73 M^2
ESTIMATED AVG GLUCOSE: ABNORMAL MG/DL (ref 68–131)
GLUCOSE SERPL-MCNC: 114 MG/DL (ref 70–110)
HBA1C MFR BLD: >14 % (ref 4–5.6)
HCT VFR BLD AUTO: 35.1 % (ref 40–54)
HGB BLD-MCNC: 11.3 G/DL (ref 14–18)
IMM GRANULOCYTES # BLD AUTO: 0.07 K/UL (ref 0–0.04)
IMM GRANULOCYTES NFR BLD AUTO: 0.5 % (ref 0–0.5)
LYMPHOCYTES # BLD AUTO: 1.4 K/UL (ref 1–4.8)
LYMPHOCYTES NFR BLD: 9.5 % (ref 18–48)
MCH RBC QN AUTO: 25.7 PG (ref 27–31)
MCHC RBC AUTO-ENTMCNC: 32.2 G/DL (ref 32–36)
MCV RBC AUTO: 80 FL (ref 82–98)
MONOCYTES # BLD AUTO: 0.9 K/UL (ref 0.3–1)
MONOCYTES NFR BLD: 5.9 % (ref 4–15)
NEUTROPHILS # BLD AUTO: 12.6 K/UL (ref 1.8–7.7)
NEUTROPHILS NFR BLD: 83.4 % (ref 38–73)
NRBC BLD-RTO: 0 /100 WBC
PHOSPHATE SERPL-MCNC: 3.6 MG/DL (ref 2.7–4.5)
PLATELET # BLD AUTO: 343 K/UL (ref 150–450)
PMV BLD AUTO: 9.1 FL (ref 9.2–12.9)
POCT GLUCOSE: 168 MG/DL (ref 70–110)
POCT GLUCOSE: 231 MG/DL (ref 70–110)
POCT GLUCOSE: 246 MG/DL (ref 70–110)
POCT GLUCOSE: 303 MG/DL (ref 70–110)
POTASSIUM SERPL-SCNC: 4.4 MMOL/L (ref 3.5–5.1)
RBC # BLD AUTO: 4.4 M/UL (ref 4.6–6.2)
SODIUM SERPL-SCNC: 140 MMOL/L (ref 136–145)
WBC # BLD AUTO: 15.14 K/UL (ref 3.9–12.7)

## 2023-03-28 PROCEDURE — 87147 CULTURE TYPE IMMUNOLOGIC: CPT | Performed by: OTOLARYNGOLOGY

## 2023-03-28 PROCEDURE — 25000003 PHARM REV CODE 250: Performed by: STUDENT IN AN ORGANIZED HEALTH CARE EDUCATION/TRAINING PROGRAM

## 2023-03-28 PROCEDURE — 99232 PR SUBSEQUENT HOSPITAL CARE,LEVL II: ICD-10-PCS | Mod: 25,,, | Performed by: OTOLARYNGOLOGY

## 2023-03-28 PROCEDURE — 87075 CULTR BACTERIA EXCEPT BLOOD: CPT | Performed by: OTOLARYNGOLOGY

## 2023-03-28 PROCEDURE — 31575 DIAGNOSTIC LARYNGOSCOPY: CPT | Mod: 51,,, | Performed by: OTOLARYNGOLOGY

## 2023-03-28 PROCEDURE — 25000003 PHARM REV CODE 250: Performed by: HOSPITALIST

## 2023-03-28 PROCEDURE — 85025 COMPLETE CBC W/AUTO DIFF WBC: CPT | Performed by: HOSPITALIST

## 2023-03-28 PROCEDURE — D9220A PRA ANESTHESIA: ICD-10-PCS | Mod: CRNA,,, | Performed by: NURSE ANESTHETIST, CERTIFIED REGISTERED

## 2023-03-28 PROCEDURE — 36000705 HC OR TIME LEV I EA ADD 15 MIN: Performed by: OTOLARYNGOLOGY

## 2023-03-28 PROCEDURE — 31575 PR LARYNGOSCOPY, FLEXIBLE; DIAGNOSTIC: ICD-10-PCS | Mod: 51,,, | Performed by: OTOLARYNGOLOGY

## 2023-03-28 PROCEDURE — 87070 CULTURE OTHR SPECIMN AEROBIC: CPT | Performed by: OTOLARYNGOLOGY

## 2023-03-28 PROCEDURE — D9220A PRA ANESTHESIA: Mod: CRNA,,, | Performed by: NURSE ANESTHETIST, CERTIFIED REGISTERED

## 2023-03-28 PROCEDURE — 71000039 HC RECOVERY, EACH ADD'L HOUR: Performed by: OTOLARYNGOLOGY

## 2023-03-28 PROCEDURE — D9220A PRA ANESTHESIA: Mod: ANES,,, | Performed by: ANESTHESIOLOGY

## 2023-03-28 PROCEDURE — 37000009 HC ANESTHESIA EA ADD 15 MINS: Performed by: OTOLARYNGOLOGY

## 2023-03-28 PROCEDURE — 36000704 HC OR TIME LEV I 1ST 15 MIN: Performed by: OTOLARYNGOLOGY

## 2023-03-28 PROCEDURE — 37000008 HC ANESTHESIA 1ST 15 MINUTES: Performed by: OTOLARYNGOLOGY

## 2023-03-28 PROCEDURE — 11000001 HC ACUTE MED/SURG PRIVATE ROOM

## 2023-03-28 PROCEDURE — 63600175 PHARM REV CODE 636 W HCPCS: Performed by: NURSE ANESTHETIST, CERTIFIED REGISTERED

## 2023-03-28 PROCEDURE — A4216 STERILE WATER/SALINE, 10 ML: HCPCS | Performed by: HOSPITALIST

## 2023-03-28 PROCEDURE — 99232 SBSQ HOSP IP/OBS MODERATE 35: CPT | Mod: 25,,, | Performed by: OTOLARYNGOLOGY

## 2023-03-28 PROCEDURE — 42700 I&D ABSCESS PERITONSILLAR: CPT | Mod: ,,, | Performed by: OTOLARYNGOLOGY

## 2023-03-28 PROCEDURE — 71000033 HC RECOVERY, INTIAL HOUR: Performed by: OTOLARYNGOLOGY

## 2023-03-28 PROCEDURE — 63600175 PHARM REV CODE 636 W HCPCS: Performed by: HOSPITALIST

## 2023-03-28 PROCEDURE — 84100 ASSAY OF PHOSPHORUS: CPT | Performed by: HOSPITALIST

## 2023-03-28 PROCEDURE — D9220A PRA ANESTHESIA: ICD-10-PCS | Mod: ANES,,, | Performed by: ANESTHESIOLOGY

## 2023-03-28 PROCEDURE — 27201423 OPTIME MED/SURG SUP & DEVICES STERILE SUPPLY: Performed by: OTOLARYNGOLOGY

## 2023-03-28 PROCEDURE — 80048 BASIC METABOLIC PNL TOTAL CA: CPT | Performed by: HOSPITALIST

## 2023-03-28 PROCEDURE — 36415 COLL VENOUS BLD VENIPUNCTURE: CPT | Performed by: HOSPITALIST

## 2023-03-28 PROCEDURE — 25000003 PHARM REV CODE 250: Performed by: NURSE ANESTHETIST, CERTIFIED REGISTERED

## 2023-03-28 PROCEDURE — 42700 PR INC/DRAIN PERITONSIL ABSCESS: ICD-10-PCS | Mod: ,,, | Performed by: OTOLARYNGOLOGY

## 2023-03-28 RX ORDER — HYDROMORPHONE HYDROCHLORIDE 2 MG/ML
0.2 INJECTION, SOLUTION INTRAMUSCULAR; INTRAVENOUS; SUBCUTANEOUS EVERY 5 MIN PRN
Status: DISCONTINUED | OUTPATIENT
Start: 2023-03-28 | End: 2023-03-28 | Stop reason: HOSPADM

## 2023-03-28 RX ORDER — LIDOCAINE HYDROCHLORIDE 20 MG/ML
INJECTION INTRAVENOUS
Status: DISCONTINUED | OUTPATIENT
Start: 2023-03-28 | End: 2023-03-28

## 2023-03-28 RX ORDER — CLINDAMYCIN PHOSPHATE 600 MG/50ML
600 INJECTION, SOLUTION INTRAVENOUS
Status: DISCONTINUED | OUTPATIENT
Start: 2023-03-28 | End: 2023-03-30 | Stop reason: HOSPADM

## 2023-03-28 RX ORDER — FENTANYL CITRATE 50 UG/ML
INJECTION, SOLUTION INTRAMUSCULAR; INTRAVENOUS
Status: DISCONTINUED | OUTPATIENT
Start: 2023-03-28 | End: 2023-03-28

## 2023-03-28 RX ORDER — SUCCINYLCHOLINE CHLORIDE 20 MG/ML
INJECTION INTRAMUSCULAR; INTRAVENOUS
Status: DISCONTINUED | OUTPATIENT
Start: 2023-03-28 | End: 2023-03-28

## 2023-03-28 RX ORDER — DEXTROSE 40 %
15 GEL (GRAM) ORAL
Status: DISCONTINUED | OUTPATIENT
Start: 2023-03-28 | End: 2023-03-30 | Stop reason: HOSPADM

## 2023-03-28 RX ORDER — SODIUM CHLORIDE 0.9 % (FLUSH) 0.9 %
10 SYRINGE (ML) INJECTION
Status: DISCONTINUED | OUTPATIENT
Start: 2023-03-28 | End: 2023-03-28 | Stop reason: HOSPADM

## 2023-03-28 RX ORDER — INSULIN ASPART 100 [IU]/ML
0-5 INJECTION, SOLUTION INTRAVENOUS; SUBCUTANEOUS
Status: DISCONTINUED | OUTPATIENT
Start: 2023-03-28 | End: 2023-03-30 | Stop reason: HOSPADM

## 2023-03-28 RX ORDER — GLUCAGON 1 MG
1 KIT INJECTION
Status: DISCONTINUED | OUTPATIENT
Start: 2023-03-29 | End: 2023-03-30 | Stop reason: HOSPADM

## 2023-03-28 RX ORDER — MIDAZOLAM HYDROCHLORIDE 1 MG/ML
INJECTION, SOLUTION INTRAMUSCULAR; INTRAVENOUS
Status: DISCONTINUED | OUTPATIENT
Start: 2023-03-28 | End: 2023-03-28

## 2023-03-28 RX ORDER — INSULIN ASPART 100 [IU]/ML
15 INJECTION, SOLUTION INTRAVENOUS; SUBCUTANEOUS
Status: DISCONTINUED | OUTPATIENT
Start: 2023-03-28 | End: 2023-03-29

## 2023-03-28 RX ORDER — ROCURONIUM BROMIDE 10 MG/ML
INJECTION, SOLUTION INTRAVENOUS
Status: DISCONTINUED | OUTPATIENT
Start: 2023-03-28 | End: 2023-03-28

## 2023-03-28 RX ORDER — INSULIN ASPART 100 [IU]/ML
0-5 INJECTION, SOLUTION INTRAVENOUS; SUBCUTANEOUS EVERY 6 HOURS PRN
Status: DISCONTINUED | OUTPATIENT
Start: 2023-03-28 | End: 2023-03-28

## 2023-03-28 RX ORDER — MUPIROCIN 20 MG/G
OINTMENT TOPICAL 2 TIMES DAILY
Status: DISCONTINUED | OUTPATIENT
Start: 2023-03-28 | End: 2023-03-30 | Stop reason: HOSPADM

## 2023-03-28 RX ORDER — ONDANSETRON 2 MG/ML
INJECTION INTRAMUSCULAR; INTRAVENOUS
Status: DISCONTINUED | OUTPATIENT
Start: 2023-03-28 | End: 2023-03-28

## 2023-03-28 RX ORDER — PROPOFOL 10 MG/ML
VIAL (ML) INTRAVENOUS
Status: DISCONTINUED | OUTPATIENT
Start: 2023-03-28 | End: 2023-03-28

## 2023-03-28 RX ORDER — DEXTROSE 40 %
30 GEL (GRAM) ORAL
Status: DISCONTINUED | OUTPATIENT
Start: 2023-03-28 | End: 2023-03-30 | Stop reason: HOSPADM

## 2023-03-28 RX ADMIN — INSULIN DETEMIR 50 UNITS: 100 INJECTION, SOLUTION SUBCUTANEOUS at 06:03

## 2023-03-28 RX ADMIN — CLINDAMYCIN IN 5 PERCENT DEXTROSE 600 MG: 12 INJECTION, SOLUTION INTRAVENOUS at 03:03

## 2023-03-28 RX ADMIN — FENTANYL CITRATE 50 MCG: 0.05 INJECTION, SOLUTION INTRAMUSCULAR; INTRAVENOUS at 01:03

## 2023-03-28 RX ADMIN — INSULIN ASPART 1 UNITS: 100 INJECTION, SOLUTION INTRAVENOUS; SUBCUTANEOUS at 09:03

## 2023-03-28 RX ADMIN — CLINDAMYCIN PHOSPHATE 600 MG: 150 INJECTION, SOLUTION INTRAVENOUS at 06:03

## 2023-03-28 RX ADMIN — INSULIN ASPART 15 UNITS: 100 INJECTION, SOLUTION INTRAVENOUS; SUBCUTANEOUS at 05:03

## 2023-03-28 RX ADMIN — MIDAZOLAM HYDROCHLORIDE 2 MG: 1 INJECTION, SOLUTION INTRAMUSCULAR; INTRAVENOUS at 01:03

## 2023-03-28 RX ADMIN — CLINDAMYCIN PHOSPHATE 600 MG: 150 INJECTION, SOLUTION INTRAVENOUS at 11:03

## 2023-03-28 RX ADMIN — INSULIN ASPART 4 UNITS: 100 INJECTION, SOLUTION INTRAVENOUS; SUBCUTANEOUS at 05:03

## 2023-03-28 RX ADMIN — SUGAMMADEX 200 MG: 100 INJECTION, SOLUTION INTRAVENOUS at 02:03

## 2023-03-28 RX ADMIN — MUPIROCIN: 20 OINTMENT TOPICAL at 09:03

## 2023-03-28 RX ADMIN — LIDOCAINE HYDROCHLORIDE 100 MG: 20 INJECTION, SOLUTION INTRAVENOUS at 01:03

## 2023-03-28 RX ADMIN — Medication 10 ML: at 09:03

## 2023-03-28 RX ADMIN — ACETAMINOPHEN 650 MG: 325 TABLET ORAL at 03:03

## 2023-03-28 RX ADMIN — ONDANSETRON 4 MG: 2 INJECTION, SOLUTION INTRAMUSCULAR; INTRAVENOUS at 01:03

## 2023-03-28 RX ADMIN — LISINOPRIL 2.5 MG: 2.5 TABLET ORAL at 08:03

## 2023-03-28 RX ADMIN — SODIUM CHLORIDE, SODIUM LACTATE, POTASSIUM CHLORIDE, AND CALCIUM CHLORIDE: .6; .31; .03; .02 INJECTION, SOLUTION INTRAVENOUS at 01:03

## 2023-03-28 RX ADMIN — PROPOFOL 200 MG: 10 INJECTION, EMULSION INTRAVENOUS at 01:03

## 2023-03-28 RX ADMIN — SUCCINYLCHOLINE CHLORIDE 100 MG: 20 INJECTION, SOLUTION INTRAMUSCULAR; INTRAVENOUS at 01:03

## 2023-03-28 RX ADMIN — ATORVASTATIN CALCIUM 40 MG: 40 TABLET, FILM COATED ORAL at 08:03

## 2023-03-28 RX ADMIN — ESMOLOL HYDROCHLORIDE 10 MG: 100 INJECTION, SOLUTION INTRAVENOUS at 02:03

## 2023-03-28 RX ADMIN — ROCURONIUM BROMIDE 30 MG: 10 INJECTION, SOLUTION INTRAVENOUS at 01:03

## 2023-03-28 RX ADMIN — MUPIROCIN: 20 OINTMENT TOPICAL at 08:03

## 2023-03-28 NOTE — ASSESSMENT & PLAN NOTE
This patient does have evidence of infective focus. My overall impression is sepsis. Source: peritonsillar abscess and pharyngitis  Antibiotics given-   Antibiotics (72h ago, onward)    Start     Stop Route Frequency Ordered    03/28/23 0900  mupirocin 2 % ointment         04/02 0859 Nasl 2 times daily 03/28/23 0729        Latest lactate reviewed- normal  Organ dysfunction indicated by none    Fluid challenge Actual Body weight- Patient will receive 30ml/kg actual body weight to calculate fluid bolus for treatment of septic shock. Received 3L NS. Post- resuscitation assessment Yes Perfusion exam was performed within 6 hours of septic shock presentation after bolus shows Adequate tissue perfusion assessed by non-invasive monitoring     Source control achieved by: antibiotics, ENT consult, OR today  Sepsis is resolved

## 2023-03-28 NOTE — ASSESSMENT & PLAN NOTE
Body mass index is 34.15 kg/m². Morbid obesity complicates all aspects of disease management from diagnostic modalities to treatment. Weight loss encouraged and health benefits explained to patient.

## 2023-03-28 NOTE — HOSPITAL COURSE
"Mr Fco Zafar III was admitted with sepsis, DKA, and peritonsillar abscess. Started clindamycin. ENT consulted. DKA resolved with insulin gtt and IVF. To OR on 3/28, noted "pus aspirated from peritonsillar space , incision made and copious purulent material from peritonsillar space extending into parapharyngeal space. Edematous area of lateral pharynx aspirated but no additional pus." Culture with group A Strep. Steadily improving. Medically stable for discharge to home. Follow up with ENT.   "

## 2023-03-28 NOTE — CONSULTS
Nutrition-Related Diabetes Education      Time Spent: 10 min    Learners: Fco Zafar    Current HbA1c:  Hemoglobin A1C   Date Value Ref Range Status   01/03/2023 >14.0 (H) 4.0 - 5.6 % Final     Comment:     ADA Screening Guidelines:  5.7-6.4%  Consistent with prediabetes  >or=6.5%  Consistent with diabetes    High levels of fetal hemoglobin interfere with the HbA1C  assay. Heterozygous hemoglobin variants (HbS, HgC, etc)do  not significantly interfere with this assay.   However, presence of multiple variants may affect accuracy.     11/28/2022 >14.0 (H) 4.0 - 5.6 % Final     Comment:     ADA Screening Guidelines:  5.7-6.4%  Consistent with prediabetes  >or=6.5%  Consistent with diabetes    High levels of fetal hemoglobin interfere with the HbA1C  assay. Heterozygous hemoglobin variants (HbS, HgC, etc)do  not significantly interfere with this assay.   However, presence of multiple variants may affect accuracy.     06/13/2022 >14.0 (H) 4.0 - 5.6 % Final     Comment:     ADA Screening Guidelines:  5.7-6.4%  Consistent with prediabetes  >or=6.5%  Consistent with diabetes    High levels of fetal hemoglobin interfere with the HbA1C  assay. Heterozygous hemoglobin variants (HbS, HgC, etc)do  not significantly interfere with this assay.   However, presence of multiple variants may affect accuracy.          Home  medication(s):  Current Outpatient Medications   Medication Instructions    atorvastatin (LIPITOR) 40 mg, Oral, Daily    blood-glucose sensor (FREESTYLE MARGIE 3 SENSOR) Mabel Change every 14 days    ciclopirox (LOPROX) 0.77 % Crea Topical (Top), 2 times daily    ciclopirox (PENLAC) 8 % Soln Topical (Top), Nightly    diclofenac sodium (VOLTAREN) 2 g, Topical (Top), Daily    glucagon (BAQSIMI) 3 mg/actuation Spry Give one puff via nostril. Hold device between fingers and thumb, do not push plunger yet, insert tip gently into one nostril until finger(s) touch the outside of the nose, then push plunger firmly all the  "way in . Dose is complete when the green line disappears.    insulin aspart U-100 (NOVOLOG FLEXPEN U-100 INSULIN) 100 unit/mL (3 mL) InPn pen Inject 15 units before meals plus scale 150-200+2, 201-250+4, 251-300+6, 301-350+8, >350+10. Max daily 75 units.    insulin degludec (TRESIBA FLEXTOUCH U-100) 100 unit/mL (3 mL) insulin pen Inject 50 units at night.    lisinopriL (PRINIVIL,ZESTRIL) 2.5 mg, Oral, Daily    metFORMIN (GLUCOPHAGE-XR) 500 mg, Oral, 2 times daily with meals    MICRO THIN LANCETS 33 gauge Misc No dose, route, or frequency recorded.    pen needle, diabetic (BD AMELIA 2ND GEN PEN NEEDLE) 32 gauge x 5/32" Ndle Use 4x a day  e 10.65    TRULICITY 1.5 mg, Subcutaneous, Every 7 days        Nutrition Education with handouts: Diabetes and Diet - Carbohydrate Controlled Diet - Counting Carbohydrates     Comments: RD consult 2/2 DM [facility]. Pt educated in room. Pt in and out of sleep during education. All questions and concerns answered     Follow up: as needed     Please consult as needed.  Thank you!     Fernanda Casillas, Registration Eligible, Provisional LDN  "

## 2023-03-28 NOTE — ANESTHESIA PREPROCEDURE EVALUATION
03/27/2023    Pre-operative evaluation for Procedure(s) (LRB):  INCISION AND DRAINAGE PARAPHARYNGEAL ABSCESS (N/A)    Fco Zafar III is a 37 y.o. 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), managed as type 1    Hyperlipidemia associated with type 2 diabetes mellitus    Hypertension associated with diabetes    Obesity, diabetes, and hypertension syndrome    Type 1 diabetes mellitus with obesity    Onychomycosis of multiple toenails with type 2 diabetes mellitus    Encounter for long-term (current) use of other medications    Microalbuminuria due to type 2 diabetes mellitus    Peritonsillar abscess    Sepsis    Microcytic anemia    Diabetic ketoacidosis without coma       Review of patient's allergies indicates:  No Known Allergies    No current facility-administered medications on file prior to encounter.     Current Outpatient Medications on File Prior to Encounter   Medication Sig Dispense Refill    atorvastatin (LIPITOR) 40 MG tablet Take 1 tablet (40 mg total) by mouth once daily. 90 tablet 3    blood-glucose sensor (MetavanaSTYLE MARGIE 3 SENSOR) Mabel Change every 14 days 2 each 11    ciclopirox (PENLAC) 8 % Soln Apply topically nightly. 6.6 mL 11    diclofenac sodium (VOLTAREN) 1 % Gel Apply 2 g topically once daily. 100 g 3    dulaglutide (TRULICITY) 1.5 mg/0.5 mL pen injector Inject 1.5 mg into the skin every 7 days. (Patient taking differently: Inject 1.5 mg into the skin every 7 days. Mondays) 4 pen 6    glucagon (BAQSIMI) 3 mg/actuation Spry Give one puff via nostril. Hold device between fingers and thumb, do not push plunger yet, insert tip gently into one nostril until finger(s) touch the outside of  "the nose, then push plunger firmly all the way in . Dose is complete when the green line disappears. 2 each 2    insulin aspart U-100 (NOVOLOG FLEXPEN U-100 INSULIN) 100 unit/mL (3 mL) InPn pen Inject 15 units before meals plus scale 150-200+2, 201-250+4, 251-300+6, 301-350+8, >350+10. Max daily 75 units. 30 mL 6    lisinopriL (PRINIVIL,ZESTRIL) 2.5 MG tablet Take 1 tablet (2.5 mg total) by mouth once daily. 90 tablet 3    metFORMIN (GLUCOPHAGE-XR) 500 MG ER 24hr tablet Take 1 tablet (500 mg total) by mouth 2 (two) times daily with meals. 180 tablet 3    MICRO THIN LANCETS 33 gauge Misc       pen needle, diabetic (BD AMELIA 2ND GEN PEN NEEDLE) 32 gauge x 5/32" Ndle Use 4x a day  e 10.65 400 each 3    ciclopirox (LOPROX) 0.77 % Crea Apply topically 2 (two) times daily. 90 g 2    insulin degludec (TRESIBA FLEXTOUCH U-100) 100 unit/mL (3 mL) insulin pen Inject 50 units at night. (Patient not taking: Reported on 3/27/2023) 5 pen 6    [DISCONTINUED] insulin glargine (LANTUS U-100 INSULIN) 100 unit/mL injection Inject 30 Units into the skin 2 (two) times a day. 18 mL 11       History reviewed. No pertinent surgical history.    Social History     Socioeconomic History    Marital status:     Number of children: 0   Tobacco Use    Smoking status: Never    Smokeless tobacco: Never   Substance and Sexual Activity    Alcohol use: No     Alcohol/week: 0.0 standard drinks    Drug use: No    Sexual activity: Yes     Partners: Female     Comment: Wife       LABS  CBC:   Recent Labs     03/27/23  1530 03/28/23  0450   WBC 17.86* 15.14*   RBC 4.85 4.40*   HGB 12.6* 11.3*   HCT 39.0* 35.1*    343   MCV 80* 80*   MCH 26.0* 25.7*   MCHC 32.3 32.2       CMP:   Recent Labs     03/27/23  1530 03/27/23  1918 03/27/23  2325 03/28/23  0450      < > 138 140   K 5.0   < > 4.4 4.4      < > 107 105   CO2 17*   < > 22* 25   BUN 12   < > 12 10   CREATININE 1.1   < > 1.0 0.9   *   < > 178* 114*   PHOS  " --   --   --  3.6   CALCIUM 9.8   < > 9.3 9.3   ALBUMIN 3.5  --   --   --    PROT 8.7*  --   --   --    ALKPHOS 151*  --   --   --    ALT 10  --   --   --    AST 11  --   --   --    BILITOT 0.4  --   --   --     < > = values in this interval not displayed.       Pre-op Assessment    I have reviewed the Patient Summary Reports.     I have reviewed the Nursing Notes.       Review of Systems  Anesthesia Hx:  Neg history of prior surgery. Denies Family Hx of Anesthesia complications.   Denies Personal Hx of Anesthesia complications.   Social:  Non-Smoker    Hematology/Oncology:  Hematology Normal   Oncology Normal     EENT/Dental:  EENT/Dental Normal Right peritonsilar abscess   Cardiovascular:   Hypertension hyperlipidemia    Pulmonary:  Pulmonary Normal    Renal/:   Chronic Renal Disease    Hepatic/GI:  Hepatic/GI Normal    Neurological:  Neurology Normal    Endocrine:   Diabetes, poorly controlled DKA       Physical Exam  General: Well nourished, Cooperative, Alert and Oriented    Airway:  Mallampati: III   Mouth Opening: Normal  TM Distance: Normal  Tongue: Normal  Neck ROM: Normal ROM    Dental:  Intact    Chest/Lungs:  Normal Respiratory Rate    Heart:  Rate: Normal  Rhythm: Regular Rhythm  Sounds: Normal      Wt Readings from Last 3 Encounters:   03/27/23 98.9 kg (218 lb 0.6 oz)   01/23/23 103.7 kg (228 lb 9.9 oz)   01/09/23 103.7 kg (228 lb 9.9 oz)     Temp Readings from Last 3 Encounters:   03/27/23 36.8 °C (98.2 °F)   01/01/21 36.8 °C (98.3 °F) (Oral)   03/20/20 (!) 39 °C (102.2 °F)     BP Readings from Last 3 Encounters:   03/27/23 (!) 145/77   01/09/23 122/82   11/28/22 122/88     Pulse Readings from Last 3 Encounters:   03/27/23 97   01/09/23 104   11/28/22 108     Lab Results   Component Value Date    WBC 17.86 (H) 03/27/2023    HGB 12.6 (L) 03/27/2023    HCT 39.0 (L) 03/27/2023    MCV 80 (L) 03/27/2023     03/27/2023       CMP  Sodium   Date Value Ref Range Status   03/27/2023 137 136 - 145  mmol/L Final     Potassium   Date Value Ref Range Status   03/27/2023 5.0 3.5 - 5.1 mmol/L Final     Chloride   Date Value Ref Range Status   03/27/2023 101 95 - 110 mmol/L Final     CO2   Date Value Ref Range Status   03/27/2023 17 (L) 23 - 29 mmol/L Final     Glucose   Date Value Ref Range Status   03/27/2023 320 (H) 70 - 110 mg/dL Final     BUN   Date Value Ref Range Status   03/27/2023 12 6 - 20 mg/dL Final     Creatinine   Date Value Ref Range Status   03/27/2023 1.1 0.5 - 1.4 mg/dL Final     Calcium   Date Value Ref Range Status   03/27/2023 9.8 8.7 - 10.5 mg/dL Final     Total Protein   Date Value Ref Range Status   03/27/2023 8.7 (H) 6.0 - 8.4 g/dL Final     Albumin   Date Value Ref Range Status   03/27/2023 3.5 3.5 - 5.2 g/dL Final     Total Bilirubin   Date Value Ref Range Status   03/27/2023 0.4 0.1 - 1.0 mg/dL Final     Comment:     For infants and newborns, interpretation of results should be based  on gestational age, weight and in agreement with clinical  observations.    Premature Infant recommended reference ranges:  Up to 24 hours.............<8.0 mg/dL  Up to 48 hours............<12.0 mg/dL  3-5 days..................<15.0 mg/dL  6-29 days.................<15.0 mg/dL       Alkaline Phosphatase   Date Value Ref Range Status   03/27/2023 151 (H) 55 - 135 U/L Final     AST   Date Value Ref Range Status   03/27/2023 11 10 - 40 U/L Final     ALT   Date Value Ref Range Status   03/27/2023 10 10 - 44 U/L Final     Anion Gap   Date Value Ref Range Status   03/27/2023 19 (H) 8 - 16 mmol/L Final     eGFR   Date Value Ref Range Status   03/27/2023 >60 >60 mL/min/1.73 m^2 Final         Anesthesia Plan  Type of Anesthesia, risks & benefits discussed:    Anesthesia Type: Gen ETT  Intra-op Monitoring Plan: Standard ASA Monitors  Induction:  IV  Informed Consent: Informed consent signed with the Patient and all parties understand the risks and agree with anesthesia plan.  All questions answered.   ASA Score:  2    Ready For Surgery From Anesthesia Perspective.     .

## 2023-03-28 NOTE — ASSESSMENT & PLAN NOTE
Patient's FSGs are uncontrolled due to hyperglycemia on current medication regimen.  Last A1c reviewed-   Lab Results   Component Value Date    HGBA1C >14.0 (H) 01/03/2023     Most recent fingerstick glucose reviewed-   Recent Labs   Lab 03/27/23 2030 03/27/23 2129 03/27/23 2228 03/27/23 2323   POCTGLUCOSE 235* 249* 218* 165*     Current correctional scale  Low  continue anti-hyperglycemic dose as follows-   Antihyperglycemics (From admission, onward)    Start     Stop Route Frequency Ordered    03/28/23 0708  insulin aspart U-100 pen 0-5 Units         -- SubQ Every 6 hours PRN 03/28/23 0608    03/28/23 0615  insulin detemir U-100 (Levemir) pen 50 Units         -- SubQ Daily 03/28/23 0608        Hold Oral hypoglycemics while patient is in the hospital- on metformin, trulicity also as outpatient   - repeat A1c pending

## 2023-03-28 NOTE — NURSING
[facility] [place]  Nurses Note -- 4 Eyes      3/28/2023       Skin assessed on: Q Shift      X No Pressure Injuries Present    []Prevention Measures Documented    [] Yes LDA  for Pressure Injury Previously documented     [] Yes New Pressure Injury Discovered   [] LDA for New Pressure Injury Added      Attending RN:  REGIS MELENDEZ RN     Second RN:

## 2023-03-28 NOTE — TRANSFER OF CARE
"Anesthesia Transfer of Care Note    Patient: Fco Zafar III    Procedure(s) Performed: Procedure(s) (LRB):  INCISION AND DRAINAGE PARAPHARYNGEAL ABSCESS (Right)    Patient location: PACU    Anesthesia Type: general    Transport from OR: Transported from OR on 6-10 L/min O2 by face mask with adequate spontaneous ventilation    Post pain: adequate analgesia    Post assessment: no apparent anesthetic complications and tolerated procedure well    Post vital signs: stable    Level of consciousness: awake and alert    Nausea/Vomiting: no nausea/vomiting    Complications: none    Transfer of care protocol was followed      Last vitals:   Visit Vitals  /89 (BP Location: Right arm, Patient Position: Lying)   Pulse (!) 118   Temp (!) 38.3 °C (100.9 °F) (Temporal)   Resp (!) 25   Ht 5' 7" (1.702 m)   Wt 98.9 kg (218 lb 0.6 oz)   SpO2 (!) 93%   BMI 34.15 kg/m²     "

## 2023-03-28 NOTE — ASSESSMENT & PLAN NOTE
Presented with DKA  A, bicarb: 17, pH: not checked, betahydroxybutyrate: 4.4, urine ketones: not checked  Cause of DKA: peritonsillar abscess and lack on insulin x2 days prior to admit  - treated with insulin gtt, IVF  - this is resolved

## 2023-03-28 NOTE — ANESTHESIA POSTPROCEDURE EVALUATION
Anesthesia Post Evaluation    Patient: Fco Zafar III    Procedure(s) Performed: Procedure(s) (LRB):  INCISION AND DRAINAGE PARAPHARYNGEAL ABSCESS (Right)    Final Anesthesia Type: general      Patient location during evaluation: PACU  Patient participation: Yes- Able to Participate  Level of consciousness: awake and alert and oriented  Post-procedure vital signs: reviewed and stable  Pain management: adequate  Airway patency: patent    PONV status at discharge: No PONV  Anesthetic complications: no      Cardiovascular status: blood pressure returned to baseline, hemodynamically stable and stable  Respiratory status: unassisted, spontaneous ventilation and room air  Hydration status: euvolemic  Follow-up not needed.          Vitals Value Taken Time   /68 03/28/23 1532   Temp 38.4 °C (101.2 °F) 03/28/23 1529   Pulse 121 03/28/23 1538   Resp 30 03/28/23 1538   SpO2 94 % 03/28/23 1538   Vitals shown include unvalidated device data.      No case tracking events are documented in the log.      Pain/Calvin Score: Pain Rating Prior to Med Admin: 0 (3/28/2023  3:29 PM)  Calvin Score: 9 (3/28/2023  2:52 PM)

## 2023-03-28 NOTE — OP NOTE
[facility]  Surgery Department  Operative Note    SUMMARY     Date of Procedure: 3/28/2023     Procedure: Procedure(s) (LRB):  INCISION AND DRAINAGE PARAPHARYNGEAL ABSCESS (Right) /peritonsillar abscess    Surgeon(s) and Role:     * Rebecca Cody MD - Primary    Assisting Surgeon: None    Pre-Operative Diagnosis:  parapharyngeal space abscess   Peritonsillar abscess [J36]    Post-Operative Diagnosis: Post-Op Diagnosis Codes:     * Peritonsillar abscess [J36]  parapharyngeal space abscess    Anesthesia: General    Operative Findings (including complications, if any): pus aspirated from peritonsillar space , incision made and copious purulent material from peritonsillar space extending into parapharyngeal space. Edematous area of lateral pharynx aspirated but no additional pus.        Description of Technical Procedures:   The patient was identified in the holding area per routine. He was taken to the operating room and placed supine on the operating table. The patient was intubated transorally by the anesthesiology service, and an adequate level of general endotracheal anesthesia was achieved. The head of the patient's bed was rotated 90 degrees away from anesthesia. His eyes were protected with tape.  The mcivor mouth gag  was inserted in the patient's oral cavity. An 18 gauge needle was placed in the peritonsillar space at site of presumed abscess. Purulent material was obtained on first attempt. A 15 blade was then used to incise over the aspiration site about a 1 cm incision. A hemostat was used to widely open the peritonsillar space and extended inferiorly into parapharygneal space. This led to copious purulent material. Cultures were obtained. The abscess cavity was irrigated copiously with saline. Aspiration of inferior lateral pharyngeal wall with 18 gauge needle yielded no additional purulent drainage therefore no additional sites of incision needed. Afrin soaked patties were placed along  incision site for hemostasis and then removed after adequate hemostasis achieved. This concluded the procedure and the patient was handed back over to anesthesia and extubated without complication.      Significant Surgical Tasks Conducted by the Assistant(s), if Applicable: none    Estimated Blood Loss (EBL):10 cc           Implants: none    Specimens:   Specimen (24h ago, onward)      None         Cultures sent of infectious material drained from abscess cavity            Condition: Good    Disposition: PACU - hemodynamically stable.    Attestation: I was present and scrubbed for the entire procedure.

## 2023-03-28 NOTE — SUBJECTIVE & OBJECTIVE
Interval History: Throat is dry, no other complaints. Wife at bedside.     Review of Systems   Constitutional:  Negative for chills and fever.   HENT:  Positive for sore throat. Negative for congestion, postnasal drip, sinus pressure, sinus pain, trouble swallowing and voice change.    Respiratory:  Negative for cough and shortness of breath.    Cardiovascular:  Negative for chest pain and leg swelling.   Gastrointestinal:  Negative for abdominal pain.   Genitourinary:  Negative for difficulty urinating.   Psychiatric/Behavioral:  Negative for confusion.    Objective:     Vital Signs (Most Recent):  Temp: 98.4 °F (36.9 °C) (03/28/23 0715)  Pulse: 84 (03/28/23 0605)  Resp: 14 (03/28/23 0605)  BP: 129/62 (03/28/23 0605)  SpO2: 98 % (03/28/23 0605) Vital Signs (24h Range):  Temp:  [97.9 °F (36.6 °C)-99.8 °F (37.7 °C)] 98.4 °F (36.9 °C)  Pulse:  [] 84  Resp:  [12-26] 14  SpO2:  [91 %-98 %] 98 %  BP: (111-178)/() 129/62     Weight: 98.9 kg (218 lb 0.6 oz)  Body mass index is 34.15 kg/m².    Intake/Output Summary (Last 24 hours) at 3/28/2023 0734  Last data filed at 3/28/2023 0110  Gross per 24 hour   Intake 4155.36 ml   Output --   Net 4155.36 ml      Physical Exam  Vitals and nursing note reviewed.   Constitutional:       General: He is not in acute distress.     Appearance: He is not ill-appearing or toxic-appearing.   HENT:      Head: Normocephalic and atraumatic.      Nose: Nose normal.      Mouth/Throat:      Mouth: Mucous membranes are moist.      Pharynx: Posterior oropharyngeal erythema present.   Eyes:      General: No scleral icterus.     Conjunctiva/sclera: Conjunctivae normal.   Cardiovascular:      Rate and Rhythm: Normal rate and regular rhythm.   Pulmonary:      Effort: Pulmonary effort is normal.      Breath sounds: Normal breath sounds.      Comments: 1L O2 by NC  Abdominal:      General: Bowel sounds are normal.      Palpations: Abdomen is soft.   Musculoskeletal:      Right lower leg: No  edema.      Left lower leg: No edema.   Skin:     General: Skin is warm and dry.   Neurological:      Mental Status: He is alert and oriented to person, place, and time. Mental status is at baseline.       Significant Labs: All pertinent labs within the past 24 hours have been reviewed.    Significant Imaging: I have reviewed all pertinent imaging results/findings within the past 24 hours.

## 2023-03-28 NOTE — PLAN OF CARE
Problem: Adult Inpatient Plan of Care  Goal: Plan of Care Review  Outcome: Ongoing, Progressing  Goal: Patient-Specific Goal (Individualized)  Outcome: Ongoing, Progressing  Goal: Absence of Hospital-Acquired Illness or Injury  Outcome: Ongoing, Progressing  Goal: Optimal Comfort and Wellbeing  Outcome: Ongoing, Progressing  Goal: Readiness for Transition of Care  Outcome: Ongoing, Progressing     Problem: Infection  Goal: Absence of Infection Signs and Symptoms  Outcome: Ongoing, Progressing     Problem: Diabetes Comorbidity  Goal: Blood Glucose Level Within Targeted Range  Outcome: Ongoing, Progressing     Problem: Adjustment to Illness (Sepsis/Septic Shock)  Goal: Optimal Coping  Outcome: Ongoing, Progressing     Problem: Bleeding (Sepsis/Septic Shock)  Goal: Absence of Bleeding  Outcome: Ongoing, Progressing     Problem: Glycemic Control Impaired (Sepsis/Septic Shock)  Goal: Blood Glucose Level Within Desired Range  Outcome: Ongoing, Progressing     Problem: Infection Progression (Sepsis/Septic Shock)  Goal: Absence of Infection Signs and Symptoms  Outcome: Ongoing, Progressing     Problem: Nutrition Impaired (Sepsis/Septic Shock)  Goal: Optimal Nutrition Intake  Outcome: Ongoing, Progressing     Problem: Diabetic Ketoacidosis  Goal: Fluid and Electrolyte Balance with Absence of Ketosis  Outcome: Ongoing, Progressing

## 2023-03-28 NOTE — PROGRESS NOTES
[facility] Medicine  Progress Note    Patient Name: Fco Zafar III  MRN: [id number]  Patient Class: IP- Inpatient   Admission Date: 3/27/2023  Length of Stay: 1 days  Attending Physician: Amrita Olson MD  Primary Care Provider: Paddy Torre        Subjective:     Principal Problem:Peritonsillar abscess        HPI:  Mr Fco Zafar III is a 37 y.o. man with DM admitted with 3 days of throat swelling and pain. No fevers. Some change to voice. Able to tolerate PO but decreased appetite. No shortness of breath. No nausea/vomiting. No drooling. No dental pain. No prior mouth surgeries.     Presented to [facility]. Diagnosed with peritonsillar abscess. ENT unable to drain at bedside. Also noted hyperglycemia glucose in 400s. He says he has not taken insulin or metformin in last 2 days.       Overview/Hospital Course:  Mr Fco Zafar III was admitted with sepsis, DKA, and peritonsillar abscess. Started clindamycin. ENT consulted. DKA resolved with insulin gtt and IVF. Plan OR on 3/28.       Interval History: Throat is dry, no other complaints. Wife at bedside.     Review of Systems   Constitutional:  Negative for chills and fever.   HENT:  Positive for sore throat. Negative for congestion, postnasal drip, sinus pressure, sinus pain, trouble swallowing and voice change.    Respiratory:  Negative for cough and shortness of breath.    Cardiovascular:  Negative for chest pain and leg swelling.   Gastrointestinal:  Negative for abdominal pain.   Genitourinary:  Negative for difficulty urinating.   Psychiatric/Behavioral:  Negative for confusion.    Objective:     Vital Signs (Most Recent):  Temp: 98.4 °F (36.9 °C) (03/28/23 0715)  Pulse: 84 (03/28/23 0605)  Resp: 14 (03/28/23 0605)  BP: 129/62 (03/28/23 0605)  SpO2: 98 % (03/28/23 0605) Vital Signs (24h Range):  Temp:  [97.9 °F (36.6 °C)-99.8 °F (37.7 °C)] 98.4 °F (36.9 °C)  Pulse:  [] 84  Resp:  [12-26] 14  SpO2:  [91 %-98 %] 98 %  BP:  (111-178)/() 129/62     Weight: 98.9 kg (218 lb 0.6 oz)  Body mass index is 34.15 kg/m².    Intake/Output Summary (Last 24 hours) at 3/28/2023 0734  Last data filed at 3/28/2023 0110  Gross per 24 hour   Intake 4155.36 ml   Output --   Net 4155.36 ml      Physical Exam  Vitals and nursing note reviewed.   Constitutional:       General: He is not in acute distress.     Appearance: He is not ill-appearing or toxic-appearing.   HENT:      Head: Normocephalic and atraumatic.      Nose: Nose normal.      Mouth/Throat:      Mouth: Mucous membranes are moist.      Pharynx: Posterior oropharyngeal erythema present.   Eyes:      General: No scleral icterus.     Conjunctiva/sclera: Conjunctivae normal.   Cardiovascular:      Rate and Rhythm: Normal rate and regular rhythm.   Pulmonary:      Effort: Pulmonary effort is normal.      Breath sounds: Normal breath sounds.      Comments: 1L O2 by NC  Abdominal:      General: Bowel sounds are normal.      Palpations: Abdomen is soft.   Musculoskeletal:      Right lower leg: No edema.      Left lower leg: No edema.   Skin:     General: Skin is warm and dry.   Neurological:      Mental Status: He is alert and oriented to person, place, and time. Mental status is at baseline.       Significant Labs: All pertinent labs within the past 24 hours have been reviewed.    Significant Imaging: I have reviewed all pertinent imaging results/findings within the past 24 hours.      Assessment/Plan:      * Peritonsillar abscess  CT showing enlarged R palatine tonsil with edema and concern for pharyngitis and peritonsillar abscess. Airway patent. Reactive cervical nodes. DM is a risk factor.   - ENT consulted  - continue clindamycin  - to OR today    Diabetic ketoacidosis without coma  Presented with DKA  A, bicarb: 17, pH: not checked, betahydroxybutyrate: 4.4, urine ketones: not checked  Cause of DKA: peritonsillar abscess and lack on insulin x2 days prior to admit  - treated with  insulin gtt, IVF  - this is resolved       Microcytic anemia  Suspect iron deficiency  Follow up with PCP for further workup      Sepsis  This patient does have evidence of infective focus. My overall impression is sepsis. Source: peritonsillar abscess and pharyngitis  Antibiotics given-   Antibiotics (72h ago, onward)    Start     Stop Route Frequency Ordered    03/28/23 0900  mupirocin 2 % ointment         04/02 0859 Nasl 2 times daily 03/28/23 0729        Latest lactate reviewed- normal  Organ dysfunction indicated by none    Fluid challenge Actual Body weight- Patient will receive 30ml/kg actual body weight to calculate fluid bolus for treatment of septic shock. Received 3L NS. Post- resuscitation assessment Yes Perfusion exam was performed within 6 hours of septic shock presentation after bolus shows Adequate tissue perfusion assessed by non-invasive monitoring     Source control achieved by: antibiotics, ENT consult, OR today  Sepsis is resolved     Obesity, diabetes, and hypertension syndrome  Body mass index is 34.15 kg/m². Morbid obesity complicates all aspects of disease management from diagnostic modalities to treatment. Weight loss encouraged and health benefits explained to patient.         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  Last A1c reviewed-   Lab Results   Component Value Date    HGBA1C >14.0 (H) 01/03/2023     Most recent fingerstick glucose reviewed-   Recent Labs   Lab 03/27/23 2030 03/27/23 2129 03/27/23  2228 03/27/23  2323   POCTGLUCOSE 235* 249* 218* 165*     Current correctional scale  Low  continue anti-hyperglycemic dose as follows-    Antihyperglycemics (From admission, onward)    Start     Stop Route Frequency Ordered    03/28/23 0708  insulin aspart U-100 pen 0-5 Units         -- SubQ Every 6 hours PRN 03/28/23 0608    03/28/23 0615  insulin detemir U-100 (Levemir) pen 50 Units         -- SubQ Daily 03/28/23 0608        Hold Oral hypoglycemics while patient is in the hospital- on metformin, trulicity also as outpatient   - repeat A1c pending      VTE Risk Mitigation (From admission, onward)         Ordered     Place THIERNO hose  Until discontinued         03/27/23 1429     Place sequential compression device  Until discontinued         03/27/23 1429     Reason for No Pharmacological VTE Prophylaxis  Once        Question:  Reasons:  Answer:  Physician Provided (leave comment)  Comment:  going to OR tomorrow    03/27/23 1429     IP VTE HIGH RISK PATIENT  Once         03/27/23 1429                Discharge Planning   VIOLETA: 3/29/2023     Code Status: Full Code   Is the patient medically ready for discharge?:     Reason for patient still in hospital (select all that apply): Consult recommendations  Discharge Plan A: Home with family            Amrita Olson MD  Department of Hospital Medicine   [facility] - Intensive Care

## 2023-03-28 NOTE — OP NOTE
[facility] - Surgery  Brief Operative Note    Surgery Date: 3/28/2023     Surgeon(s) and Role:     * Rebecca Cody MD - Primary    Assisting Surgeon: None    Pre-op Diagnosis:  Peritonsillar/parapharyngeal space abscess [J36]    Post-op Diagnosis:  Peritonsillar/parapharyngeal space abscess [J36]    Procedure(s) (LRB):  INCISION AND DRAINAGE PARAPHARYNGEAL ABSCESS (Right)    Anesthesia: General    Operative Findings: pus aspirated from peritonsillar space , incision made and copious purulent material from peritonsillar space extending into parapharyngeal space. Edematous area of lateral pharynx aspirated but no additional pus.     Estimated Blood Loss: 10 cc         Specimens:   Specimen (24h ago, onward)      None        Aerobic and anaerobic culture obtained

## 2023-03-28 NOTE — ANESTHESIA PROCEDURE NOTES
Intubation    Date/Time: 3/28/2023 1:53 PM  Performed by: Jennifer Romeo CRNA  Authorized by: Ashley Charles MD     Intubation:     Induction:  Intravenous    Intubated:  Postinduction    Mask Ventilation:  Not attempted    Attempts:  1    Attempted By:  Student    Method of Intubation:  Video laryngoscopy    Blade:  Liang 3    Laryngeal View Grade: Grade I - full view of cords      Difficult Airway Encountered?: No      Complications:  None    Airway Device:  Oral endotracheal tube    Airway Device Size:  7.5    Style/Cuff Inflation:  Cuffed (inflated to minimal occlusive pressure)    Inflation Amount (mL):  7    Tube secured:  22    Secured at:  The lips    Placement Verified By:  Capnometry    Complicating Factors:  None    Findings Post-Intubation:  BS equal bilateral and atraumatic/condition of teeth unchanged

## 2023-03-28 NOTE — PROGRESS NOTES
[facility] Intensive Care  Otorhinolaryngology-Head & Neck Surgery  Progress Note    Patient Name: Fco Zafar III  MRN: [id number]  Code Status: Full Code  Admission Date: 3/27/2023  Hospital Length of Stay: 1 days  Attending Physician: Amrita Olson MD  Primary Care Provider: Paddy Torre    Subjective:     Interval History: pain improved but still there.     Post-Op Info:  Procedure(s) (LRB):  INCISION AND DRAINAGE PARAPHARYNGEAL ABSCESS (N/A)      Hospital Day: 2      Medications:  Continuous Infusions:  Scheduled Meds:   atorvastatin  40 mg Oral Daily    clindamycin in D5W  600 mg Intravenous Q8H    insulin detemir U-100  50 Units Subcutaneous Daily    lisinopriL  2.5 mg Oral Daily    mupirocin   Nasal BID    senna-docusate 8.6-50 mg  1 tablet Oral BID    sodium chloride 0.9%  10 mL Intravenous Q8H     PRN Meds:acetaminophen, dextrose 10%, dextrose 10%, dextrose 10%, dextrose 10%, dextrose 10%, dextrose 10%, dextrose, dextrose, glucagon (human recombinant), [START ON 3/29/2023] glucagon (human recombinant), insulin aspart U-100, melatonin, naloxone, ondansetron, prochlorperazine, sodium chloride 0.9%     Objective:     Vital Signs (24h Range):  Temp:  [97.9 °F (36.6 °C)-99.4 °F (37.4 °C)] 98.4 °F (36.9 °C)  Pulse:  [] 93  Resp:  [12-26] 17  SpO2:  [91 %-98 %] 95 %  BP: (111-178)/(56-98) 121/56     Date 03/28/23 0700 - 03/29/23 0659   Shift 7188-1463 9641-4878 4279-6357 24 Hour Total   INTAKE   P.O. 50   50   Shift Total(mL/kg) 50(0.5)   50(0.5)   OUTPUT   Shift Total(mL/kg)       Weight (kg) 98.9 98.9 98.9 98.9     Lines/Drains/Airways       Peripheral Intravenous Line  Duration                  Peripheral IV - Single Lumen 03/27/23 0843 20 G Left Antecubital 1 day         Peripheral IV - Single Lumen 03/27/23 1820 20 G Anterior;Left Upper Arm <1 day                    Physical Exam  HENT:      Head: Normocephalic.      Mouth/Throat:        Comments: Guarding with tongue, difficult to assess  posterior oropharynx despite multiple maneuvers. Brisk gag reflex  Neurological:      Mental Status: He is alert.     PROCEDURE NOTE  NAME OF PROCEDURE: Flexible Laryngoscopy, diagnostic  INDICATIONS: gag reflex precludes mirror exam, throat pain in adult ; parapharyngeal space abscess  FINDINGS: edema of pharynx unchanged    Consent: After procedure was explained in detail and all questions answered, verbal consent was obtained for performing flexible laryngoscopy.  Anesthesia: topical 4% lidocaine and neosynephrine  Procedure: With patient in seated position, the scope was inserted into the bilateral nasal passageway and advanced atraumatically into the nasopharynx to examine the following structures:  Nasal cavity: Turbinates with mild hypertrophy. No purulent drainage.   Nasopharynx: no mass or lesion noted in nasopharynx.   Oropharynx: base of tongue without  mass or ulceration. Lingual tonsils normal in appearance  Hypopharynx: lateral pharynx with edema- unchanged  Larynx: epiglottis normal without lesion. False vocal folds without edema/erythema/lesion. True vocal folds mobile and without lesion. nointerarytenoid edema no erythema . Postcricoid region with outedema no lesion   Subglottis: visualized portion of subglottis normal in appearance    After examination performed, the scope was removed atraumatically . The patient tolerated the procedure well.   Significant Labs:  CBC:   Recent Labs   Lab 03/28/23  0450   WBC 15.14*   RBC 4.40*   HGB 11.3*   HCT 35.1*      MCV 80*   MCH 25.7*   MCHC 32.2     CMP:   Recent Labs   Lab 03/27/23  1530 03/27/23  1918 03/28/23  0450   *   < > 114*   CALCIUM 9.8   < > 9.3   ALBUMIN 3.5  --   --    PROT 8.7*  --   --       < > 140   K 5.0   < > 4.4   CO2 17*   < > 25      < > 105   BUN 12   < > 10   CREATININE 1.1   < > 0.9   ALKPHOS 151*  --   --    ALT 10  --   --    AST 11  --   --    BILITOT 0.4  --   --     < > = values in this interval not  displayed.       Assessment/Plan:     Active Diagnoses:    Diagnosis Date Noted POA    PRINCIPAL PROBLEM:  Peritonsillar abscess [J36] 03/27/2023 Yes    Sepsis [A41.9] 03/27/2023 Yes    Microcytic anemia [D50.9] 03/27/2023 Yes    Diabetic ketoacidosis without coma [E11.10] 03/27/2023 Yes    Hyperlipidemia associated with type 2 diabetes mellitus [E11.69, E78.5] 01/05/2022 Yes    Hypertension associated with diabetes [E11.59, I15.2] 01/05/2022 Yes    Obesity, diabetes, and hypertension syndrome [E11.69, E66.9, E11.59, I15.2] 01/05/2022 Yes    HILDA (latent autoimmune diabetes in adults), managed as type 1 [E13.9] 02/06/2020 Yes      Problems Resolved During this Admission:   Discussed with patient option of continue monitor on iv abx or drainage in OR. I recommend attempt at drainage in OR given his brisk gag reflex and guarding on oral exam . We discussed that needle aspiration may not elicit pus but I would be able to access the area of concern on ct scan better with him under anesthesia therefore would feel more confident that this was phlegmon if I could attempt to drain the area of concern. He would like to proceed with repeat attempt at drainage under anesthesia. Risks, benefits and indications again discussed and all questions answered.     Rebecca Cody MD  Otorhinolaryngology-Head & Neck Surgery  [facility] - Intensive Care

## 2023-03-28 NOTE — ASSESSMENT & PLAN NOTE
CT showing enlarged R palatine tonsil with edema and concern for pharyngitis and peritonsillar abscess. Airway patent. Reactive cervical nodes. DM is a risk factor.   - ENT consulted  - continue clindamycin  - to OR today

## 2023-03-29 LAB
ANION GAP SERPL CALC-SCNC: 9 MMOL/L (ref 8–16)
BASOPHILS # BLD AUTO: 0.03 K/UL (ref 0–0.2)
BASOPHILS NFR BLD: 0.3 % (ref 0–1.9)
BUN SERPL-MCNC: 10 MG/DL (ref 6–20)
CALCIUM SERPL-MCNC: 8.8 MG/DL (ref 8.7–10.5)
CHLORIDE SERPL-SCNC: 104 MMOL/L (ref 95–110)
CO2 SERPL-SCNC: 24 MMOL/L (ref 23–29)
CREAT SERPL-MCNC: 0.9 MG/DL (ref 0.5–1.4)
DIFFERENTIAL METHOD: ABNORMAL
EOSINOPHIL # BLD AUTO: 0.1 K/UL (ref 0–0.5)
EOSINOPHIL NFR BLD: 1.3 % (ref 0–8)
ERYTHROCYTE [DISTWIDTH] IN BLOOD BY AUTOMATED COUNT: 12.9 % (ref 11.5–14.5)
EST. GFR  (NO RACE VARIABLE): >60 ML/MIN/1.73 M^2
GLUCOSE SERPL-MCNC: 198 MG/DL (ref 70–110)
HCT VFR BLD AUTO: 34.2 % (ref 40–54)
HGB BLD-MCNC: 11.4 G/DL (ref 14–18)
IMM GRANULOCYTES # BLD AUTO: 0.04 K/UL (ref 0–0.04)
IMM GRANULOCYTES NFR BLD AUTO: 0.5 % (ref 0–0.5)
LYMPHOCYTES # BLD AUTO: 1.4 K/UL (ref 1–4.8)
LYMPHOCYTES NFR BLD: 16.2 % (ref 18–48)
MCH RBC QN AUTO: 26.4 PG (ref 27–31)
MCHC RBC AUTO-ENTMCNC: 33.3 G/DL (ref 32–36)
MCV RBC AUTO: 79 FL (ref 82–98)
MONOCYTES # BLD AUTO: 0.6 K/UL (ref 0.3–1)
MONOCYTES NFR BLD: 7 % (ref 4–15)
NEUTROPHILS # BLD AUTO: 6.4 K/UL (ref 1.8–7.7)
NEUTROPHILS NFR BLD: 74.7 % (ref 38–73)
NRBC BLD-RTO: 0 /100 WBC
PHOSPHATE SERPL-MCNC: 3.6 MG/DL (ref 2.7–4.5)
PLATELET # BLD AUTO: 326 K/UL (ref 150–450)
PMV BLD AUTO: 9.4 FL (ref 9.2–12.9)
POCT GLUCOSE: 188 MG/DL (ref 70–110)
POCT GLUCOSE: 202 MG/DL (ref 70–110)
POCT GLUCOSE: 216 MG/DL (ref 70–110)
POCT GLUCOSE: 235 MG/DL (ref 70–110)
POTASSIUM SERPL-SCNC: 4.1 MMOL/L (ref 3.5–5.1)
RBC # BLD AUTO: 4.32 M/UL (ref 4.6–6.2)
SODIUM SERPL-SCNC: 137 MMOL/L (ref 136–145)
WBC # BLD AUTO: 8.58 K/UL (ref 3.9–12.7)

## 2023-03-29 PROCEDURE — 80048 BASIC METABOLIC PNL TOTAL CA: CPT | Performed by: HOSPITALIST

## 2023-03-29 PROCEDURE — 84100 ASSAY OF PHOSPHORUS: CPT | Performed by: HOSPITALIST

## 2023-03-29 PROCEDURE — 25000003 PHARM REV CODE 250: Performed by: HOSPITALIST

## 2023-03-29 PROCEDURE — 11000001 HC ACUTE MED/SURG PRIVATE ROOM

## 2023-03-29 PROCEDURE — 85025 COMPLETE CBC W/AUTO DIFF WBC: CPT | Performed by: HOSPITALIST

## 2023-03-29 PROCEDURE — 36415 COLL VENOUS BLD VENIPUNCTURE: CPT | Performed by: HOSPITALIST

## 2023-03-29 PROCEDURE — 63600175 PHARM REV CODE 636 W HCPCS: Performed by: HOSPITALIST

## 2023-03-29 PROCEDURE — A4216 STERILE WATER/SALINE, 10 ML: HCPCS | Performed by: HOSPITALIST

## 2023-03-29 RX ORDER — INSULIN ASPART 100 [IU]/ML
20 INJECTION, SOLUTION INTRAVENOUS; SUBCUTANEOUS
Status: DISCONTINUED | OUTPATIENT
Start: 2023-03-29 | End: 2023-03-30 | Stop reason: HOSPADM

## 2023-03-29 RX ORDER — HEPARIN SODIUM 5000 [USP'U]/ML
5000 INJECTION, SOLUTION INTRAVENOUS; SUBCUTANEOUS EVERY 8 HOURS
Status: DISCONTINUED | OUTPATIENT
Start: 2023-03-29 | End: 2023-03-30 | Stop reason: HOSPADM

## 2023-03-29 RX ADMIN — MUPIROCIN: 20 OINTMENT TOPICAL at 09:03

## 2023-03-29 RX ADMIN — Medication 10 ML: at 05:03

## 2023-03-29 RX ADMIN — CLINDAMYCIN PHOSPHATE 600 MG: 150 INJECTION, SOLUTION INTRAVENOUS at 12:03

## 2023-03-29 RX ADMIN — INSULIN ASPART 2 UNITS: 100 INJECTION, SOLUTION INTRAVENOUS; SUBCUTANEOUS at 07:03

## 2023-03-29 RX ADMIN — INSULIN DETEMIR 60 UNITS: 100 INJECTION, SOLUTION SUBCUTANEOUS at 08:03

## 2023-03-29 RX ADMIN — HEPARIN SODIUM 5000 UNITS: 5000 INJECTION INTRAVENOUS; SUBCUTANEOUS at 02:03

## 2023-03-29 RX ADMIN — INSULIN ASPART 2 UNITS: 100 INJECTION, SOLUTION INTRAVENOUS; SUBCUTANEOUS at 04:03

## 2023-03-29 RX ADMIN — INSULIN ASPART 1 UNITS: 100 INJECTION, SOLUTION INTRAVENOUS; SUBCUTANEOUS at 09:03

## 2023-03-29 RX ADMIN — MUPIROCIN: 20 OINTMENT TOPICAL at 08:03

## 2023-03-29 RX ADMIN — INSULIN ASPART 20 UNITS: 100 INJECTION, SOLUTION INTRAVENOUS; SUBCUTANEOUS at 11:03

## 2023-03-29 RX ADMIN — INSULIN ASPART 15 UNITS: 100 INJECTION, SOLUTION INTRAVENOUS; SUBCUTANEOUS at 07:03

## 2023-03-29 RX ADMIN — Medication 10 ML: at 10:03

## 2023-03-29 RX ADMIN — LISINOPRIL 2.5 MG: 2.5 TABLET ORAL at 08:03

## 2023-03-29 RX ADMIN — CLINDAMYCIN PHOSPHATE 600 MG: 150 INJECTION, SOLUTION INTRAVENOUS at 03:03

## 2023-03-29 RX ADMIN — HEPARIN SODIUM 5000 UNITS: 5000 INJECTION INTRAVENOUS; SUBCUTANEOUS at 09:03

## 2023-03-29 RX ADMIN — ATORVASTATIN CALCIUM 40 MG: 40 TABLET, FILM COATED ORAL at 08:03

## 2023-03-29 RX ADMIN — INSULIN ASPART 20 UNITS: 100 INJECTION, SOLUTION INTRAVENOUS; SUBCUTANEOUS at 04:03

## 2023-03-29 RX ADMIN — ACETAMINOPHEN 650 MG: 325 TABLET ORAL at 08:03

## 2023-03-29 RX ADMIN — CLINDAMYCIN PHOSPHATE 600 MG: 150 INJECTION, SOLUTION INTRAVENOUS at 07:03

## 2023-03-29 NOTE — SUBJECTIVE & OBJECTIVE
Interval History: No throat pain, voice almost back to normal, tolerating diet, no cough, no coughing up blood. Glucose control improving. Wife at bedside.     Review of Systems   Constitutional:  Negative for chills and fever.   HENT:  Positive for voice change. Negative for congestion, postnasal drip, sinus pressure, sinus pain, sore throat and trouble swallowing.    Respiratory:  Negative for cough and shortness of breath.    Cardiovascular:  Negative for chest pain and leg swelling.   Gastrointestinal:  Negative for abdominal pain.   Genitourinary:  Negative for difficulty urinating.   Psychiatric/Behavioral:  Negative for confusion.    Objective:     Vital Signs (Most Recent):  Temp: 98.6 °F (37 °C) (03/29/23 0856)  Pulse: 96 (03/29/23 1200)  Resp: (!) 25 (03/29/23 1200)  BP: 128/76 (03/29/23 1100)  SpO2: 100 % (03/29/23 1200) Vital Signs (24h Range):  Temp:  [98.1 °F (36.7 °C)-101.5 °F (38.6 °C)] 98.6 °F (37 °C)  Pulse:  [] 96  Resp:  [15-34] 25  SpO2:  [90 %-100 %] 100 %  BP: (116-157)/(58-89) 128/76     Weight: 98.9 kg (218 lb 0.6 oz)  Body mass index is 34.15 kg/m².    Intake/Output Summary (Last 24 hours) at 3/29/2023 1217  Last data filed at 3/29/2023 0000  Gross per 24 hour   Intake 1600 ml   Output 950 ml   Net 650 ml        Physical Exam  Vitals and nursing note reviewed.   Constitutional:       General: He is not in acute distress.     Appearance: He is not ill-appearing or toxic-appearing.   HENT:      Head: Normocephalic and atraumatic.      Nose: Nose normal.      Mouth/Throat:      Mouth: Mucous membranes are moist.      Pharynx: No oropharyngeal exudate or posterior oropharyngeal erythema.   Eyes:      General: No scleral icterus.     Conjunctiva/sclera: Conjunctivae normal.   Cardiovascular:      Rate and Rhythm: Normal rate and regular rhythm.   Pulmonary:      Effort: Pulmonary effort is normal.      Breath sounds: Normal breath sounds.      Comments: Room air  Abdominal:      General:  Bowel sounds are normal.      Palpations: Abdomen is soft.   Musculoskeletal:      Right lower leg: No edema.      Left lower leg: No edema.   Skin:     General: Skin is warm and dry.   Neurological:      Mental Status: He is alert and oriented to person, place, and time. Mental status is at baseline.       Significant Labs: All pertinent labs within the past 24 hours have been reviewed.    Significant Imaging: I have reviewed all pertinent imaging results/findings within the past 24 hours.

## 2023-03-29 NOTE — PROGRESS NOTES
"[facility]  Progress Note    Patient Name: Fco Zafar III  MRN: [id number]  Patient Class: IP- Inpatient   Admission Date: 3/27/2023  Length of Stay: 2 days  Attending Physician: Amrita Olson MD  Primary Care Provider: Paddy Torre        Subjective:     Principal Problem:Peritonsillar abscess        HPI:  Mr Fco Zafar III is a 37 y.o. man with DM admitted with 3 days of throat swelling and pain. No fevers. Some change to voice. Able to tolerate PO but decreased appetite. No shortness of breath. No nausea/vomiting. No drooling. No dental pain. No prior mouth surgeries.     Presented to [place]. Diagnosed with peritonsillar abscess. ENT unable to drain at bedside. Also noted hyperglycemia glucose in 400s. He says he has not taken insulin or metformin in last 2 days.       Overview/Hospital Course:  Mr Fco Zafar III was admitted with sepsis, DKA, and peritonsillar abscess. Started clindamycin. ENT consulted. DKA resolved with insulin gtt and IVF. To OR on 3/28, noted "pus aspirated from peritonsillar space , incision made and copious purulent material from peritonsillar space extending into parapharyngeal space. Edematous area of lateral pharynx aspirated but no additional pus." Culture with group A Strep.        Interval History: No throat pain, voice almost back to normal, tolerating diet, no cough, no coughing up blood. Glucose control improving. Wife at bedside.     Review of Systems   Constitutional:  Negative for chills and fever.   HENT:  Positive for voice change. Negative for congestion, postnasal drip, sinus pressure, sinus pain, sore throat and trouble swallowing.    Respiratory:  Negative for cough and shortness of breath.    Cardiovascular:  Negative for chest pain and leg swelling.   Gastrointestinal:  Negative for abdominal pain.   Genitourinary:  Negative for difficulty urinating.   Psychiatric/Behavioral:  Negative for confusion.    Objective: "     Vital Signs (Most Recent):  Temp: 98.6 °F (37 °C) (03/29/23 0856)  Pulse: 96 (03/29/23 1200)  Resp: (!) 25 (03/29/23 1200)  BP: 128/76 (03/29/23 1100)  SpO2: 100 % (03/29/23 1200) Vital Signs (24h Range):  Temp:  [98.1 °F (36.7 °C)-101.5 °F (38.6 °C)] 98.6 °F (37 °C)  Pulse:  [] 96  Resp:  [15-34] 25  SpO2:  [90 %-100 %] 100 %  BP: (116-157)/(58-89) 128/76     Weight: 98.9 kg (218 lb 0.6 oz)  Body mass index is 34.15 kg/m².    Intake/Output Summary (Last 24 hours) at 3/29/2023 1217  Last data filed at 3/29/2023 0000  Gross per 24 hour   Intake 1600 ml   Output 950 ml   Net 650 ml        Physical Exam  Vitals and nursing note reviewed.   Constitutional:       General: He is not in acute distress.     Appearance: He is not ill-appearing or toxic-appearing.   HENT:      Head: Normocephalic and atraumatic.      Nose: Nose normal.      Mouth/Throat:      Mouth: Mucous membranes are moist.      Pharynx: No oropharyngeal exudate or posterior oropharyngeal erythema.   Eyes:      General: No scleral icterus.     Conjunctiva/sclera: Conjunctivae normal.   Cardiovascular:      Rate and Rhythm: Normal rate and regular rhythm.   Pulmonary:      Effort: Pulmonary effort is normal.      Breath sounds: Normal breath sounds.      Comments: Room air  Abdominal:      General: Bowel sounds are normal.      Palpations: Abdomen is soft.   Musculoskeletal:      Right lower leg: No edema.      Left lower leg: No edema.   Skin:     General: Skin is warm and dry.   Neurological:      Mental Status: He is alert and oriented to person, place, and time. Mental status is at baseline.       Significant Labs: All pertinent labs within the past 24 hours have been reviewed.    Significant Imaging: I have reviewed all pertinent imaging results/findings within the past 24 hours.      Assessment/Plan:      * Peritonsillar abscess  CT showing enlarged R palatine tonsil with edema and concern for pharyngitis and peritonsillar abscess. Airway  patent. Reactive cervical nodes. DM is a risk factor.   - ENT consulted- to OR 3/28 for I&D. Cultures growing GAS  - continue clindamycin  - ENT follow up on discharge     Diabetic ketoacidosis without coma  Presented with DKA  A, bicarb: 17, pH: not checked, betahydroxybutyrate: 4.4, urine ketones: not checked  Cause of DKA: peritonsillar abscess and lack on insulin x2 days prior to admit  - treated with insulin gtt, IVF  - this is resolved       Microcytic anemia  Suspect iron deficiency  Follow up with PCP for further workup      Sepsis  This patient does have evidence of infective focus. My overall impression is sepsis. Source: peritonsillar abscess and pharyngitis  Antibiotics given-   Antibiotics (72h ago, onward)    Start     Stop Route Frequency Ordered    23 1100  clindamycin in D5W 600 mg/50 mL IVPB 600 mg         -- IV Every 8 hours (non-standard times) 23 0736    23 0900  mupirocin 2 % ointment          0859 Nasl 2 times daily 23 0729        Latest lactate reviewed- normal  Organ dysfunction indicated by none    Fluid challenge Actual Body weight- Patient will receive 30ml/kg actual body weight to calculate fluid bolus for treatment of septic shock. Received 3L NS. Post- resuscitation assessment Yes Perfusion exam was performed within 6 hours of septic shock presentation after bolus shows Adequate tissue perfusion assessed by non-invasive monitoring     Source control achieved by: antibiotics, ENT consult, OR today  Sepsis is resolved     Obesity, diabetes, and hypertension syndrome  Body mass index is 34.15 kg/m². Morbid obesity complicates all aspects of disease management from diagnostic modalities to treatment. Weight loss encouraged and health benefits explained to patient.         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  Last A1c reviewed-   Lab Results   Component Value Date    HGBA1C >14.0 (H) 03/27/2023     Most recent fingerstick glucose reviewed-   Recent Labs   Lab 03/28/23  1700 03/28/23  2138 03/29/23  0746 03/29/23  1133   POCTGLUCOSE 303* 231* 202* 188*     Current correctional scale  Low  Increase anti-hyperglycemic dose as follows-   Antihyperglycemics (From admission, onward)    Start     Stop Route Frequency Ordered    03/29/23 1130  insulin aspart U-100 pen 20 Units         -- SubQ 3 times daily with meals 03/29/23 0809    03/29/23 0900  insulin detemir U-100 (Levemir) pen 60 Units         -- SubQ Daily 03/29/23 0809    03/28/23 1524  insulin aspart U-100 pen 0-5 Units         -- SubQ Before meals & nightly PRN 03/28/23 1424        Hold Oral hypoglycemics while patient is in the hospital- on metformin, trulicity also as outpatient       VTE Risk Mitigation (From admission, onward)         Ordered     heparin (porcine) injection 5,000 Units  Every 8 hours         03/29/23 0810     Place THIERNO hose  Until discontinued         03/27/23 1429     Place sequential compression device  Until discontinued         03/27/23 1429     IP VTE HIGH RISK PATIENT  Once         03/27/23 1429                Discharge Planning   VIOLETA: 3/30/2023     Code Status: Full Code   Is the patient medically ready for discharge?:     Reason for patient still in hospital (select all that apply): Patient trending condition  Discharge Plan A: Home   Discharge Delays: None known at this time        Amrita Olson MD  Department of Hospital Medicine   [facility] [place] - Intensive Care

## 2023-03-29 NOTE — ASSESSMENT & PLAN NOTE
Patient's FSGs are uncontrolled due to hyperglycemia on current medication regimen.  Last A1c reviewed-   Lab Results   Component Value Date    HGBA1C >14.0 (H) 03/27/2023     Most recent fingerstick glucose reviewed-   Recent Labs   Lab 03/28/23  1700 03/28/23  2138 03/29/23  0746 03/29/23  1133   POCTGLUCOSE 303* 231* 202* 188*     Current correctional scale  Low  Increase anti-hyperglycemic dose as follows-   Antihyperglycemics (From admission, onward)    Start     Stop Route Frequency Ordered    03/29/23 1130  insulin aspart U-100 pen 20 Units         -- SubQ 3 times daily with meals 03/29/23 0809    03/29/23 0900  insulin detemir U-100 (Levemir) pen 60 Units         -- SubQ Daily 03/29/23 0809    03/28/23 1524  insulin aspart U-100 pen 0-5 Units         -- SubQ Before meals & nightly PRN 03/28/23 1424        Hold Oral hypoglycemics while patient is in the hospital- on metformin, trulicity also as outpatient

## 2023-03-29 NOTE — PROGRESS NOTES
[facility] [place] Intensive Care  Otorhinolaryngology-Head & Neck Surgery  Progress Note    Patient Name: Fco Zafar III  MRN: [id number]  Code Status: Full Code  Admission Date: 3/27/2023  Hospital Length of Stay: 2 days  Attending Physician: Amrita Olson MD  Primary Care Provider: Paddy Torre    Subjective:     Interval History: feeling much better after procedure yesterday. No throat pain or issues swallowing    Post-Op Info:  Procedure(s) (LRB):  INCISION AND DRAINAGE PARAPHARYNGEAL ABSCESS (Right)   1 Day Post-Op  Hospital Day: 3      Medications:  Continuous Infusions:  Scheduled Meds:   atorvastatin  40 mg Oral Daily    clindamycin in D5W  600 mg Intravenous Q8H    heparin (porcine)  5,000 Units Subcutaneous Q8H    insulin aspart U-100  20 Units Subcutaneous TIDWM    insulin detemir U-100  60 Units Subcutaneous Daily    lisinopriL  2.5 mg Oral Daily    mupirocin   Nasal BID    senna-docusate 8.6-50 mg  1 tablet Oral BID    sodium chloride 0.9%  10 mL Intravenous Q8H     PRN Meds:acetaminophen, dextrose 10%, dextrose 10%, dextrose 10%, dextrose 10%, dextrose 10%, dextrose 10%, dextrose, dextrose, glucagon (human recombinant), glucagon (human recombinant), insulin aspart U-100, melatonin, naloxone, ondansetron, prochlorperazine, sodium chloride 0.9%     Objective:     Vital Signs (24h Range):  Temp:  [98.1 °F (36.7 °C)-101.5 °F (38.6 °C)] 98.6 °F (37 °C)  Pulse:  [] 86  Resp:  [15-34] 15  SpO2:  [90 %-97 %] 91 %  BP: (116-157)/(58-89) 128/76       Lines/Drains/Airways       Peripheral Intravenous Line  Duration                  Peripheral IV - Single Lumen 03/27/23 0843 20 G Left Antecubital 2 days         Peripheral IV - Single Lumen 03/27/23 1820 20 G Anterior;Left Upper Arm 1 day                    Physical Exam  HENT:      Head: Normocephalic.      Comments: Incision site right peritonsillar region without pus or bleeding. Edema resolved and tonsil smaller in size.      Mouth/Throat:      Mouth:  Mucous membranes are moist.      Pharynx: No oropharyngeal exudate or posterior oropharyngeal erythema.   Neck:      Trachea: Trachea normal.   Pulmonary:      Effort: Pulmonary effort is normal.      Breath sounds: No stridor.      Comments: Voice normal   Neurological:      Mental Status: He is alert.       Significant Labs:  CBC:   Recent Labs   Lab 03/29/23  0343   WBC 8.58   RBC 4.32*   HGB 11.4*   HCT 34.2*      MCV 79*   MCH 26.4*   MCHC 33.3         Assessment/Plan:     Active Diagnoses:    Diagnosis Date Noted POA    PRINCIPAL PROBLEM:  Peritonsillar abscess [J36] 03/27/2023 Yes    Sepsis [A41.9] 03/27/2023 Yes    Microcytic anemia [D50.9] 03/27/2023 Yes    Diabetic ketoacidosis without coma [E11.10] 03/27/2023 Yes    Hyperlipidemia associated with type 2 diabetes mellitus [E11.69, E78.5] 01/05/2022 Yes    Hypertension associated with diabetes [E11.59, I15.2] 01/05/2022 Yes    Obesity, diabetes, and hypertension syndrome [E11.69, E66.9, E11.59, I15.2] 01/05/2022 Yes    HILDA (latent autoimmune diabetes in adults), managed as type 1 [E13.9] 02/06/2020 Yes      Problems Resolved During this Admission:     Prelim aerobic culture -strep pyogenes; anerobic culture pending  Wbc normalized and pt feeling better and exam significantly improved  Counseled pt if gets another peritonsillar abscess in the future would recommend tonsillectomy once infection resolved but since he has only had this once would not recommend tonsillectomy at this time  Ok for regular diet  Recommend complete 10-14 days of antibiotics PO when discharged  Can f/u ent clinic as needed.     Rebecca Cody MD  Otorhinolaryngology-Head & Neck Surgery  [facility] [place] - Intensive Care

## 2023-03-29 NOTE — PLAN OF CARE
[place] - Intensive Care  Discharge Reassessment    Primary Care Provider: Paddy Torre    Expected Discharge Date: 3/30/2023    Reassessment (most recent)       Discharge Reassessment - 03/29/23 1041          Discharge Reassessment    Assessment Type Discharge Planning Reassessment     Did the patient's condition or plan change since previous assessment? Yes     Discharge Plan discussed with: --   Chart Review    Communicated VIOLETA with patient/caregiver No     Discharge Plan A Home     Discharge Plan B Home with family     DME Needed Upon Discharge  none     Discharge Barriers Identified None     Why the patient remains in the hospital Requires continued medical care        Post-Acute Status    Coverage [employer]     Discharge Delays None known at this time                 This patient has been screened for Case Management needs.  Based on (documentation in medical record), patient remains in ICU s/p INCISION AND DRAINAGE PARAPHARYNGEAL ABSCESS .  Patient is being followed by Hospital Medicine and ENT.    Case Managment will continue to follow and assist with discharge planning.

## 2023-03-29 NOTE — ASSESSMENT & PLAN NOTE
This patient does have evidence of infective focus. My overall impression is sepsis. Source: peritonsillar abscess and pharyngitis  Antibiotics given-   Antibiotics (72h ago, onward)    Start     Stop Route Frequency Ordered    03/28/23 1100  clindamycin in D5W 600 mg/50 mL IVPB 600 mg         -- IV Every 8 hours (non-standard times) 03/28/23 0736    03/28/23 0900  mupirocin 2 % ointment         04/02 0859 Nasl 2 times daily 03/28/23 0729        Latest lactate reviewed- normal  Organ dysfunction indicated by none    Fluid challenge Actual Body weight- Patient will receive 30ml/kg actual body weight to calculate fluid bolus for treatment of septic shock. Received 3L NS. Post- resuscitation assessment Yes Perfusion exam was performed within 6 hours of septic shock presentation after bolus shows Adequate tissue perfusion assessed by non-invasive monitoring     Source control achieved by: antibiotics, ENT consult, OR today  Sepsis is resolved

## 2023-03-29 NOTE — ASSESSMENT & PLAN NOTE
CT showing enlarged R palatine tonsil with edema and concern for pharyngitis and peritonsillar abscess. Airway patent. Reactive cervical nodes. DM is a risk factor.   - ENT consulted- to OR 3/28 for I&D. Cultures growing GAS  - continue clindamycin  - ENT follow up on discharge

## 2023-03-30 VITALS
WEIGHT: 218.06 LBS | BODY MASS INDEX: 34.22 KG/M2 | RESPIRATION RATE: 28 BRPM | HEART RATE: 98 BPM | HEIGHT: 67 IN | DIASTOLIC BLOOD PRESSURE: 83 MMHG | SYSTOLIC BLOOD PRESSURE: 148 MMHG | TEMPERATURE: 98 F | OXYGEN SATURATION: 94 %

## 2023-03-30 LAB
ANION GAP SERPL CALC-SCNC: 10 MMOL/L (ref 8–16)
BASOPHILS # BLD AUTO: 0.04 K/UL (ref 0–0.2)
BASOPHILS NFR BLD: 0.4 % (ref 0–1.9)
BUN SERPL-MCNC: 8 MG/DL (ref 6–20)
CALCIUM SERPL-MCNC: 8.9 MG/DL (ref 8.7–10.5)
CHLORIDE SERPL-SCNC: 104 MMOL/L (ref 95–110)
CO2 SERPL-SCNC: 25 MMOL/L (ref 23–29)
CREAT SERPL-MCNC: 0.8 MG/DL (ref 0.5–1.4)
DIFFERENTIAL METHOD: ABNORMAL
EOSINOPHIL # BLD AUTO: 0.1 K/UL (ref 0–0.5)
EOSINOPHIL NFR BLD: 1.3 % (ref 0–8)
ERYTHROCYTE [DISTWIDTH] IN BLOOD BY AUTOMATED COUNT: 12.7 % (ref 11.5–14.5)
EST. GFR  (NO RACE VARIABLE): >60 ML/MIN/1.73 M^2
GLUCOSE SERPL-MCNC: 118 MG/DL (ref 70–110)
HCT VFR BLD AUTO: 33.8 % (ref 40–54)
HGB BLD-MCNC: 11.4 G/DL (ref 14–18)
IMM GRANULOCYTES # BLD AUTO: 0.02 K/UL (ref 0–0.04)
IMM GRANULOCYTES NFR BLD AUTO: 0.2 % (ref 0–0.5)
LYMPHOCYTES # BLD AUTO: 2 K/UL (ref 1–4.8)
LYMPHOCYTES NFR BLD: 22.4 % (ref 18–48)
MCH RBC QN AUTO: 26.1 PG (ref 27–31)
MCHC RBC AUTO-ENTMCNC: 33.7 G/DL (ref 32–36)
MCV RBC AUTO: 78 FL (ref 82–98)
MONOCYTES # BLD AUTO: 0.5 K/UL (ref 0.3–1)
MONOCYTES NFR BLD: 6 % (ref 4–15)
NEUTROPHILS # BLD AUTO: 6.3 K/UL (ref 1.8–7.7)
NEUTROPHILS NFR BLD: 69.7 % (ref 38–73)
NRBC BLD-RTO: 0 /100 WBC
PHOSPHATE SERPL-MCNC: 4.2 MG/DL (ref 2.7–4.5)
PLATELET # BLD AUTO: 340 K/UL (ref 150–450)
PMV BLD AUTO: 9.1 FL (ref 9.2–12.9)
POCT GLUCOSE: 192 MG/DL (ref 70–110)
POTASSIUM SERPL-SCNC: 3.5 MMOL/L (ref 3.5–5.1)
RBC # BLD AUTO: 4.36 M/UL (ref 4.6–6.2)
SODIUM SERPL-SCNC: 139 MMOL/L (ref 136–145)
WBC # BLD AUTO: 9.01 K/UL (ref 3.9–12.7)

## 2023-03-30 PROCEDURE — 85025 COMPLETE CBC W/AUTO DIFF WBC: CPT | Performed by: HOSPITALIST

## 2023-03-30 PROCEDURE — 63600175 PHARM REV CODE 636 W HCPCS: Performed by: HOSPITALIST

## 2023-03-30 PROCEDURE — 80048 BASIC METABOLIC PNL TOTAL CA: CPT | Performed by: HOSPITALIST

## 2023-03-30 PROCEDURE — 84100 ASSAY OF PHOSPHORUS: CPT | Performed by: HOSPITALIST

## 2023-03-30 PROCEDURE — 25000003 PHARM REV CODE 250: Performed by: HOSPITALIST

## 2023-03-30 PROCEDURE — 36415 COLL VENOUS BLD VENIPUNCTURE: CPT | Performed by: HOSPITALIST

## 2023-03-30 RX ORDER — INSULIN DEGLUDEC 100 U/ML
50 INJECTION, SOLUTION SUBCUTANEOUS DAILY
Qty: 5 PEN | Refills: 2 | Status: SHIPPED | OUTPATIENT
Start: 2023-03-30 | End: 2023-05-15

## 2023-03-30 RX ORDER — CLINDAMYCIN HYDROCHLORIDE 300 MG/1
300 CAPSULE ORAL EVERY 8 HOURS
Qty: 30 CAPSULE | Refills: 0 | Status: SHIPPED | OUTPATIENT
Start: 2023-03-30 | End: 2023-04-09

## 2023-03-30 RX ADMIN — INSULIN DETEMIR 60 UNITS: 100 INJECTION, SOLUTION SUBCUTANEOUS at 08:03

## 2023-03-30 RX ADMIN — LISINOPRIL 2.5 MG: 2.5 TABLET ORAL at 08:03

## 2023-03-30 RX ADMIN — INSULIN ASPART 20 UNITS: 100 INJECTION, SOLUTION INTRAVENOUS; SUBCUTANEOUS at 07:03

## 2023-03-30 RX ADMIN — MUPIROCIN: 20 OINTMENT TOPICAL at 08:03

## 2023-03-30 RX ADMIN — ATORVASTATIN CALCIUM 40 MG: 40 TABLET, FILM COATED ORAL at 08:03

## 2023-03-30 RX ADMIN — HEPARIN SODIUM 5000 UNITS: 5000 INJECTION INTRAVENOUS; SUBCUTANEOUS at 06:03

## 2023-03-30 RX ADMIN — CLINDAMYCIN PHOSPHATE 600 MG: 150 INJECTION, SOLUTION INTRAVENOUS at 03:03

## 2023-03-30 NOTE — PLAN OF CARE
03/30/23 1106   Final Note   Assessment Type Final Discharge Note   Anticipated Discharge Disposition Home   Hospital Resources/Appts/Education Provided Appointments scheduled and added to AVS   Post-Acute Status   Post-Acute Authorization Other   Other Status No Post-Acute Service Needs   Discharge Delays None known at this time

## 2023-03-30 NOTE — DISCHARGE SUMMARY
"[facility] Medicine  Discharge Summary      Patient Name: Fco Zafar III  MRN: [id number]  ELIZABETH: [id number]  Patient Class: IP- Inpatient  Admission Date: 3/27/2023  Hospital Length of Stay: 3 days  Discharge Date and Time:  03/30/2023 8:16 AM  Attending Physician: Maggy Garibay MD   Discharging Provider: Maggy Garibay MD  Primary Care Provider: Paddy Torre    Primary Care Team: Networked reference to record PCT     HPI:   Mr Fco Zafar III is a 37 y.o. man with DM admitted with 3 days of throat swelling and pain. No fevers. Some change to voice. Able to tolerate PO but decreased appetite. No shortness of breath. No nausea/vomiting. No drooling. No dental pain. No prior mouth surgeries.     Presented to [facility]. Diagnosed with peritonsillar abscess. ENT unable to drain at bedside. Also noted hyperglycemia glucose in 400s. He says he has not taken insulin or metformin in last 2 days.       Procedure(s) (LRB):  INCISION AND DRAINAGE PARAPHARYNGEAL ABSCESS (Right)      Hospital Course:   Mr Fco Zafar III was admitted with sepsis, DKA, and peritonsillar abscess. Started clindamycin. ENT consulted. DKA resolved with insulin gtt and IVF. To OR on 3/28, noted "pus aspirated from peritonsillar space , incision made and copious purulent material from peritonsillar space extending into parapharyngeal space. Edematous area of lateral pharynx aspirated but no additional pus." Culture with group A Strep. Steadily improving. Medically stable for discharge to home. Follow up with ENT.        Goals of Care Treatment Preferences:  Code Status: Full Code      Consults:   Consults (From admission, onward)        Status Ordering Provider     Inpatient consult to Registered Dietitian/Nutritionist  Once        Provider:  (Not yet assigned)    Completed MAGGY GARIBAY          No new Assessment & Plan notes have been filed under this hospital service since the last note was " generated.  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), managed as type 1 [E13.9] 02/06/2020 Yes      Problems Resolved During this Admission:       Discharged Condition: good    Disposition: Home or Self Care    Follow Up: with PCP and ENT    Patient Instructions:      Ambulatory referral/consult to Diabetes Education   Standing Status: Future   Referral Priority: Routine Referral Type: Consultation   Referral Reason: Specialty Services Required   Requested Specialty: Diabetes   Number of Visits Requested: 1 Expiration Date: 03/30/24     Ambulatory referral/consult to ENT   Standing Status: Future   Referral Priority: Routine Referral Type: Consultation   Referral Reason: Specialty Services Required   Requested Specialty: Otolaryngology   Number of Visits Requested: 1     Diet diabetic     Notify your health care provider if you experience any of the following:  temperature >100.4     Notify your health care provider if you experience any of the following:  persistent nausea and vomiting or diarrhea     Notify your health care provider if you experience any of the following:  severe uncontrolled pain     Notify your health care provider if you experience any of the following:  redness, tenderness, or signs of infection (pain, swelling, redness, odor or green/yellow discharge around incision site)     Notify your health care provider if you experience any of the  following:  difficulty breathing or increased cough     Notify your health care provider if you experience any of the following:  severe persistent headache     Notify your health care provider if you experience any of the following:  worsening rash     Notify your health care provider if you experience any of the following:  persistent dizziness, light-headedness, or visual disturbances     Notify your health care provider if you experience any of the following:  increased confusion or weakness       Significant Diagnostic Studies: N/A    Pending Diagnostic Studies:     None         Medications:  Reconciled Home Medications:      Medication List      START taking these medications    clindamycin 300 MG capsule  Commonly known as: CLEOCIN  Take 1 capsule (300 mg total) by mouth every 8 (eight) hours. for 10 days     insulin degludec 100 unit/mL (3 mL) insulin pen  Commonly known as: TRESIBA FLEXTOUCH U-100  Inject 50 units at night.        CHANGE how you take these medications    TRULICITY 1.5 mg/0.5 mL pen injector  Generic drug: dulaglutide  Inject 1.5 mg into the skin every 7 days.  What changed: additional instructions        CONTINUE taking these medications    atorvastatin 40 MG tablet  Commonly known as: LIPITOR  Take 1 tablet (40 mg total) by mouth once daily.     BAQSIMI 3 mg/actuation Spry  Generic drug: glucagon  Give one puff via nostril. Hold device between fingers and thumb, do not push plunger yet, insert tip gently into one nostril until finger(s) touch the outside of the nose, then push plunger firmly all the way in . Dose is complete when the green line disappears.     ciclopirox 0.77 % Crea  Commonly known as: LOPROX  Apply topically 2 (two) times daily.     ciclopirox 8 % Soln  Commonly known as: PENLAC  Apply topically nightly.     diclofenac sodium 1 % Gel  Commonly known as: VOLTAREN  Apply 2 g topically once daily.     FREESTYLE MARGIE 3 SENSOR Mabel  Generic drug: blood-glucose sensor  Change  "every 14 days     insulin aspart U-100 100 unit/mL (3 mL) Inpn pen  Commonly known as: NovoLOG FlexPen U-100 Insulin  Inject 15 units before meals plus scale 150-200+2, 201-250+4, 251-300+6, 301-350+8, >350+10. Max daily 75 units.     lisinopriL 2.5 MG tablet  Commonly known as: PRINIVIL,ZESTRIL  Take 1 tablet (2.5 mg total) by mouth once daily.     metFORMIN 500 MG ER 24hr tablet  Commonly known as: GLUCOPHAGE-XR  Take 1 tablet (500 mg total) by mouth 2 (two) times daily with meals.     MICRO THIN LANCETS 33 gauge Misc  Generic drug: lancets     pen needle, diabetic 32 gauge x 5/32" Ndle  Commonly known as: BD AMELIA 2ND GEN PEN NEEDLE  Use 4x a day  e 10.65            Indwelling Lines/Drains at time of discharge:   Lines/Drains/Airways     None                 Time spent on the discharge of patient: 35 minutes        Amrita Olson MD  Department of Hospital Medicine  [facility] - Intensive Care  "

## 2023-03-30 NOTE — PROGRESS NOTES
03/30/2023      Fco Zafar III  [address] [place] [address]          [facility]  Department of Hospital Medicine  [address] [place] [address].  MELISSA Levine [id number] [phone] (fax)   Principal Diagnosis:  Peritonsillar abscess        Mr Zafar has been treated at [facility] since 3/27/23.  Discharge date:  3/30/23.        Patient may return to work on Tuesday April 4, 2023                           __________________________  Dr GUNNAR Olson / IRINEO Canales-ACM  03/30/2023

## 2023-03-30 NOTE — PLAN OF CARE
Patient given discharge paperwork and education, all questions answered. Work excuse given by Case management. Telemetry monitoring and PIV's removed. Patient transported by wheelchair per RN, accompanied by spouse with personal belongings including cell phone, , and ear buds.

## 2023-04-01 LAB
BACTERIA SPEC AEROBE CULT: ABNORMAL
BACTERIA SPEC ANAEROBE CULT: NORMAL

## 2023-04-03 ENCOUNTER — OFFICE VISIT (OUTPATIENT)
Dept: INTERNAL MEDICINE | Facility: CLINIC | Age: 38
End: 2023-04-03
Payer: COMMERCIAL

## 2023-04-03 ENCOUNTER — PATIENT MESSAGE (OUTPATIENT)
Dept: ADMINISTRATIVE | Facility: HOSPITAL | Age: 38
End: 2023-04-03
Payer: COMMERCIAL

## 2023-04-03 VITALS
OXYGEN SATURATION: 97 % | HEART RATE: 96 BPM | WEIGHT: 216.69 LBS | SYSTOLIC BLOOD PRESSURE: 132 MMHG | BODY MASS INDEX: 34.01 KG/M2 | DIASTOLIC BLOOD PRESSURE: 82 MMHG | HEIGHT: 67 IN

## 2023-04-03 DIAGNOSIS — E10.29 TYPE 1 DIABETES MELLITUS WITH MICROALBUMINURIA: ICD-10-CM

## 2023-04-03 DIAGNOSIS — R80.9 TYPE 1 DIABETES MELLITUS WITH MICROALBUMINURIA: ICD-10-CM

## 2023-04-03 DIAGNOSIS — E66.09 CLASS 1 OBESITY DUE TO EXCESS CALORIES WITH SERIOUS COMORBIDITY AND BODY MASS INDEX (BMI) OF 33.0 TO 33.9 IN ADULT: ICD-10-CM

## 2023-04-03 DIAGNOSIS — Z09 HOSPITAL DISCHARGE FOLLOW-UP: Primary | ICD-10-CM

## 2023-04-03 DIAGNOSIS — Z87.09 HISTORY OF PERITONSILLAR ABSCESS: ICD-10-CM

## 2023-04-03 PROBLEM — E66.811 CLASS 1 OBESITY DUE TO EXCESS CALORIES WITH SERIOUS COMORBIDITY AND BODY MASS INDEX (BMI) OF 33.0 TO 33.9 IN ADULT: Status: ACTIVE | Noted: 2020-02-06

## 2023-04-03 PROBLEM — E11.10 DIABETIC KETOACIDOSIS WITHOUT COMA: Status: RESOLVED | Noted: 2023-03-27 | Resolved: 2023-04-03

## 2023-04-03 PROBLEM — A41.9 SEPSIS: Status: RESOLVED | Noted: 2023-03-27 | Resolved: 2023-04-03

## 2023-04-03 PROCEDURE — 3079F DIAST BP 80-89 MM HG: CPT | Mod: CPTII,S$GLB,, | Performed by: FAMILY MEDICINE

## 2023-04-03 PROCEDURE — 1159F MED LIST DOCD IN RCRD: CPT | Mod: CPTII,S$GLB,, | Performed by: FAMILY MEDICINE

## 2023-04-03 PROCEDURE — 4010F ACE/ARB THERAPY RXD/TAKEN: CPT | Mod: CPTII,S$GLB,, | Performed by: FAMILY MEDICINE

## 2023-04-03 PROCEDURE — 3046F HEMOGLOBIN A1C LEVEL >9.0%: CPT | Mod: CPTII,S$GLB,, | Performed by: FAMILY MEDICINE

## 2023-04-03 PROCEDURE — 3008F BODY MASS INDEX DOCD: CPT | Mod: CPTII,S$GLB,, | Performed by: FAMILY MEDICINE

## 2023-04-03 PROCEDURE — 3046F PR MOST RECENT HEMOGLOBIN A1C LEVEL > 9.0%: ICD-10-PCS | Mod: CPTII,S$GLB,, | Performed by: FAMILY MEDICINE

## 2023-04-03 PROCEDURE — 99496 TRANSITIONAL CARE MANAGE SERVICE 7 DAY DISCHARGE: ICD-10-PCS | Mod: S$GLB,,, | Performed by: FAMILY MEDICINE

## 2023-04-03 PROCEDURE — 99496 TRANSJ CARE MGMT HIGH F2F 7D: CPT | Mod: S$GLB,,, | Performed by: FAMILY MEDICINE

## 2023-04-03 PROCEDURE — 3079F PR MOST RECENT DIASTOLIC BLOOD PRESSURE 80-89 MM HG: ICD-10-PCS | Mod: CPTII,S$GLB,, | Performed by: FAMILY MEDICINE

## 2023-04-03 PROCEDURE — 4010F PR ACE/ARB THEARPY RXD/TAKEN: ICD-10-PCS | Mod: CPTII,S$GLB,, | Performed by: FAMILY MEDICINE

## 2023-04-03 PROCEDURE — 3008F PR BODY MASS INDEX (BMI) DOCUMENTED: ICD-10-PCS | Mod: CPTII,S$GLB,, | Performed by: FAMILY MEDICINE

## 2023-04-03 PROCEDURE — 1159F PR MEDICATION LIST DOCUMENTED IN MEDICAL RECORD: ICD-10-PCS | Mod: CPTII,S$GLB,, | Performed by: FAMILY MEDICINE

## 2023-04-03 PROCEDURE — 3075F PR MOST RECENT SYSTOLIC BLOOD PRESS GE 130-139MM HG: ICD-10-PCS | Mod: CPTII,S$GLB,, | Performed by: FAMILY MEDICINE

## 2023-04-03 PROCEDURE — 3075F SYST BP GE 130 - 139MM HG: CPT | Mod: CPTII,S$GLB,, | Performed by: FAMILY MEDICINE

## 2023-04-03 PROCEDURE — 99999 PR PBB SHADOW E&M-EST. PATIENT-LVL IV: CPT | Mod: PBBFAC,,, | Performed by: FAMILY MEDICINE

## 2023-04-03 PROCEDURE — 99999 PR PBB SHADOW E&M-EST. PATIENT-LVL IV: ICD-10-PCS | Mod: PBBFAC,,, | Performed by: FAMILY MEDICINE

## 2023-04-03 RX ORDER — CICLOPIROX OLAMINE 7.7 MG/G
CREAM TOPICAL 2 TIMES DAILY
Qty: 90 G | Refills: 2 | Status: SHIPPED | OUTPATIENT
Start: 2023-04-03 | End: 2023-07-17

## 2023-04-03 NOTE — PROGRESS NOTES
Subjective:       Patient ID: Fco Zafar III is a 37 y.o. male.    Chief Complaint: Follow-up    HPI  Transitional Care Note    Family and/or Caretaker present at visit?  No.  Diagnostic tests reviewed/disposition: No diagnosic tests pending after this hospitalization.  Disease/illness education: yes  Home health/community services discussion/referrals: Patient does not have home health established from hospital visit.  They do not need home health.  If needed, we will set up home health for the patient.   Establishment or re-establishment of referral orders for community resources: No other necessary community resources.   Discussion with other health care providers: No discussion with other health care providers necessary.     Fco Zafar III is a 37 y.o. male PMHx DM for hosp follow up.     Admit 3/27 - 3/30/23 to [facility]. 3d throat swelling and pain. Dx with peritonsillar abscess. Admit w/ sepsis, DKA, and abscess. Started clinda. ENT consulted. DKA resolved w/ insulin gtt and IVF. Went to OR on 3/28 - incision made and drainage of pus. Culture with group A strep. Stable for discharge with outpt ENT follow up.    Course of clinda 300 q8h for 10d total.    In middle of course of abx  Feeling improvement, appetite normal, swallowing fine, no fevers.      #Cards: HLD  - reg: Lisinopril 2.5 qd; Lipitor 40 qd     #Endo: DM (A1c 1/2023 >14) w/ microalb  - est w/ NP Gutierrez, lv 1/2023 - f/u 4m  - reg: Insulin (Tresiba 40U qhs; Novolog 15U acs), Metformin  bid; Trulicity 1.5 qwk  - adverse effects w/ metformin IR 1000 (GI effects)  - eye exam 10/2022  - foot exam: est w/ podiatry, lv 1/2023  - urine 1/2022, microalb     #BMI 33    Review of Systems   Constitutional:  Negative for chills, fatigue and fever.   HENT:  Negative for congestion, sore throat and trouble swallowing.    Respiratory:  Negative for cough and shortness of breath.    Cardiovascular:  Negative for chest pain and palpitations.    Gastrointestinal:  Negative for abdominal pain, constipation, diarrhea, nausea and vomiting.   Genitourinary:  Negative for dysuria and hematuria.   Musculoskeletal:  Negative for back pain.   Skin:  Negative for rash.   Neurological:  Negative for weakness, numbness and headaches.       Past Medical History:   Diagnosis Date    Diabetes     DKA (diabetic ketoacidoses)     Hyperlipidemia     Hypertension     Sepsis 3/27/2023        Prior to Admission medications    Medication Sig Start Date End Date Taking? Authorizing Provider   atorvastatin (LIPITOR) 40 MG tablet Take 1 tablet (40 mg total) by mouth once daily. 3/28/22   Nirav Lanza MD   blood-glucose sensor (FREESTYLE MARGIE 3 SENSOR) Mabel Change every 14 days 1/9/23   JOHANA Mancuso FNP   ciclopirox (LOPROX) 0.77 % Crea Apply topically 2 (two) times daily. 6/27/22   Jc Hubbard DPM   ciclopirox (PENLAC) 8 % Soln Apply topically nightly. 6/27/22   Jc Hubbard DPM   clindamycin (CLEOCIN) 300 MG capsule Take 1 capsule (300 mg total) by mouth every 8 (eight) hours. for 10 days 3/30/23 4/9/23  Amrita Olson MD   diclofenac sodium (VOLTAREN) 1 % Gel Apply 2 g topically once daily. 1/23/23   Dodie Padron DPM   dulaglutide (TRULICITY) 1.5 mg/0.5 mL pen injector Inject 1.5 mg into the skin every 7 days. 1/9/23 1/9/24  JOHANA Mancuso FNP   glucagon (BAQSIMI) 3 mg/actuation Spry Give one puff via nostril. Hold device between fingers and thumb, do not push plunger yet, insert tip gently into one nostril until finger(s) touch the outside of the nose, then push plunger firmly all the way in . Dose is complete when the green line disappears. 1/9/23   JOHANA Mancuso FNP   insulin aspart U-100 (NOVOLOG FLEXPEN U-100 INSULIN) 100 unit/mL (3 mL) InPn pen Inject 15 units before meals plus scale 150-200+2, 201-250+4, 251-300+6, 301-350+8, >350+10. Max daily 75 units. 1/9/23   Asmita Gutierrez, APRN, FNP   insulin degludec (TRESIBA  "FLEXTOUCH U-100) 100 unit/mL (3 mL) insulin pen Inject 50 Units into the skin once daily. Inject 50 units at night. 3/30/23 6/28/23  Amrita Olson MD   lisinopriL (PRINIVIL,ZESTRIL) 2.5 MG tablet Take 1 tablet (2.5 mg total) by mouth once daily. 3/28/22   Nirav Lanza MD   metFORMIN (GLUCOPHAGE-XR) 500 MG ER 24hr tablet Take 1 tablet (500 mg total) by mouth 2 (two) times daily with meals. 1/9/23 1/9/24  JOHANA Mancuso, [place]   MICRO THIN LANCETS 33 gauge Misc  4/28/20   Historical Provider   pen needle, diabetic (BD AMELIA 2ND GEN PEN NEEDLE) 32 gauge x 5/32" Ndle Use 4x a day  e 10.65 1/9/23   JOHANA Mancuso, [place]   insulin glargine (LANTUS U-100 INSULIN) 100 unit/mL injection Inject 30 Units into the skin 2 (two) times a day. 1/5/22 1/10/22  Nirav Lanza MD        Past medical history, surgical history, and family medical history reviewed and updated as appropriate.    Medications and allergies reviewed.     Objective:          Vitals:    04/03/23 0857   BP: 132/82   BP Location: Left arm   Patient Position: Sitting   Pulse: 96   SpO2: 97%   Weight: 98.3 kg (216 lb 11.4 oz)   Height: 5' 7" (1.702 m)     Body mass index is 33.94 kg/m².  Physical Exam  Vitals and nursing note reviewed.   Constitutional:       General: He is not in acute distress.  Cardiovascular:      Rate and Rhythm: Normal rate and regular rhythm.      Pulses: Normal pulses.      Heart sounds: Normal heart sounds. No murmur heard.  Pulmonary:      Effort: Pulmonary effort is normal. No respiratory distress.   Neurological:      Mental Status: He is alert and oriented to person, place, and time.   Psychiatric:         Mood and Affect: Mood normal.         Behavior: Behavior normal.       Lab Results   Component Value Date    WBC 9.01 03/30/2023    HGB 11.4 (L) 03/30/2023    HCT 33.8 (L) 03/30/2023     03/30/2023    CHOL 235 (H) 01/05/2022    TRIG 235 (H) 01/05/2022    HDL 43 01/05/2022    ALT 10 03/27/2023    AST 11 " 03/27/2023     03/30/2023    K 3.5 03/30/2023     03/30/2023    CREATININE 0.8 03/30/2023    BUN 8 03/30/2023    CO2 25 03/30/2023    TSH 0.966 01/05/2022    GLUF 140 (H) 04/28/2018    HGBA1C >14.0 (H) 03/27/2023       Assessment:       1. Hospital discharge follow-up    2. History of peritonsillar abscess    3. Class 1 obesity due to excess calories with serious comorbidity and body mass index (BMI) of 33.0 to 33.9 in adult    4. Type 1 diabetes mellitus with microalbuminuria          Plan:   1. Hospital discharge follow-up    2. History of peritonsillar abscess  Overview:  Hospitalization 3/2023      3. Class 1 obesity due to excess calories with serious comorbidity and body mass index (BMI) of 33.0 to 33.9 in adult  Overview:  Counseled regarding benefits of healthy diet (goal of 5 or more servings of fruits/veggies daily, water as main drink, increased consumption of healthy fats such as nuts, beans, avocados, olive oil instead of unhealthy fat options) and physical activity (150 minutes of moderate-intensity aerobic activity per week) to improve overall health.        4. Type 1 diabetes mellitus with microalbuminuria  Overview:  Recheck lab  - rec taking insulin more consistently  - cont metformin XR  - cont trulicity  - adverse GI effects w/ metformin IR      Other orders  -     ciclopirox (LOPROX) 0.77 % Crea; Apply topically 2 (two) times daily.  Dispense: 90 g; Refill: 2        Health maintenance reviewed with patient.     No follow-ups on file.    Nirav Lanza MD  Family Medicine / Primary Care  [facility]  4/3/2023

## 2023-05-08 ENCOUNTER — LAB VISIT (OUTPATIENT)
Dept: LAB | Facility: HOSPITAL | Age: 38
End: 2023-05-08
Payer: COMMERCIAL

## 2023-05-08 DIAGNOSIS — E10.29 TYPE 1 DIABETES MELLITUS WITH MICROALBUMINURIA: ICD-10-CM

## 2023-05-08 DIAGNOSIS — R80.9 TYPE 1 DIABETES MELLITUS WITH MICROALBUMINURIA: ICD-10-CM

## 2023-05-08 LAB
ESTIMATED AVG GLUCOSE: 272 MG/DL (ref 68–131)
HBA1C MFR BLD: 11.1 % (ref 4–5.6)

## 2023-05-08 PROCEDURE — 83036 HEMOGLOBIN GLYCOSYLATED A1C: CPT | Performed by: NURSE PRACTITIONER

## 2023-05-08 PROCEDURE — 36415 COLL VENOUS BLD VENIPUNCTURE: CPT | Performed by: NURSE PRACTITIONER

## 2023-05-09 ENCOUNTER — PATIENT MESSAGE (OUTPATIENT)
Dept: INTERNAL MEDICINE | Facility: CLINIC | Age: 38
End: 2023-05-09
Payer: COMMERCIAL

## 2023-05-15 ENCOUNTER — OFFICE VISIT (OUTPATIENT)
Dept: INTERNAL MEDICINE | Facility: CLINIC | Age: 38
End: 2023-05-15
Payer: COMMERCIAL

## 2023-05-15 VITALS
BODY MASS INDEX: 36.13 KG/M2 | SYSTOLIC BLOOD PRESSURE: 124 MMHG | OXYGEN SATURATION: 97 % | WEIGHT: 230.19 LBS | HEART RATE: 102 BPM | HEIGHT: 67 IN | DIASTOLIC BLOOD PRESSURE: 84 MMHG

## 2023-05-15 DIAGNOSIS — I15.2 HYPERTENSION ASSOCIATED WITH DIABETES: ICD-10-CM

## 2023-05-15 DIAGNOSIS — E11.69 HYPERLIPIDEMIA ASSOCIATED WITH TYPE 2 DIABETES MELLITUS: ICD-10-CM

## 2023-05-15 DIAGNOSIS — E66.09 CLASS 1 OBESITY DUE TO EXCESS CALORIES WITH SERIOUS COMORBIDITY AND BODY MASS INDEX (BMI) OF 33.0 TO 33.9 IN ADULT: ICD-10-CM

## 2023-05-15 DIAGNOSIS — E78.5 HYPERLIPIDEMIA ASSOCIATED WITH TYPE 2 DIABETES MELLITUS: ICD-10-CM

## 2023-05-15 DIAGNOSIS — B35.1 ONYCHOMYCOSIS OF MULTIPLE TOENAILS WITH TYPE 2 DIABETES MELLITUS: ICD-10-CM

## 2023-05-15 DIAGNOSIS — E13.9 LADA (LATENT AUTOIMMUNE DIABETES IN ADULTS), MANAGED AS TYPE 1: Primary | ICD-10-CM

## 2023-05-15 DIAGNOSIS — E11.59 HYPERTENSION ASSOCIATED WITH DIABETES: ICD-10-CM

## 2023-05-15 DIAGNOSIS — R80.9 TYPE 1 DIABETES MELLITUS WITH MICROALBUMINURIA: ICD-10-CM

## 2023-05-15 DIAGNOSIS — E11.29 MICROALBUMINURIA DUE TO TYPE 2 DIABETES MELLITUS: ICD-10-CM

## 2023-05-15 DIAGNOSIS — R80.9 MICROALBUMINURIA DUE TO TYPE 2 DIABETES MELLITUS: ICD-10-CM

## 2023-05-15 DIAGNOSIS — E11.69 ONYCHOMYCOSIS OF MULTIPLE TOENAILS WITH TYPE 2 DIABETES MELLITUS: ICD-10-CM

## 2023-05-15 DIAGNOSIS — E10.29 TYPE 1 DIABETES MELLITUS WITH MICROALBUMINURIA: ICD-10-CM

## 2023-05-15 DIAGNOSIS — E10.69 TYPE 1 DIABETES MELLITUS WITH OBESITY: ICD-10-CM

## 2023-05-15 DIAGNOSIS — E66.9 TYPE 1 DIABETES MELLITUS WITH OBESITY: ICD-10-CM

## 2023-05-15 PROCEDURE — 1159F PR MEDICATION LIST DOCUMENTED IN MEDICAL RECORD: ICD-10-PCS | Mod: CPTII,S$GLB,, | Performed by: NURSE PRACTITIONER

## 2023-05-15 PROCEDURE — 3008F PR BODY MASS INDEX (BMI) DOCUMENTED: ICD-10-PCS | Mod: CPTII,S$GLB,, | Performed by: NURSE PRACTITIONER

## 2023-05-15 PROCEDURE — 99999 PR PBB SHADOW E&M-EST. PATIENT-LVL IV: ICD-10-PCS | Mod: PBBFAC,,, | Performed by: NURSE PRACTITIONER

## 2023-05-15 PROCEDURE — 1160F PR REVIEW ALL MEDS BY PRESCRIBER/CLIN PHARMACIST DOCUMENTED: ICD-10-PCS | Mod: CPTII,S$GLB,, | Performed by: NURSE PRACTITIONER

## 2023-05-15 PROCEDURE — 1160F RVW MEDS BY RX/DR IN RCRD: CPT | Mod: CPTII,S$GLB,, | Performed by: NURSE PRACTITIONER

## 2023-05-15 PROCEDURE — 4010F ACE/ARB THERAPY RXD/TAKEN: CPT | Mod: CPTII,S$GLB,, | Performed by: NURSE PRACTITIONER

## 2023-05-15 PROCEDURE — 99214 OFFICE O/P EST MOD 30 MIN: CPT | Mod: S$GLB,,, | Performed by: NURSE PRACTITIONER

## 2023-05-15 PROCEDURE — 99999 PR PBB SHADOW E&M-EST. PATIENT-LVL IV: CPT | Mod: PBBFAC,,, | Performed by: NURSE PRACTITIONER

## 2023-05-15 PROCEDURE — 4010F PR ACE/ARB THEARPY RXD/TAKEN: ICD-10-PCS | Mod: CPTII,S$GLB,, | Performed by: NURSE PRACTITIONER

## 2023-05-15 PROCEDURE — 99214 PR OFFICE/OUTPT VISIT, EST, LEVL IV, 30-39 MIN: ICD-10-PCS | Mod: S$GLB,,, | Performed by: NURSE PRACTITIONER

## 2023-05-15 PROCEDURE — 3074F SYST BP LT 130 MM HG: CPT | Mod: CPTII,S$GLB,, | Performed by: NURSE PRACTITIONER

## 2023-05-15 PROCEDURE — 3079F DIAST BP 80-89 MM HG: CPT | Mod: CPTII,S$GLB,, | Performed by: NURSE PRACTITIONER

## 2023-05-15 PROCEDURE — 95251 PR GLUCOSE MONITOR, 72 HOUR, PHYS INTERP: ICD-10-PCS | Mod: S$GLB,,, | Performed by: NURSE PRACTITIONER

## 2023-05-15 PROCEDURE — 3079F PR MOST RECENT DIASTOLIC BLOOD PRESSURE 80-89 MM HG: ICD-10-PCS | Mod: CPTII,S$GLB,, | Performed by: NURSE PRACTITIONER

## 2023-05-15 PROCEDURE — 3074F PR MOST RECENT SYSTOLIC BLOOD PRESSURE < 130 MM HG: ICD-10-PCS | Mod: CPTII,S$GLB,, | Performed by: NURSE PRACTITIONER

## 2023-05-15 PROCEDURE — 95251 CONT GLUC MNTR ANALYSIS I&R: CPT | Mod: S$GLB,,, | Performed by: NURSE PRACTITIONER

## 2023-05-15 PROCEDURE — 3008F BODY MASS INDEX DOCD: CPT | Mod: CPTII,S$GLB,, | Performed by: NURSE PRACTITIONER

## 2023-05-15 PROCEDURE — 3046F HEMOGLOBIN A1C LEVEL >9.0%: CPT | Mod: CPTII,S$GLB,, | Performed by: NURSE PRACTITIONER

## 2023-05-15 PROCEDURE — 3046F PR MOST RECENT HEMOGLOBIN A1C LEVEL > 9.0%: ICD-10-PCS | Mod: CPTII,S$GLB,, | Performed by: NURSE PRACTITIONER

## 2023-05-15 PROCEDURE — 1159F MED LIST DOCD IN RCRD: CPT | Mod: CPTII,S$GLB,, | Performed by: NURSE PRACTITIONER

## 2023-05-15 RX ORDER — INSULIN GLARGINE 300 U/ML
INJECTION, SOLUTION SUBCUTANEOUS
Qty: 3 PEN | Refills: 6 | Status: SHIPPED | OUTPATIENT
Start: 2023-05-15 | End: 2023-11-06 | Stop reason: SDUPTHER

## 2023-05-15 RX ORDER — SEMAGLUTIDE 0.68 MG/ML
0.5 INJECTION, SOLUTION SUBCUTANEOUS
Qty: 1 EACH | Refills: 5 | Status: SHIPPED | OUTPATIENT
Start: 2023-05-15 | End: 2024-02-06

## 2023-05-15 RX ORDER — INSULIN ASPART 100 [IU]/ML
INJECTION, SOLUTION INTRAVENOUS; SUBCUTANEOUS
Qty: 30 ML | Refills: 6
Start: 2023-05-15 | End: 2023-10-02 | Stop reason: SDUPTHER

## 2023-05-15 NOTE — PATIENT INSTRUCTIONS
Follow up in 3.5 months w/Irielle  A1c urine mac, lipid panel prior - 3.5 months   Dr. Hubbard-podiatry in next 6 weeks   Toujeo max 36 units at night    Novolog (orange/blue) cut 12 units 5-10 mins before meals   Scale use if sugar is > 180    180-230+2, 231-280+4,   Ex if sugar is 250 and eating, give 12+4 units=16 units  Stop trulicity  Start ozempic 0.5 mg weekly  Continue metformin xr 500 mg twice a day     Roger 3   Goal a1c < 8%   Lab Results   Component Value Date    HGBA1C 11.1 (H) 05/08/2023   Last > 14%

## 2023-05-15 NOTE — PROGRESS NOTES
CHIEF COMPLAINT: Type 1 Diabetes/HILDA     HPI: Mr. Fco Zafar III is a 37 y.o. male who was diagnosed with Type 2 DM in 2012,  mg/dl (initial)  Started insulin in 2012.    2/1/2020 c peptide type 1 dm/HILDA diagnosis   [place] issues with omnipod dash and dexcom in the past  Now has adalberto 3  Did not get tresiba  Needs basal insulin   See media:   TIR 15%   Avg 245 mg/dl  Glucose variability 27.8%  A1c went down from >14% to 11.1%   Working on administering prandial insulin before meals.  Hospitalization for oral abscess.   Loss weight then gain weight back.     Lab Results   Component Value Date    HGBA1C 11.1 (H) 05/08/2023     On MDI   Testing 4 times a day  Injections 4 x a day  Type 1 demonstrated an understanding of technology and motivated to use the device correctly. Patients are expected to adhere to a diabetes treatment plan and are capable of recognizing alerts and alarms.    Lowest 59  Highest 387 mg/dl    Reviewed family history  +familial history of dm : mother   Has h/o ED, HTN, HLD.   Has pcp Dr. Lanza now.  +covid 19 3/21/2020, SOB/LUGO     PREVIOUS DIABETES MEDICATIONS TRIED  Metformin   lantus   humalog  Tresiba  Trulicity     CURRENT DIABETIC MEDS: trulicity 1.5 mg weekly , novolog 15-15-15 units ac w/ scale 180-230+2 etc., metformin 500 mg bid, no tresiba      Diabetes Management Status    Statin: Taking  ACE/ARB: Taking    Screening or Prevention Patient's value Goal Complete/Controlled?   HgA1C Testing and Control   Lab Results   Component Value Date    HGBA1C 11.1 (H) 05/08/2023      Annually/Less than 8% No   Lipid profile : 01/05/2022 Annually No   LDL control Lab Results   Component Value Date    LDLCALC 145.0 01/05/2022    Annually/Less than 100 mg/dl  No   Nephropathy screening Lab Results   Component Value Date    LABMICR 16.0 01/05/2022     Lab Results   Component Value Date    PROTEINUA Trace (A) 01/01/2021    Annually No   Blood pressure BP Readings from Last 1 Encounters:  "  05/15/23 124/84    Less than 140/90 Yes   Dilated retinal exam : 10/31/2022 Annually Yes   Foot exam   : 01/23/2023 Annually No     REVIEW OF SYSTEMS  General: no weakness, fatigue, +weight stable 2#  Eyes: no double or blurred vision, eye pain, or redness  Cardiovascular: no chest pain, palpitations, edema, or murmurs.   Respiratory: no cough or dyspnea.   GI: no heartburn, nausea, or changes in bowel patterns; good appetite.   Skin: no rashes, dryness, itching, or reactions at insulin injection sites.  Neuro: +numbness, tingling-improved, tremors, or vertigo.   Endocrine: no polyuria, polydipsia, polyphagia, heat or cold intolerance.     Vital Signs  /84 (BP Location: Left arm, Patient Position: Sitting, BP Method: Large (Manual))   Pulse 102   Ht 5' 7" (1.702 m)   Wt 104.4 kg (230 lb 2.6 oz)   SpO2 97%   BMI 36.05 kg/m²     Hemoglobin A1C   Date Value Ref Range Status   05/08/2023 11.1 (H) 4.0 - 5.6 % Final     Comment:     ADA Screening Guidelines:  5.7-6.4%  Consistent with prediabetes  >or=6.5%  Consistent with diabetes    High levels of fetal hemoglobin interfere with the HbA1C  assay. Heterozygous hemoglobin variants (HbS, HgC, etc)do  not significantly interfere with this assay.   However, presence of multiple variants may affect accuracy.     03/27/2023 >14.0 (H) 4.0 - 5.6 % Final     Comment:     ADA Screening Guidelines:  5.7-6.4%  Consistent with prediabetes  >or=6.5%  Consistent with diabetes    High levels of fetal hemoglobin interfere with the HbA1C  assay. Heterozygous hemoglobin variants (HbS, HgC, etc)do  not significantly interfere with this assay.   However, presence of multiple variants may affect accuracy.     01/03/2023 >14.0 (H) 4.0 - 5.6 % Final     Comment:     ADA Screening Guidelines:  5.7-6.4%  Consistent with prediabetes  >or=6.5%  Consistent with diabetes    High levels of fetal hemoglobin interfere with the HbA1C  assay. Heterozygous hemoglobin variants (HbS, HgC, " etc)do  not significantly interfere with this assay.   However, presence of multiple variants may affect accuracy.          Chemistry        Component Value Date/Time     03/30/2023 0303    K 3.5 03/30/2023 0303     03/30/2023 0303    CO2 25 03/30/2023 0303    BUN 8 03/30/2023 0303    CREATININE 0.8 03/30/2023 0303     (H) 03/30/2023 0303        Component Value Date/Time    CALCIUM 8.9 03/30/2023 0303    ALKPHOS 151 (H) 03/27/2023 1530    AST 11 03/27/2023 1530    ALT 10 03/27/2023 1530    BILITOT 0.4 03/27/2023 1530    ESTGFRAFRICA >60.0 01/05/2022 1131    EGFRNONAA >60.0 01/05/2022 1131           Lab Results   Component Value Date    TSH 0.966 01/05/2022      Lab Results   Component Value Date    CHOL 235 (H) 01/05/2022    CHOL 149 02/01/2020    CHOL 107 (L) 04/28/2018     Lab Results   Component Value Date    HDL 43 01/05/2022    HDL 40 02/01/2020    HDL 31 (L) 04/28/2018     Lab Results   Component Value Date    LDLCALC 145.0 01/05/2022    LDLCALC 91.0 02/01/2020    LDLCALC 67.6 04/28/2018     Lab Results   Component Value Date    TRIG 235 (H) 01/05/2022    TRIG 90 02/01/2020    TRIG 42 04/28/2018     Lab Results   Component Value Date    CHOLHDL 18.3 (L) 01/05/2022    CHOLHDL 26.8 02/01/2020    CHOLHDL 29.0 04/28/2018       PHYSICAL EXAMINATION  Constitutional: Appears well, no distress. Reviewed vitals above.  Eyes: conjunctivae & lids intact; PERRLA, EOMs intact; optic discs   Neck: Supple, trachea midline.   Respiratory: No wheezes, even and unlabored  Cardiovascular: no edema or varicosities  Lymph: deferred  Skin: warm and dry; no injection site reactions, no acanthosis nigracans observed.  Neuro:patient alert and cooperative, normal affect; steady gait.  Psychiatric: judgement & insight intact, orientation of time, place & person intact, memory; mood & affect wnl     Diabetes Foot Exam:   deferred    Assessment/Plan    1. 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 Type 1 diabetes mellitus with microalbuminuria  insulin aspart U-100 (NOVOLOG FLEXPEN U-100 INSULIN) 100 unit/mL (3 mL) InPn pen      3. Type 1 diabetes mellitus with obesity        4. Hyperlipidemia associated with type 2 diabetes mellitus  Lipid Panel      5. Hypertension associated with diabetes        6. Microalbuminuria due to type 2 diabetes mellitus        7. Onychomycosis of multiple toenails with type 2 diabetes mellitus        8. Class 1 obesity due to excess calories with serious comorbidity and body mass index (BMI) of 33.0 to 33.9 in adult        1-8. Follow up in 3.5-4 months w/me  A1c urine mac, lipid prior   A1c goal less than 8%  Stop trulicity   Start ozempic 0.5 mg weekly  Send toujeo max 50 units  Novolog 12-12-12 units ac, 5 units w/ snack   Lab Results   Component Value Date    LDLCALC 145.0 01/05/2022     Above goal   F/u with podiatry prn   Body mass index is 36.05 kg/m².  May increase insulin resistance  Discussed dietary habits, stressors        Follow up in about 3 months (around 8/15/2023).

## 2023-05-16 NOTE — TELEPHONE ENCOUNTER
LM for pt asking him to return a call to review ins info on file.  Attempted PA for semaglutide (OZEMPIC) 0.25 mg or 0.5 mg (2 mg/3 mL) pen injector 3x's by nsg staff but pt is not found in EPA system.

## 2023-07-17 ENCOUNTER — OFFICE VISIT (OUTPATIENT)
Dept: PODIATRY | Facility: CLINIC | Age: 38
End: 2023-07-17
Payer: COMMERCIAL

## 2023-07-17 VITALS — WEIGHT: 230 LBS | HEIGHT: 67 IN | BODY MASS INDEX: 36.1 KG/M2

## 2023-07-17 DIAGNOSIS — E13.9 LADA (LATENT AUTOIMMUNE DIABETES IN ADULTS), MANAGED AS TYPE 1: ICD-10-CM

## 2023-07-17 DIAGNOSIS — B35.1 ONYCHOMYCOSIS DUE TO DERMATOPHYTE: ICD-10-CM

## 2023-07-17 DIAGNOSIS — E11.42 TYPE 2 DIABETES MELLITUS WITH DIABETIC POLYNEUROPATHY, UNSPECIFIED WHETHER LONG TERM INSULIN USE: Primary | ICD-10-CM

## 2023-07-17 DIAGNOSIS — B35.3 TINEA PEDIS OF BOTH FEET: ICD-10-CM

## 2023-07-17 PROCEDURE — 1160F RVW MEDS BY RX/DR IN RCRD: CPT | Mod: CPTII,S$GLB,, | Performed by: PODIATRIST

## 2023-07-17 PROCEDURE — 1159F PR MEDICATION LIST DOCUMENTED IN MEDICAL RECORD: ICD-10-PCS | Mod: CPTII,S$GLB,, | Performed by: PODIATRIST

## 2023-07-17 PROCEDURE — 99999 PR PBB SHADOW E&M-EST. PATIENT-LVL III: ICD-10-PCS | Mod: PBBFAC,,, | Performed by: PODIATRIST

## 2023-07-17 PROCEDURE — 99214 OFFICE O/P EST MOD 30 MIN: CPT | Mod: S$GLB,,, | Performed by: PODIATRIST

## 2023-07-17 PROCEDURE — 4010F PR ACE/ARB THEARPY RXD/TAKEN: ICD-10-PCS | Mod: CPTII,S$GLB,, | Performed by: PODIATRIST

## 2023-07-17 PROCEDURE — 3046F PR MOST RECENT HEMOGLOBIN A1C LEVEL > 9.0%: ICD-10-PCS | Mod: CPTII,S$GLB,, | Performed by: PODIATRIST

## 2023-07-17 PROCEDURE — 1160F PR REVIEW ALL MEDS BY PRESCRIBER/CLIN PHARMACIST DOCUMENTED: ICD-10-PCS | Mod: CPTII,S$GLB,, | Performed by: PODIATRIST

## 2023-07-17 PROCEDURE — 1159F MED LIST DOCD IN RCRD: CPT | Mod: CPTII,S$GLB,, | Performed by: PODIATRIST

## 2023-07-17 PROCEDURE — 4010F ACE/ARB THERAPY RXD/TAKEN: CPT | Mod: CPTII,S$GLB,, | Performed by: PODIATRIST

## 2023-07-17 PROCEDURE — 99999 PR PBB SHADOW E&M-EST. PATIENT-LVL III: CPT | Mod: PBBFAC,,, | Performed by: PODIATRIST

## 2023-07-17 PROCEDURE — 3046F HEMOGLOBIN A1C LEVEL >9.0%: CPT | Mod: CPTII,S$GLB,, | Performed by: PODIATRIST

## 2023-07-17 PROCEDURE — 99214 PR OFFICE/OUTPT VISIT, EST, LEVL IV, 30-39 MIN: ICD-10-PCS | Mod: S$GLB,,, | Performed by: PODIATRIST

## 2023-07-17 PROCEDURE — 3008F BODY MASS INDEX DOCD: CPT | Mod: CPTII,S$GLB,, | Performed by: PODIATRIST

## 2023-07-17 PROCEDURE — 3008F PR BODY MASS INDEX (BMI) DOCUMENTED: ICD-10-PCS | Mod: CPTII,S$GLB,, | Performed by: PODIATRIST

## 2023-07-17 RX ORDER — CICLOPIROX OLAMINE 7.7 MG/G
CREAM TOPICAL 2 TIMES DAILY
Qty: 90 G | Refills: 2 | Status: SHIPPED | OUTPATIENT
Start: 2023-07-17

## 2023-07-17 RX ORDER — CICLOPIROX 80 MG/ML
SOLUTION TOPICAL NIGHTLY
Qty: 6.6 ML | Refills: 11 | Status: SHIPPED | OUTPATIENT
Start: 2023-07-17

## 2023-07-17 NOTE — PROGRESS NOTES
Subjective:      Patient ID: Fco Zafar III is a 37 y.o. male.    Chief Complaint: Diabetes Mellitus (Calijigna ILANA Gutierrez, JOHANA, FNP at 5/15/2023) and Diabetic Foot Exam    Diabetes, increased risk amputation needing evaluation/management/optomization of foot care.     Cc thick long discolored toenails, thick dry skin itching both feet.  Both conditions Gradual onset, worsening over past several weeks, aggravated by increased weight bearing, shoe gear, pressure.  No previous medical treatment.  OTC  med not helping.     Chief Complaint   Patient presents with    Diabetes Mellitus     Asmita PRECIADO Matt, JOHANA, FNP at 5/15/2023    Diabetic Foot Exam       Casual shoes bilateral    Review of Systems   Constitutional: Negative for chills, diaphoresis, fever, malaise/fatigue and night sweats.   Cardiovascular:  Negative for claudication, cyanosis, leg swelling and syncope.   Skin:  Positive for dry skin, itching and nail changes. Negative for color change, rash, suspicious lesions and unusual hair distribution.   Musculoskeletal:  Negative for falls, joint pain, joint swelling, muscle cramps, muscle weakness and stiffness.   Gastrointestinal:  Negative for constipation, diarrhea, nausea and vomiting.   Neurological:  Positive for sensory change. Negative for brief paralysis, disturbances in coordination, focal weakness, numbness, paresthesias and tremors.           Objective:      Physical Exam  Constitutional:       General: He is not in acute distress.     Appearance: He is well-developed. He is not diaphoretic.   Cardiovascular:      Pulses:           Popliteal pulses are 2+ on the right side and 2+ on the left side.        Dorsalis pedis pulses are 2+ on the right side and 2+ on the left side.        Posterior tibial pulses are 2+ on the right side and 2+ on the left side.      Comments: Capillary refill 3 seconds all toes/distal feet, all toes/both feet warm to touch.      Negative lymphadenopathy bilateral  popliteal fossa and tarsal tunnel.      Negavie lower extremity edema bilateral.    Musculoskeletal:      Right ankle: No swelling, deformity, ecchymosis or lacerations. Normal range of motion. Normal pulse.      Right Achilles Tendon: Normal. No defects. Rush's test negative.      Comments: Visible and palpable bunion without pain at dorsomedial 1st metatarsal head left andright.  Hallux abducted left and right partially reducible, tracks laterally without being track bound.  No ecchymosis, erythema, edema, or cardinal signs infection or signs of trauma same foot.    Otherwise, Normal angle, base, station of gait. All ten toes without clubbing, cyanosis, or signs of ischemia.  No pain to palpation bilateral lower extremities.  Range of motion, stability, muscle strength, and muscle tone normal bilateral feet and legs.    Lymphadenopathy:      Lower Body: No right inguinal adenopathy. No left inguinal adenopathy.      Comments: Negative lymphadenopathy bilateral popliteal fossa and tarsal tunnel.    Negative lymphangitic streaking bilateral feet/ankles/legs.   Skin:     General: Skin is warm and dry.      Capillary Refill: Capillary refill takes 2 to 3 seconds.      Coloration: Skin is not pale.      Findings: No abrasion, bruising, burn, ecchymosis, erythema, laceration, lesion or rash.      Nails: There is no clubbing.      Comments: Dry scale with superficial flakes over an erythematous base bottom of both feet without ulceration, drainage, pus, tracking, fluctuance, malodor, or cardinal signs infection.    Toenails 1st, 2nd, 3rd, 4th, 5th  bilateral are hypertrophic thickened 2-3 mm, dystrophic, discolored tanish brown with tan, gray crumbly subungual debris.  Tender to distal nail plate pressure, without periungual skin abnormality of each.       Neurological:      Mental Status: He is alert and oriented to person, place, and time.      Sensory: Sensory deficit present.      Motor: No tremor, atrophy or  abnormal muscle tone.      Gait: Gait normal.      Deep Tendon Reflexes:      Reflex Scores:       Patellar reflexes are 2+ on the right side and 2+ on the left side.       Achilles reflexes are 2+ on the right side and 2+ on the left side.     Comments: Decreased/absent vibratory sensation bilateral feet to 128Hz tuning fork.    Otherwise, Negative tinel sign to percussion sural, superficial peroneal, deep peroneal, saphenous, and posterior tibial nerves right and left ankles and feet.     Psychiatric:         Behavior: Behavior is cooperative.             Assessment:       Encounter Diagnoses   Name Primary?   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    Onychomycosis due to dermatophyte  -     ciclopirox (PENLAC) 8 % Soln; Apply topically nightly.      I counseled the patient on his conditions, their implications and medical management.        - Shoe inspection. Diabetic Foot Education. Patient reminded of the importance of good nutrition and blood sugar control to help prevent podiatric complications of diabetes. Patient instructed on proper foot hygeine. We discussed wearing proper shoe gear, daily foot inspections, never walking without protective shoe gear, never putting sharp instruments to feet, routine podiatric visits at least annually.      Penlac, loprox, shoes, Rx  DM shoes, heat [id number] inserts.        No follow-ups on file.

## 2023-10-02 DIAGNOSIS — R80.9 TYPE 1 DIABETES MELLITUS WITH MICROALBUMINURIA: ICD-10-CM

## 2023-10-02 DIAGNOSIS — E10.29 TYPE 1 DIABETES MELLITUS WITH MICROALBUMINURIA: ICD-10-CM

## 2023-10-02 RX ORDER — INSULIN ASPART 100 [IU]/ML
INJECTION, SOLUTION INTRAVENOUS; SUBCUTANEOUS
Qty: 30 ML | Refills: 6 | Status: SHIPPED | OUTPATIENT
Start: 2023-10-02 | End: 2023-11-06

## 2023-10-03 ENCOUNTER — TELEPHONE (OUTPATIENT)
Dept: INTERNAL MEDICINE | Facility: CLINIC | Age: 38
End: 2023-10-03
Payer: COMMERCIAL

## 2023-11-04 ENCOUNTER — LAB VISIT (OUTPATIENT)
Dept: LAB | Facility: HOSPITAL | Age: 38
End: 2023-11-04
Payer: COMMERCIAL

## 2023-11-04 DIAGNOSIS — E11.69 HYPERLIPIDEMIA ASSOCIATED WITH TYPE 2 DIABETES MELLITUS: ICD-10-CM

## 2023-11-04 DIAGNOSIS — E13.9 LADA (LATENT AUTOIMMUNE DIABETES IN ADULTS), MANAGED AS TYPE 1: ICD-10-CM

## 2023-11-04 DIAGNOSIS — E78.5 HYPERLIPIDEMIA ASSOCIATED WITH TYPE 2 DIABETES MELLITUS: ICD-10-CM

## 2023-11-04 LAB
CHOLEST SERPL-MCNC: 153 MG/DL (ref 120–199)
CHOLEST/HDLC SERPL: 4.4 {RATIO} (ref 2–5)
ESTIMATED AVG GLUCOSE: 321 MG/DL (ref 68–131)
HBA1C MFR BLD: 12.8 % (ref 4–5.6)
HDLC SERPL-MCNC: 35 MG/DL (ref 40–75)
HDLC SERPL: 22.9 % (ref 20–50)
LDLC SERPL CALC-MCNC: 102.4 MG/DL (ref 63–159)
NONHDLC SERPL-MCNC: 118 MG/DL
TRIGL SERPL-MCNC: 78 MG/DL (ref 30–150)

## 2023-11-04 PROCEDURE — 80061 LIPID PANEL: CPT | Performed by: NURSE PRACTITIONER

## 2023-11-04 PROCEDURE — 36415 COLL VENOUS BLD VENIPUNCTURE: CPT | Performed by: NURSE PRACTITIONER

## 2023-11-04 PROCEDURE — 83036 HEMOGLOBIN GLYCOSYLATED A1C: CPT | Performed by: NURSE PRACTITIONER

## 2023-11-06 ENCOUNTER — OFFICE VISIT (OUTPATIENT)
Dept: INTERNAL MEDICINE | Facility: CLINIC | Age: 38
End: 2023-11-06
Payer: COMMERCIAL

## 2023-11-06 VITALS
OXYGEN SATURATION: 96 % | HEIGHT: 67 IN | HEART RATE: 89 BPM | SYSTOLIC BLOOD PRESSURE: 128 MMHG | DIASTOLIC BLOOD PRESSURE: 92 MMHG | WEIGHT: 226 LBS | BODY MASS INDEX: 35.47 KG/M2

## 2023-11-06 DIAGNOSIS — E10.29 TYPE 1 DIABETES MELLITUS WITH MICROALBUMINURIA: Primary | ICD-10-CM

## 2023-11-06 DIAGNOSIS — E66.9 TYPE 1 DIABETES MELLITUS WITH OBESITY: ICD-10-CM

## 2023-11-06 DIAGNOSIS — E10.29 MICROALBUMINURIA DUE TO TYPE 1 DIABETES MELLITUS: ICD-10-CM

## 2023-11-06 DIAGNOSIS — E10.69 TYPE 1 DIABETES MELLITUS WITH OBESITY: ICD-10-CM

## 2023-11-06 DIAGNOSIS — E78.5 HYPERLIPIDEMIA ASSOCIATED WITH TYPE 2 DIABETES MELLITUS: ICD-10-CM

## 2023-11-06 DIAGNOSIS — E11.69 HYPERLIPIDEMIA ASSOCIATED WITH TYPE 2 DIABETES MELLITUS: ICD-10-CM

## 2023-11-06 DIAGNOSIS — E66.09 CLASS 1 OBESITY DUE TO EXCESS CALORIES WITH SERIOUS COMORBIDITY AND BODY MASS INDEX (BMI) OF 33.0 TO 33.9 IN ADULT: ICD-10-CM

## 2023-11-06 DIAGNOSIS — R80.9 MICROALBUMINURIA DUE TO TYPE 1 DIABETES MELLITUS: ICD-10-CM

## 2023-11-06 DIAGNOSIS — E13.9 LADA (LATENT AUTOIMMUNE DIABETES IN ADULTS), MANAGED AS TYPE 1: ICD-10-CM

## 2023-11-06 DIAGNOSIS — R80.9 TYPE 1 DIABETES MELLITUS WITH MICROALBUMINURIA: Primary | ICD-10-CM

## 2023-11-06 DIAGNOSIS — E11.59 HYPERTENSION ASSOCIATED WITH DIABETES: ICD-10-CM

## 2023-11-06 DIAGNOSIS — I15.2 HYPERTENSION ASSOCIATED WITH DIABETES: ICD-10-CM

## 2023-11-06 PROCEDURE — 99999 PR PBB SHADOW E&M-EST. PATIENT-LVL IV: CPT | Mod: PBBFAC,,, | Performed by: NURSE PRACTITIONER

## 2023-11-06 PROCEDURE — 3046F HEMOGLOBIN A1C LEVEL >9.0%: CPT | Mod: CPTII,S$GLB,, | Performed by: NURSE PRACTITIONER

## 2023-11-06 PROCEDURE — 3008F BODY MASS INDEX DOCD: CPT | Mod: CPTII,S$GLB,, | Performed by: NURSE PRACTITIONER

## 2023-11-06 PROCEDURE — 1159F PR MEDICATION LIST DOCUMENTED IN MEDICAL RECORD: ICD-10-PCS | Mod: CPTII,S$GLB,, | Performed by: NURSE PRACTITIONER

## 2023-11-06 PROCEDURE — 3046F PR MOST RECENT HEMOGLOBIN A1C LEVEL > 9.0%: ICD-10-PCS | Mod: CPTII,S$GLB,, | Performed by: NURSE PRACTITIONER

## 2023-11-06 PROCEDURE — 1159F MED LIST DOCD IN RCRD: CPT | Mod: CPTII,S$GLB,, | Performed by: NURSE PRACTITIONER

## 2023-11-06 PROCEDURE — 3074F PR MOST RECENT SYSTOLIC BLOOD PRESSURE < 130 MM HG: ICD-10-PCS | Mod: CPTII,S$GLB,, | Performed by: NURSE PRACTITIONER

## 2023-11-06 PROCEDURE — 99215 PR OFFICE/OUTPT VISIT, EST, LEVL V, 40-54 MIN: ICD-10-PCS | Mod: S$GLB,,, | Performed by: NURSE PRACTITIONER

## 2023-11-06 PROCEDURE — 99215 OFFICE O/P EST HI 40 MIN: CPT | Mod: S$GLB,,, | Performed by: NURSE PRACTITIONER

## 2023-11-06 PROCEDURE — 4010F PR ACE/ARB THEARPY RXD/TAKEN: ICD-10-PCS | Mod: CPTII,S$GLB,, | Performed by: NURSE PRACTITIONER

## 2023-11-06 PROCEDURE — 1160F PR REVIEW ALL MEDS BY PRESCRIBER/CLIN PHARMACIST DOCUMENTED: ICD-10-PCS | Mod: CPTII,S$GLB,, | Performed by: NURSE PRACTITIONER

## 2023-11-06 PROCEDURE — 99999 PR PBB SHADOW E&M-EST. PATIENT-LVL IV: ICD-10-PCS | Mod: PBBFAC,,, | Performed by: NURSE PRACTITIONER

## 2023-11-06 PROCEDURE — 3008F PR BODY MASS INDEX (BMI) DOCUMENTED: ICD-10-PCS | Mod: CPTII,S$GLB,, | Performed by: NURSE PRACTITIONER

## 2023-11-06 PROCEDURE — 4010F ACE/ARB THERAPY RXD/TAKEN: CPT | Mod: CPTII,S$GLB,, | Performed by: NURSE PRACTITIONER

## 2023-11-06 PROCEDURE — 3074F SYST BP LT 130 MM HG: CPT | Mod: CPTII,S$GLB,, | Performed by: NURSE PRACTITIONER

## 2023-11-06 PROCEDURE — 3080F PR MOST RECENT DIASTOLIC BLOOD PRESSURE >= 90 MM HG: ICD-10-PCS | Mod: CPTII,S$GLB,, | Performed by: NURSE PRACTITIONER

## 2023-11-06 PROCEDURE — 3080F DIAST BP >= 90 MM HG: CPT | Mod: CPTII,S$GLB,, | Performed by: NURSE PRACTITIONER

## 2023-11-06 PROCEDURE — 1160F RVW MEDS BY RX/DR IN RCRD: CPT | Mod: CPTII,S$GLB,, | Performed by: NURSE PRACTITIONER

## 2023-11-06 RX ORDER — INSULIN ASPART 100 [IU]/ML
INJECTION, SOLUTION INTRAVENOUS; SUBCUTANEOUS
Qty: 30 ML | Refills: 6 | Status: SHIPPED | OUTPATIENT
Start: 2023-11-06 | End: 2024-02-06 | Stop reason: SDUPTHER

## 2023-11-06 RX ORDER — BLOOD-GLUCOSE TRANSMITTER
EACH MISCELLANEOUS
Qty: 1 EACH | Refills: 3 | Status: SHIPPED | OUTPATIENT
Start: 2023-11-06 | End: 2024-02-16

## 2023-11-06 RX ORDER — BLOOD-GLUCOSE SENSOR
EACH MISCELLANEOUS
Qty: 3 EACH | Refills: 11 | Status: SHIPPED | OUTPATIENT
Start: 2023-11-06 | End: 2024-02-16 | Stop reason: SDUPTHER

## 2023-11-06 RX ORDER — INSULIN PMP CART,AUT,G6/7,CNTR
1 EACH SUBCUTANEOUS
Qty: 45 EACH | Refills: 3 | Status: SHIPPED | OUTPATIENT
Start: 2023-11-06 | End: 2024-02-06 | Stop reason: SDUPTHER

## 2023-11-06 RX ORDER — INSULIN PMP CART,AUT,G6/7,CNTR
1 EACH SUBCUTANEOUS CONTINUOUS
Qty: 1 EACH | Refills: 1 | Status: SHIPPED | OUTPATIENT
Start: 2023-11-06 | End: 2024-02-06 | Stop reason: SDUPTHER

## 2023-11-06 RX ORDER — INSULIN ASPART 100 [IU]/ML
INJECTION, SOLUTION INTRAVENOUS; SUBCUTANEOUS
Qty: 30 ML | Refills: 6
Start: 2023-11-06 | End: 2024-02-06

## 2023-11-06 RX ORDER — INSULIN GLARGINE 300 U/ML
INJECTION, SOLUTION SUBCUTANEOUS
Qty: 3 PEN | Refills: 6
Start: 2023-11-06 | End: 2024-02-06 | Stop reason: SDUPTHER

## 2023-11-06 NOTE — PATIENT INSTRUCTIONS
Follow up in 3 months w/Irielle   A1c in 6 weeks  A1c in 3 months   DE --- omnipod 5 /dexcom start     Ozempic 0.5 mg weekly  Toujeo -long acting 26 units at nigh  Snack dose 4 units - novolog  Regular meal 8 units -novolog  Metformin 500 mg twice a day    Scale 150-200+2, 201-250+4,  251-300+6, >300 +8, >350+10    [email or web address]  Eat fit juluis  Myfitnesspal julius  [email or web address]    mySugr julius    Omnipod 5 kit , pods, sensors, transmitter -dexcom g6 from [facility] with novolog vials

## 2023-11-06 NOTE — PROGRESS NOTES
CHIEF COMPLAINT: Type 1 Diabetes/HILDA     HPI: Mr. Fco Zafar III is a 38 y.o. male who was diagnosed with Type 2 DM in 2012,  mg/dl (initial).  Work w/ [employer]/, musician      Started insulin in 2012.    2/1/2020 c peptide type 1 dm/HILDA diagnosis   Started ozempic in the past few weeks  Cutting back portions  Eating at 12n and 9p   1 cup of coffee a day  Coke zero , diet sodas., sugar free gold peak tea  Missing prandial doses  Not wearing adalberto 3 today   Lab Results   Component Value Date    HGBA1C 12.8 (H) 11/04/2023     On MDI   Testing 4 times a day  Injections 4 x a day  Type 1 demonstrated an understanding of technology and motivated to use the device correctly. Patients are expected to adhere to a diabetes treatment plan and are capable of recognizing alerts and alarms.    Reviewed family history  +familial history of dm : mother   Has h/o ED, HTN, HLD.   Has pcp Dr. Lanza now.  +covid 19 3/21/2020, SOB/LUGO     PREVIOUS DIABETES MEDICATIONS TRIED  Metformin   lantus   humalog  Tresiba  Trulicity   ozempic    CURRENT DIABETIC MEDS: ozempic 0.5 mg weekly, novolog 20 units ac 1-2 x a day, toujeo 15-20 units at night?    Diabetes Management Status    Statin: Taking  ACE/ARB: Taking    Screening or Prevention Patient's value Goal Complete/Controlled?   HgA1C Testing and Control   Lab Results   Component Value Date    HGBA1C 12.8 (H) 11/04/2023      Annually/Less than 8% No   Lipid profile : 11/04/2023 Annually No   LDL control Lab Results   Component Value Date    LDLCALC 102.4 11/04/2023    Annually/Less than 100 mg/dl  No   Nephropathy screening Lab Results   Component Value Date    LABMICR 16.0 01/05/2022     Lab Results   Component Value Date    PROTEINUA Trace (A) 01/01/2021    Annually No   Blood pressure BP Readings from Last 1 Encounters:   11/06/23 (!) 132/94    Less than 140/90 Yes   Dilated retinal exam : 10/31/2022 Annually Yes   Foot exam   : 07/17/2023 Annually No  "    REVIEW OF SYSTEMS  General: no weakness, fatigue, +weight fluctuations  Eyes: no double or blurred vision, eye pain, or redness  Cardiovascular: no chest pain, palpitations, edema, or murmurs.   Respiratory: no cough or dyspnea.   GI: no heartburn, nausea, or changes in bowel patterns; good appetite.   Skin: no rashes, dryness, itching, or reactions at insulin injection sites.  Neuro: +numbness, tingling-improved, tremors, or vertigo.   Endocrine: no polyuria, polydipsia, polyphagia, heat or cold intolerance.     Vital Signs  BP (!) 132/94 (BP Location: Left arm, Patient Position: Sitting, BP Method: Medium (Manual))   Pulse 89   Ht 5' 7" (1.702 m)   Wt 102.5 kg (225 lb 15.5 oz)   SpO2 96%   BMI 35.39 kg/m²     Hemoglobin A1C   Date Value Ref Range Status   11/04/2023 12.8 (H) 4.0 - 5.6 % Final     Comment:     ADA Screening Guidelines:  5.7-6.4%  Consistent with prediabetes  >or=6.5%  Consistent with diabetes    High levels of fetal hemoglobin interfere with the HbA1C  assay. Heterozygous hemoglobin variants (HbS, HgC, etc)do  not significantly interfere with this assay.   However, presence of multiple variants may affect accuracy.     05/08/2023 11.1 (H) 4.0 - 5.6 % Final     Comment:     ADA Screening Guidelines:  5.7-6.4%  Consistent with prediabetes  >or=6.5%  Consistent with diabetes    High levels of fetal hemoglobin interfere with the HbA1C  assay. Heterozygous hemoglobin variants (HbS, HgC, etc)do  not significantly interfere with this assay.   However, presence of multiple variants may affect accuracy.     03/27/2023 >14.0 (H) 4.0 - 5.6 % Final     Comment:     ADA Screening Guidelines:  5.7-6.4%  Consistent with prediabetes  >or=6.5%  Consistent with diabetes    High levels of fetal hemoglobin interfere with the HbA1C  assay. Heterozygous hemoglobin variants (HbS, HgC, etc)do  not significantly interfere with this assay.   However, presence of multiple variants may affect accuracy.          " Chemistry        Component Value Date/Time     03/30/2023 0303    K 3.5 03/30/2023 0303     03/30/2023 0303    CO2 25 03/30/2023 0303    BUN 8 03/30/2023 0303    CREATININE 0.8 03/30/2023 0303     (H) 03/30/2023 0303        Component Value Date/Time    CALCIUM 8.9 03/30/2023 0303    ALKPHOS 151 (H) 03/27/2023 1530    AST 11 03/27/2023 1530    ALT 10 03/27/2023 1530    BILITOT 0.4 03/27/2023 1530    ESTGFRAFRICA >60.0 01/05/2022 1131    EGFRNONAA >60.0 01/05/2022 1131           Lab Results   Component Value Date    TSH 0.966 01/05/2022      Lab Results   Component Value Date    CHOL 153 11/04/2023    CHOL 235 (H) 01/05/2022    CHOL 149 02/01/2020     Lab Results   Component Value Date    HDL 35 (L) 11/04/2023    HDL 43 01/05/2022    HDL 40 02/01/2020     Lab Results   Component Value Date    LDLCALC 102.4 11/04/2023    LDLCALC 145.0 01/05/2022    LDLCALC 91.0 02/01/2020     Lab Results   Component Value Date    TRIG 78 11/04/2023    TRIG 235 (H) 01/05/2022    TRIG 90 02/01/2020     Lab Results   Component Value Date    CHOLHDL 22.9 11/04/2023    CHOLHDL 18.3 (L) 01/05/2022    CHOLHDL 26.8 02/01/2020       PHYSICAL EXAMINATION  Constitutional: Appears well, no distress. Reviewed vitals above.  Eyes: conjunctivae & lids intact; PERRLA, EOMs intact; optic discs   Neck: Supple, trachea midline.   Respiratory: No wheezes, even and unlabored  Cardiovascular: no edema or varicosities  Lymph: deferred  Skin: warm and dry; no injection site reactions, no acanthosis nigracans observed.  Neuro:patient alert and cooperative, normal affect; steady gait.  Psychiatric: judgement & insight intact, orientation of time, place & person intact, memory; mood & affect wnl     Diabetes Foot Exam:   deferred    Assessment/Plan    1. Type 1 diabetes mellitus with microalbuminuria  Hemoglobin A1C    Hemoglobin A1C    insulin aspart U-100 (NOVOLOG FLEXPEN U-100 INSULIN) 100 unit/mL (3 mL) InPn pen      2. Type 1 diabetes  mellitus with obesity        3. Hyperlipidemia associated with type 2 diabetes mellitus        4. Hypertension associated with diabetes        5. 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 Microalbuminuria due to type 1 diabetes mellitus        7. Class 1 obesity due to excess calories with serious comorbidity and body mass index (BMI) of 33.0 to 33.9 in adult        1-7. Follow up in 3 months w/me   A1c in 6 weeks  A1c in 3 months   Bp -discussed drinking more water, watching caffeine, on acei  Lab Results   Component Value Date    LDLCALC 102.4 11/04/2023     Above goal     Omnipod 5 kit, pods, dexcom g6 transmitter, sensors- has iphone  Novolog vials- sent to [facility]  Settings needed  Iob 3 hours   Target 120 mg/dl   Basal 0.95 u/hr   Max basal 1.5 u/hr  Bolus icr 1 to 8   Isf 30  Bolus max  20 units  Currently  Change 0.5 u/kg  Toujeo 26 units at night  Novolog 4 units w/ snack 8 units w/ meal > 30 gm of carbs   Scale 150-200+2, etc.  Body mass index is 35.39 kg/m².  May increase insulin resistance     Follow up in about 3 months (around 2/6/2024).       Total time w/ case > 69 mins

## 2023-11-08 ENCOUNTER — PATIENT MESSAGE (OUTPATIENT)
Dept: DIABETES | Facility: CLINIC | Age: 38
End: 2023-11-08
Payer: COMMERCIAL

## 2023-11-08 ENCOUNTER — TELEPHONE (OUTPATIENT)
Dept: DIABETES | Facility: CLINIC | Age: 38
End: 2023-11-08
Payer: COMMERCIAL

## 2023-12-18 ENCOUNTER — LAB VISIT (OUTPATIENT)
Dept: LAB | Facility: HOSPITAL | Age: 38
End: 2023-12-18
Payer: COMMERCIAL

## 2023-12-18 DIAGNOSIS — R80.9 TYPE 1 DIABETES MELLITUS WITH MICROALBUMINURIA: ICD-10-CM

## 2023-12-18 DIAGNOSIS — E10.29 TYPE 1 DIABETES MELLITUS WITH MICROALBUMINURIA: ICD-10-CM

## 2023-12-18 LAB
ESTIMATED AVG GLUCOSE: 249 MG/DL (ref 68–131)
HBA1C MFR BLD: 10.3 % (ref 4–5.6)

## 2023-12-18 PROCEDURE — 36415 COLL VENOUS BLD VENIPUNCTURE: CPT | Performed by: NURSE PRACTITIONER

## 2023-12-18 PROCEDURE — 83036 HEMOGLOBIN GLYCOSYLATED A1C: CPT | Performed by: NURSE PRACTITIONER

## 2024-01-03 ENCOUNTER — PATIENT MESSAGE (OUTPATIENT)
Dept: INTERNAL MEDICINE | Facility: CLINIC | Age: 39
End: 2024-01-03
Payer: COMMERCIAL

## 2024-01-24 ENCOUNTER — PATIENT OUTREACH (OUTPATIENT)
Dept: ADMINISTRATIVE | Facility: HOSPITAL | Age: 39
End: 2024-01-24
Payer: COMMERCIAL

## 2024-01-24 NOTE — PROGRESS NOTES
Health Maintenance Due   Topic Date Due    Pneumococcal Vaccines (Age 0-64) (2 - PCV) 05/25/2017    Diabetes Urine Screening  01/05/2023    Influenza Vaccine (1) 09/01/2023    COVID-19 Vaccine (3 - 2023-24 season) 09/01/2023    Eye Exam  10/31/2023      Chart reviewed. Triggered LINKS. Updated Care Everywhere.     Joanne Castellanos CMA  Population Health Care Coordinator  Primary Care Team

## 2024-01-29 ENCOUNTER — LAB VISIT (OUTPATIENT)
Dept: LAB | Facility: HOSPITAL | Age: 39
End: 2024-01-29
Payer: COMMERCIAL

## 2024-01-29 DIAGNOSIS — E10.29 TYPE 1 DIABETES MELLITUS WITH MICROALBUMINURIA: ICD-10-CM

## 2024-01-29 DIAGNOSIS — R80.9 TYPE 1 DIABETES MELLITUS WITH MICROALBUMINURIA: ICD-10-CM

## 2024-01-29 LAB
ESTIMATED AVG GLUCOSE: 252 MG/DL (ref 68–131)
HBA1C MFR BLD: 10.4 % (ref 4–5.6)

## 2024-01-29 PROCEDURE — 36415 COLL VENOUS BLD VENIPUNCTURE: CPT | Performed by: NURSE PRACTITIONER

## 2024-01-29 PROCEDURE — 83036 HEMOGLOBIN GLYCOSYLATED A1C: CPT | Performed by: NURSE PRACTITIONER

## 2024-02-02 DIAGNOSIS — Z79.4 TYPE 2 DIABETES MELLITUS WITH HYPERGLYCEMIA, WITH LONG-TERM CURRENT USE OF INSULIN: ICD-10-CM

## 2024-02-02 DIAGNOSIS — R80.9 TYPE 1 DIABETES MELLITUS WITH MICROALBUMINURIA: ICD-10-CM

## 2024-02-02 DIAGNOSIS — E10.29 TYPE 1 DIABETES MELLITUS WITH MICROALBUMINURIA: ICD-10-CM

## 2024-02-02 DIAGNOSIS — E11.65 TYPE 2 DIABETES MELLITUS WITH HYPERGLYCEMIA, WITH LONG-TERM CURRENT USE OF INSULIN: ICD-10-CM

## 2024-02-05 RX ORDER — PEN NEEDLE, DIABETIC 32GX 5/32"
NEEDLE, DISPOSABLE MISCELLANEOUS
Qty: 400 EACH | Refills: 3 | Status: SHIPPED | OUTPATIENT
Start: 2024-02-05

## 2024-02-05 NOTE — PROGRESS NOTES
CHIEF COMPLAINT: Type 2 Diabetes/HILDA     HPI: Mr. Fco Zafar III is a 38 y.o. male who was diagnosed with Type 2 DM in 2012,  mg/dl (initial).    Reviewed family history  +familial history of dm : mother   Has h/o ED, HTN, HLD.   +covid 19 3/21/2020, SOB/LUGO   Work w/ [employer]/, musician      Started insulin in 2012.  Stress eating.     2/1/2020 c peptide type 1 dm/HILDA diagnosis   Off toujeo- needs rx  Did not start omnipod 5 yet.   Needs to   Work hours are tough, on the go with [employer].   Watching cpeptide--- 2020. Repeat needed.    A1c remains elevated  Admits to eating sweets, etc.  Currently on ozempic 0.5 mg weekly  1 cup of coffee a day  Coke zero , diet sodas., sugar free gold peak tea  Needs dexcom g6 -off a few days   Lab Results   Component Value Date    HGBA1C 10.4 (H) 01/29/2024     On MDI   Testing 4 times a day  Injections 4 x a day  Type 1 demonstrated an understanding of technology and motivated to use the device correctly. Patients are expected to adhere to a diabetes treatment plan and are capable of recognizing alerts and alarms.    PREVIOUS DIABETES MEDICATIONS TRIED  Metformin   lantus   humalog  Tresiba  Trulicity   ozempic    CURRENT DIABETIC MEDS: ozempic 0.5 mg weekly, novolog 12-15 units ac 2 x a day, toujeo-off currently    Diabetes Management Status    Statin: Taking  ACE/ARB: Taking    Screening or Prevention Patient's value Goal Complete/Controlled?   HgA1C Testing and Control   Lab Results   Component Value Date    HGBA1C 10.4 (H) 01/29/2024      Annually/Less than 8% No   Lipid profile : 11/04/2023 Annually No   LDL control Lab Results   Component Value Date    LDLCALC 102.4 11/04/2023    Annually/Less than 100 mg/dl  No   Nephropathy screening Lab Results   Component Value Date    LABMICR 16.0 01/05/2022     Lab Results   Component Value Date    PROTEINUA Trace (A) 01/01/2021    Annually No   Blood pressure BP Readings from Last 1 Encounters:  "  02/06/24 114/82    Less than 140/90 Yes   Dilated retinal exam : 10/31/2022 Annually Yes   Foot exam   : 07/17/2023 Annually No     REVIEW OF SYSTEMS  General: no weakness, fatigue, +weight fluctuations 16# gain  Eyes: no double or blurred vision, eye pain, or redness  Cardiovascular: no chest pain, palpitations, edema, or murmurs.   Respiratory: no cough or dyspnea.   GI: no heartburn, nausea, or changes in bowel patterns; good appetite.   Skin: no rashes, dryness, itching, or reactions at insulin injection sites.  Neuro: +numbness, tingling-improved, tremors, or vertigo.   Endocrine: no polyuria, polydipsia, polyphagia, heat or cold intolerance.     Vital Signs  /82 (BP Location: Left arm, Patient Position: Sitting, BP Method: Large (Manual))   Pulse 92   Ht 5' 7" (1.702 m)   Wt 109.6 kg (241 lb 10 oz)   SpO2 97%   BMI 37.84 kg/m²     Hemoglobin A1C   Date Value Ref Range Status   01/29/2024 10.4 (H) 4.0 - 5.6 % Final     Comment:     ADA Screening Guidelines:  5.7-6.4%  Consistent with prediabetes  >or=6.5%  Consistent with diabetes    High levels of fetal hemoglobin interfere with the HbA1C  assay. Heterozygous hemoglobin variants (HbS, HgC, etc)do  not significantly interfere with this assay.   However, presence of multiple variants may affect accuracy.     12/18/2023 10.3 (H) 4.0 - 5.6 % Final     Comment:     ADA Screening Guidelines:  5.7-6.4%  Consistent with prediabetes  >or=6.5%  Consistent with diabetes    High levels of fetal hemoglobin interfere with the HbA1C  assay. Heterozygous hemoglobin variants (HbS, HgC, etc)do  not significantly interfere with this assay.   However, presence of multiple variants may affect accuracy.     11/04/2023 12.8 (H) 4.0 - 5.6 % Final     Comment:     ADA Screening Guidelines:  5.7-6.4%  Consistent with prediabetes  >or=6.5%  Consistent with diabetes    High levels of fetal hemoglobin interfere with the HbA1C  assay. Heterozygous hemoglobin variants (HbS, " HgC, etc)do  not significantly interfere with this assay.   However, presence of multiple variants may affect accuracy.          Chemistry        Component Value Date/Time     03/30/2023 0303    K 3.5 03/30/2023 0303     03/30/2023 0303    CO2 25 03/30/2023 0303    BUN 8 03/30/2023 0303    CREATININE 0.8 03/30/2023 0303     (H) 03/30/2023 0303        Component Value Date/Time    CALCIUM 8.9 03/30/2023 0303    ALKPHOS 151 (H) 03/27/2023 1530    AST 11 03/27/2023 1530    ALT 10 03/27/2023 1530    BILITOT 0.4 03/27/2023 1530    ESTGFRAFRICA >60.0 01/05/2022 1131    EGFRNONAA >60.0 01/05/2022 1131           Lab Results   Component Value Date    TSH 0.966 01/05/2022      Lab Results   Component Value Date    CHOL 153 11/04/2023    CHOL 235 (H) 01/05/2022    CHOL 149 02/01/2020     Lab Results   Component Value Date    HDL 35 (L) 11/04/2023    HDL 43 01/05/2022    HDL 40 02/01/2020     Lab Results   Component Value Date    LDLCALC 102.4 11/04/2023    LDLCALC 145.0 01/05/2022    LDLCALC 91.0 02/01/2020     Lab Results   Component Value Date    TRIG 78 11/04/2023    TRIG 235 (H) 01/05/2022    TRIG 90 02/01/2020     Lab Results   Component Value Date    CHOLHDL 22.9 11/04/2023    CHOLHDL 18.3 (L) 01/05/2022    CHOLHDL 26.8 02/01/2020       PHYSICAL EXAMINATION  Constitutional: Appears well, no distress. Reviewed vitals above.  Eyes: conjunctivae & lids intact; PERRLA, EOMs intact; optic discs   Neck: Supple, trachea midline.   Respiratory: No wheezes, even and unlabored  Cardiovascular: no edema or varicosities  Lymph: deferred  Skin: warm and dry; no injection site reactions, no acanthosis nigracans observed.  Neuro:patient alert and cooperative, normal affect; steady gait.  Psychiatric: judgement & insight intact, orientation of time, place & person intact, memory; mood & affect wnl     Diabetes Foot Exam:   deferred    Assessment/Plan    1. Type 2 diabetes mellitus with obesity  Hemoglobin A1C     Ambulatory referral/consult to Diabetes Education    C-PEPTIDE    GLUCOSE, FASTING    GLUTAMIC ACID DECARBOXYLASE    ammonium lactate 12 % Crea      2. Type 1 diabetes mellitus with microalbuminuria  insulin aspart U-100 (NOVOLOG FLEXPEN U-100 INSULIN) 100 unit/mL (3 mL) InPn pen      3. HILDA (latent autoimmune diabetes in adults), managed as type 2        4. Microalbuminuria due to type 2 diabetes mellitus        5. Class 1 obesity due to excess calories with serious comorbidity and body mass index (BMI) of 33.0 to 33.9 in adult        6. Type 2 diabetes mellitus with microalbuminuria, with long-term current use of insulin        7. Dry skin dermatitis  ammonium lactate 12 % Crea      1-7. Follow up in 3 months w/ me   Novolog vials, omnipod 5 kit, pods sent to [facility]  Other meds [facility]   Stop ozempic  Start mounjaro 7.5 mg weekly   Needs DE Lien  Send ammonium lactate cream  Settings needed  Iob 2.5 hours   Target 120 mg/dl   Basal 0.95 u/hr   Max basal 1.5 u/hr  Bolus icr 1 to 6   Isf 25  Bolus max  25 units  Currently  Change 0.5-0.6 u/kg  Toujeo 26 units at night  Novolog 12 units ac w/ scale, snack dose 6 units units w/ snack  Body mass index is 37.84 kg/m².  May increase insulin resistance

## 2024-02-06 ENCOUNTER — PATIENT MESSAGE (OUTPATIENT)
Dept: INTERNAL MEDICINE | Facility: CLINIC | Age: 39
End: 2024-02-06

## 2024-02-06 ENCOUNTER — OFFICE VISIT (OUTPATIENT)
Dept: INTERNAL MEDICINE | Facility: CLINIC | Age: 39
End: 2024-02-06
Payer: COMMERCIAL

## 2024-02-06 VITALS
SYSTOLIC BLOOD PRESSURE: 114 MMHG | HEIGHT: 67 IN | OXYGEN SATURATION: 97 % | BODY MASS INDEX: 37.92 KG/M2 | DIASTOLIC BLOOD PRESSURE: 82 MMHG | HEART RATE: 92 BPM | WEIGHT: 241.63 LBS

## 2024-02-06 DIAGNOSIS — E66.9 TYPE 2 DIABETES MELLITUS WITH OBESITY: Primary | ICD-10-CM

## 2024-02-06 DIAGNOSIS — E11.29 MICROALBUMINURIA DUE TO TYPE 2 DIABETES MELLITUS: ICD-10-CM

## 2024-02-06 DIAGNOSIS — E66.09 CLASS 1 OBESITY DUE TO EXCESS CALORIES WITH SERIOUS COMORBIDITY AND BODY MASS INDEX (BMI) OF 33.0 TO 33.9 IN ADULT: ICD-10-CM

## 2024-02-06 DIAGNOSIS — R80.9 TYPE 2 DIABETES MELLITUS WITH MICROALBUMINURIA, WITH LONG-TERM CURRENT USE OF INSULIN: ICD-10-CM

## 2024-02-06 DIAGNOSIS — R80.9 TYPE 1 DIABETES MELLITUS WITH MICROALBUMINURIA: ICD-10-CM

## 2024-02-06 DIAGNOSIS — E11.29 TYPE 2 DIABETES MELLITUS WITH MICROALBUMINURIA, WITH LONG-TERM CURRENT USE OF INSULIN: ICD-10-CM

## 2024-02-06 DIAGNOSIS — E13.9 LADA (LATENT AUTOIMMUNE DIABETES IN ADULTS), MANAGED AS TYPE 2: ICD-10-CM

## 2024-02-06 DIAGNOSIS — Z79.4 TYPE 2 DIABETES MELLITUS WITH MICROALBUMINURIA, WITH LONG-TERM CURRENT USE OF INSULIN: ICD-10-CM

## 2024-02-06 DIAGNOSIS — E10.29 TYPE 1 DIABETES MELLITUS WITH MICROALBUMINURIA: ICD-10-CM

## 2024-02-06 DIAGNOSIS — R80.9 MICROALBUMINURIA DUE TO TYPE 2 DIABETES MELLITUS: ICD-10-CM

## 2024-02-06 DIAGNOSIS — E11.69 TYPE 2 DIABETES MELLITUS WITH OBESITY: Primary | ICD-10-CM

## 2024-02-06 DIAGNOSIS — L85.3 DRY SKIN DERMATITIS: ICD-10-CM

## 2024-02-06 PROCEDURE — 3046F HEMOGLOBIN A1C LEVEL >9.0%: CPT | Mod: CPTII,S$GLB,, | Performed by: NURSE PRACTITIONER

## 2024-02-06 PROCEDURE — 3074F SYST BP LT 130 MM HG: CPT | Mod: CPTII,S$GLB,, | Performed by: NURSE PRACTITIONER

## 2024-02-06 PROCEDURE — 1160F RVW MEDS BY RX/DR IN RCRD: CPT | Mod: CPTII,S$GLB,, | Performed by: NURSE PRACTITIONER

## 2024-02-06 PROCEDURE — 99999 PR PBB SHADOW E&M-EST. PATIENT-LVL IV: CPT | Mod: PBBFAC,,, | Performed by: NURSE PRACTITIONER

## 2024-02-06 PROCEDURE — 3079F DIAST BP 80-89 MM HG: CPT | Mod: CPTII,S$GLB,, | Performed by: NURSE PRACTITIONER

## 2024-02-06 PROCEDURE — 1159F MED LIST DOCD IN RCRD: CPT | Mod: CPTII,S$GLB,, | Performed by: NURSE PRACTITIONER

## 2024-02-06 PROCEDURE — 99214 OFFICE O/P EST MOD 30 MIN: CPT | Mod: S$GLB,,, | Performed by: NURSE PRACTITIONER

## 2024-02-06 PROCEDURE — 3008F BODY MASS INDEX DOCD: CPT | Mod: CPTII,S$GLB,, | Performed by: NURSE PRACTITIONER

## 2024-02-06 RX ORDER — TIRZEPATIDE 7.5 MG/.5ML
7.5 INJECTION, SOLUTION SUBCUTANEOUS
Qty: 4 PEN | Refills: 5 | Status: SHIPPED | OUTPATIENT
Start: 2024-02-06 | End: 2024-04-11 | Stop reason: SDUPTHER

## 2024-02-06 RX ORDER — INSULIN PMP CART,AUT,G6/7,CNTR
1 EACH SUBCUTANEOUS CONTINUOUS
Qty: 1 EACH | Refills: 1 | Status: SHIPPED | OUTPATIENT
Start: 2024-02-06

## 2024-02-06 RX ORDER — INSULIN ASPART 100 [IU]/ML
INJECTION, SOLUTION INTRAVENOUS; SUBCUTANEOUS
Qty: 30 ML | Refills: 6 | Status: SHIPPED | OUTPATIENT
Start: 2024-02-06 | End: 2024-02-06

## 2024-02-06 RX ORDER — INSULIN ASPART 100 [IU]/ML
INJECTION, SOLUTION INTRAVENOUS; SUBCUTANEOUS
Qty: 30 ML | Refills: 6 | Status: SHIPPED | OUTPATIENT
Start: 2024-02-06

## 2024-02-06 RX ORDER — AMMONIUM LACTATE 12 G/100G
CREAM TOPICAL
Qty: 140 G | Refills: 2 | Status: SHIPPED | OUTPATIENT
Start: 2024-02-06

## 2024-02-06 RX ORDER — INSULIN ASPART 100 [IU]/ML
INJECTION, SOLUTION INTRAVENOUS; SUBCUTANEOUS
Qty: 30 ML | Refills: 6
Start: 2024-02-06

## 2024-02-06 RX ORDER — INSULIN PMP CART,AUT,G6/7,CNTR
1 EACH SUBCUTANEOUS
Qty: 45 EACH | Refills: 3 | Status: SHIPPED | OUTPATIENT
Start: 2024-02-06

## 2024-02-06 RX ORDER — INSULIN GLARGINE 300 U/ML
INJECTION, SOLUTION SUBCUTANEOUS
Qty: 3 PEN | Refills: 6 | Status: SHIPPED | OUTPATIENT
Start: 2024-02-06

## 2024-02-06 NOTE — Clinical Note
Pt still needs to start omnipod 5 Working on this Lien please reach out-A1c remains too high Switching to mounjaro

## 2024-02-06 NOTE — PATIENT INSTRUCTIONS
Follow up in 3 months w/ Irielle   A1c cpeptide, enma, fasting glucose in 3 months    Novolog vials (pump), [facility]ipod 5 kit[facility]  Other meds [facility]   Stop ozempic  Start mounjaro 7.5 mg weekly   Needs DE Lien  Send ammonium lactate cream-dry skin    Settings needed for omnipod w Lien  Iob 2.5 hours (active insulin)  Target 120 mg/dl   Basal 0.95 u/hr   Max basal 1.5 u/hr  Bolus icr 1 to 6   Isf 25  Bolus max  25 units    Current:  Metformin 500 mg twice a day   Toujeo 26 units at night- if off pump   Novolog 12 units ac w/ scale, snack dose 6 units units w/ snack

## 2024-02-07 ENCOUNTER — TELEPHONE (OUTPATIENT)
Dept: INTERNAL MEDICINE | Facility: CLINIC | Age: 39
End: 2024-02-07
Payer: COMMERCIAL

## 2024-02-16 ENCOUNTER — CLINICAL SUPPORT (OUTPATIENT)
Dept: DIABETES | Facility: CLINIC | Age: 39
End: 2024-02-16
Payer: COMMERCIAL

## 2024-02-16 DIAGNOSIS — E66.9 TYPE 2 DIABETES MELLITUS WITH OBESITY: ICD-10-CM

## 2024-02-16 DIAGNOSIS — E11.69 TYPE 2 DIABETES MELLITUS WITH OBESITY: ICD-10-CM

## 2024-02-16 PROCEDURE — G0108 DIAB MANAGE TRN  PER INDIV: HCPCS | Mod: S$GLB,,,

## 2024-02-16 PROCEDURE — 99999 PR PBB SHADOW E&M-EST. PATIENT-LVL I: CPT | Mod: PBBFAC,,,

## 2024-02-16 RX ORDER — BLOOD-GLUCOSE TRANSMITTER
EACH MISCELLANEOUS
Qty: 1 EACH | Refills: 3 | Status: SHIPPED | OUTPATIENT
Start: 2024-02-16

## 2024-02-16 RX ORDER — BLOOD-GLUCOSE SENSOR
EACH MISCELLANEOUS
Qty: 3 EACH | Refills: 11 | Status: SHIPPED | OUTPATIENT
Start: 2024-02-16

## 2024-02-19 ENCOUNTER — TELEPHONE (OUTPATIENT)
Dept: DIABETES | Facility: CLINIC | Age: 39
End: 2024-02-19
Payer: COMMERCIAL

## 2024-02-20 ENCOUNTER — CLINICAL SUPPORT (OUTPATIENT)
Dept: DIABETES | Facility: CLINIC | Age: 39
End: 2024-02-20
Payer: COMMERCIAL

## 2024-02-20 ENCOUNTER — PATIENT MESSAGE (OUTPATIENT)
Dept: ADMINISTRATIVE | Facility: HOSPITAL | Age: 39
End: 2024-02-20
Payer: COMMERCIAL

## 2024-02-20 DIAGNOSIS — E11.69 TYPE 2 DIABETES MELLITUS WITH OBESITY: Primary | ICD-10-CM

## 2024-02-20 DIAGNOSIS — E66.9 TYPE 2 DIABETES MELLITUS WITH OBESITY: Primary | ICD-10-CM

## 2024-02-20 PROCEDURE — G0108 DIAB MANAGE TRN  PER INDIV: HCPCS | Mod: S$GLB,,, | Performed by: FAMILY MEDICINE

## 2024-02-20 PROCEDURE — 99999 PR PBB SHADOW E&M-EST. PATIENT-LVL I: CPT | Mod: PBBFAC,,,

## 2024-02-20 NOTE — PROGRESS NOTES
Diabetes Care Specialist Progress Note  Author: Lien Wilhelm RD  Date: 2/20/2024    Program Intake  Reason for Diabetes Program Visit:: Intervention  Type of Intervention:: Individual  Individual: Device Training  Device Training: Insulin Pump Start (Pt unable to start pump during visit 2/2 pt did not have correct CGM)  Current diabetes risk level:: high  In the last 12 months, have you:: none  Continuous Glucose Monitoring  Patient has CGM: Yes  Personal CGM type:: Freestyle Roger    Lab Results   Component Value Date    HGBA1C 10.4 (H) 01/29/2024     Clinical      There is no height or weight on file to calculate BMI.    Clinical Assessment  Current Diabetes Treatment: Oral Medication, Insulin    Medication Information  Medication adherence impacting ability to self-manage diabetes?: No    Labs  Do you have regular lab work to monitor your medications?: Yes  Type of Regular Lab Work: A1c, Cholesterol, CBC, BMP  Lab Compliance Barriers: No    Additional Social History    Support  Does anyone support you with your diabetes care?: yes  Who supports you?: self  Who takes you to your medical appointments?: self  Does the current support meet the patient's needs?: Yes  Is Support an area impacting ability to self-manage diabetes?: No    Access to Mass Media & Technology  Does the patient have access to any of the following devices or technologies?: Smart phone  Media or technology needs impacting ability to self-manage diabetes?: No    Cognitive/Behavioral Health  Alert and Oriented: Yes  Difficulty Thinking: No  Requires Prompting: No  Requires assistance for routine expression?: No  Cognitive or behavioral barriers impacting ability to self-manage diabetes?: No    Communication  Language preference: English  Hearing Problems: No  Vision Problems: No  Communication needs impacting ability to self-manage diabetes?: No    Health Literacy  Preferred Learning Method: Face to Face, Reading Materials  Health literacy needs  impacting ability to self-manage diabetes?: No    Diabetes Self-Management Skills Assessment    Diabetes Disease Process/Treatment Options  Diabetes Disease Process/Treatment Options: Skills Assessment Completed: No  Deferred due to:: Time  Area of need?: Deferred    Nutrition/Healthy Eating  Nutrition/Healthy Eating Skills Assessment Completed:: No  Deffered due to:: Time  Area of need?: Deferred    Physical Activity/Exercise  Physical Activity/Exercise Skills Assessment Completed: : No  Deffered due to:: Time  Area of need?: Deferred    Medications  Patient is able to describe current diabetes management routine.: yes  Diabetes management routine:: insulin, oral medications  Medication Skills Assessment Completed:: Yes  Assessment indicates:: Knowledge deficit, Instruction Needed  Area of need?: Yes    Home Blood Glucose Monitoring  Patient states that blood sugar is checked at home daily.: yes  Monitoring Method:: personal continuous glucose monitor  Personal CGM type:: Freestyle Roger  Home Blood Glucose Monitoring Skills Assessment Completed: : Yes  Assessment indicates:: Instruction Needed  Area of need?: Yes    Acute Complications  Acute Complications Skills Assessment Completed: : No  Deffered due to:: Time  Area of need?: Deferred    Chronic Complications  Patient is taking statin?: Yes  Chronic Complications Skills Assessment Completed: : No  Deferred due to:: Time  Area of need?: Deferred    Psychosocial/Coping  Psychosocial/Coping Skills Assessment Completed: : No  Deffered due to:: Time  Area of need?: Deferred    Assessment Summary and Plan    Based on today's diabetes care assessment, the following areas of need were identified:          2/16/2024    12:01 AM   Social   Support No   Access to Mass Media/Tech No   Cognitive/Behavioral Health No   Communication No   Health Literacy No            2/16/2024    12:01 AM   Clinical   Medication Adherence No   Lab Compliance No            2/16/2024    12:01  AM   Diabetes Self-Management Skills   Diabetes Disease Process/Treatment Options Deferred   Nutrition/Healthy Eating Deferred   Physical Activity/Exercise Deferred   Medication Yes, see care plan.   Home Blood Glucose Monitoring Yes   Acute Complications Deferred   Chronic Complications Deferred   Psychosocial/Coping Deferred      Today's interventions were provided through individual discussion, instruction, and written materials were provided.      Patient verbalized understanding of instruction and written materials.  Pt was able to return back demonstration of instructions today. Patient understood key points, needs reinforcement and further instruction.     Diabetes Self-Management Care Plan:    Today's Diabetes Self-Management Care Plan was developed with Fco's input. Fco has agreed to work toward the following goal(s) to improve his/her overall diabetes control.      Care Plan: Diabetes Management   Updates made since 1/21/2024 12:00 AM        Problem: Medications         Goal: Patient Agrees to take Diabetes Medications as prescribed.    Start Date: 2/16/2024   Expected End Date: 5/20/2024   This Visit's Progress: Deferred   Priority: High   Barriers: Knowledge deficit   Note:    Pt unable to start Omnipod 5 at this time 2/2 pt did not have correct CGM. Educator reviewed features of Omnipod 5 and Dexcom G6 with pt. Educator reviewed carbohydrate counting with pt and provided pt with food log to bring to F/U with educator to review.       Follow Up Plan     Follow up in about 4 days (around 2/20/2024) for Possible Omnipod 5 pump start.    Today's care plan and follow up schedule was discussed with patient.  Fco verbalized understanding of the care plan, goals, and agrees to follow up plan.        The patient was encouraged to communicate with his/her health care provider/physician and care team regarding his/her condition(s) and treatment.  I provided the patient with my contact information today  and encouraged to contact me via phone or [facility] Patient Portal as needed.     Length of Visit   Total Time: 60 Minutes

## 2024-02-26 VITALS — BODY MASS INDEX: 37.83 KG/M2 | WEIGHT: 241 LBS | HEIGHT: 67 IN

## 2024-02-26 NOTE — PROGRESS NOTES
"Diabetes Care Specialist Progress Note  Author: Michelle Murphy RN  Date: 2/20/2024    Program Intake  Reason for Diabetes Program Visit:: Intervention  Type of Intervention:: Individual  Individual: Device Training  Device Training: Insulin Pump Start  Current diabetes risk level:: high  In the last 12 months, have you:: none  Permission to speak with others about care:: yes  Continuous Glucose Monitoring  Patient has CGM: Yes  Personal CGM type:: Dexcom G6  GMI Date: 02/20/24    Lab Results   Component Value Date    HGBA1C 10.4 (H) 01/29/2024       Clinical  Weight: 109.3 kg (241 lb)   Height: 5' 7" (170.2 cm)   Body mass index is 37.75 kg/m².    Additional Social History  Support  Does anyone support you with your diabetes care?: yes  Who supports you?: self  Who takes you to your medical appointments?: self  Does the current support meet the patient's needs?: Yes  Is Support an area impacting ability to self-manage diabetes?: No    Access to Mass Media & Technology  Does the patient have access to any of the following devices or technologies?: Smart phone  Media or technology needs impacting ability to self-manage diabetes?: No    Cognitive/Behavioral Health  Alert and Oriented: Yes  Difficulty Thinking: No  Requires Prompting: No  Requires assistance for routine expression?: No  Cognitive or behavioral barriers impacting ability to self-manage diabetes?: No    Culture/Anabaptism  Culture or Methodist beliefs that may impact ability to access healthcare: No    Communication  Language preference: English  Hearing Problems: No  Vision Problems: No  Communication needs impacting ability to self-manage diabetes?: No    Health Literacy  Preferred Learning Method: Face to Face, Reading Materials  How often do you need to have someone help you read instructions, pamphlets, or written material from your doctor or pharmacy?: Never  Health literacy needs impacting ability to self-manage diabetes?: No      Diabetes " Self-Management Skills Assessment  Diabetes Disease Process/Treatment Options  Diabetes Disease Process/Treatment Options: Skills Assessment Completed: No  Deferred due to:: Time    Nutrition/Healthy Eating  Nutrition/Healthy Eating Skills Assessment Completed:: No  Area of need?: Deferred    Physical Activity/Exercise  Physical Activity/Exercise Skills Assessment Completed: : No  Deffered due to:: Time    Medications  Patient is able to describe current diabetes management routine.: yes  Diabetes management routine:: injectable medications, insulin, diet  Patient is able to identify current diabetes medications, dosages, and appropriate timing of medications.: yes  Patient understands the purpose of the medications taken for diabetes.: yes  Patient reports problems or concerns with current medication regimen.: no  Medication Skills Assessment Completed:: Yes  Assessment indicates:: Instruction Needed, Knowledge deficit  Area of need?: Yes    Home Blood Glucose Monitoring  Patient states that blood sugar is checked at home daily.: yes  Monitoring Method:: personal continuous glucose monitor  Personal CGM type:: Dexcom G6  Patient is able to use personal CGM appropriately.: yes  CGM Report reviewed?: no  Home Blood Glucose Monitoring Skills Assessment Completed: : Yes  Assessment indicates:: Instruction Needed  Area of need?: No    Acute Complications  Patient is able to identify types of acute complications: No  Acute Complications Skills Assessment Completed: : Yes  Assessment indicates:: Instruction Needed, Knowledge deficit  Area of need?: Yes    Chronic Complications  Chronic Complications Skills Assessment Completed: : No  Deferred due to:: Time    Psychosocial/Coping  Psychosocial/Coping Skills Assessment Completed: : No  Deffered due to:: Time      Assessment Summary and Plan    Based on today's diabetes care assessment, the following areas of need were identified:          2/20/2024    12:01 AM   Social    Support No   Access to Mass Media/Tech No   Cognitive/Behavioral Health No   Culture/Christianity No   Communication No   Health Literacy No            2/16/2024    12:01 AM   Clinical   Medication Adherence No   Lab Compliance No            2/20/2024    12:01 AM   Diabetes Self-Management Skills   Nutrition/Healthy Eating Deferred   Medication Yes- see care planning   Home Blood Glucose Monitoring No   Acute Complications Yes- Discussed blood sugar goals, prevention, detection, signs and symptoms, and treatment of hypoglycemia following rule of 15.          Today's interventions were provided through individual discussion, instruction, and written materials were provided.      Patient verbalized understanding of instruction and written materials.  Pt was able to return back demonstration of instructions today. Patient understood key points, needs reinforcement and further instruction.     Diabetes Self-Management Care Plan:    Today's Diabetes Self-Management Care Plan was developed with Fco's input. Fco has agreed to work toward the following goal(s) to improve his/her overall diabetes control.      Care Plan: Diabetes Management   Updates made since 1/27/2024 12:00 AM        Problem: Medications         Goal: Patient Agrees to take Diabetes Medications as prescribed.    Start Date: 2/16/2024   Expected End Date: 5/20/2024   This Visit's Progress: Deferred   Priority: High   Barriers: Knowledge deficit   Note:    Pt unable to start Omnipod 5 at this time 2/2 pt did not have correct CGM. Educator reviewed features of Omnipod 5 and Dexcom G6 with pt. Educator reviewed carbohydrate counting with pt and provided pt with food log to bring to F/U with educator to review.           Problem: Blood Glucose Self-Monitoring         Goal: Patient Agrees to use OmniPod5 Automated Insulin delivery system as instructed for continuous insulin therapy.    Start Date: 2/20/2024   Expected End Date: 2/27/2024   Priority: High    Barriers: No Barriers Identified   Note:    2/20/23 - - ..OMNI POD 5 INSULIN PUMP START + Carbohydrate Counting  Pump training was provided per Omni Pod protocol. First we reviewed how to count carbohydrates.       Carbohydrate Counting:   -  Reviewed what is a carbohydrate and basic carb foods. Reviewed insulin to carb ratio and how to count carbs.  -- Reviewed carb counting with patients using various examples of different meal plan.   -  Provided handouts to help with counting carbohydrates  -  Explained and did several examples using the ratios of 1:6 with pt to determine insulin amounts in different situations  - Pt demonstrated carb counting successfully today, will still need to practice and refer to reference sheet. Pt will be re-evaluated at next visit.     Basal Rate: 0.95 units/hr  Bolus ICR: 6  ISF: 25  Glucose Target: 120  Active insulin time:  2.5 hours  Max basal rate: 1.5 units/hr  Max bolus: 25 units  Low reservoir insulin alarm: 10 units  Change pod alarm: every 3 days     Details of pump therapy were covered included following controller features and programming, pod activation, pod site selection and rotation, automatic pod priming and insertion, setting & editing basal rates in manual mode, giving bolus and other features in the set-up menu.  The following regarding the Omnipod 5 was covered:  During your first Pod wear, since no recent history is available, the Omnipod 5 System uses only your active Basal Program from Manual Mode as a starting point to adjust your insulin. After 48 hours of history is collected, which usually happens with the first Pod wear, SmartTapjoyust technology stops adjusting insulin against your active Basal Program and starts using the Adaptive Basal Rate for your automated insulin delivery with your next Pod change. With each Pod change, insulin delivery information is sent and saved in the Omnipod 5 Addie so that the next Pod that is started is updated with the new  Adaptive Basal Rate. With each new Pod activation, the system adapts insulin delivery based on physiological needs and total daily insulin (TDI) delivered. After 2-3 Pod changes, adaptation generally stabilizes and automated insulin delivery is based on this adaptation.  Patient demonstrated ability to program controller, activate and insert pod using aseptic technique.  Patient demonstrated ability to program Dexcom transmitter into controller and start automated limited mode.    Instructed pt on use of basic pump features ie...give a bolus, pause insulin, switch from manual to automated mode.  Reviewed features available in manual mode verses automated mode.   Reviewed when and how to use activity function in automated mode.  Reviewed site selection of pods, rotation of sites and hard stop to change pod every 72 hrs.   Instructed that insulin vial is good out of refrigeration for up to 28-30 days.   Reviewed treatment of hypoglycemia, hyperglycemia; sick day care, DKA, and troubleshooting of pump. Advised to change site if issue is suspected.   Omni Pod 24-hour support line provided.       Podder username:  [email or web address]  Podder password:  [email or web address]    Simona username: [email or web address]  [place] password: [email or web address]!                 Problem: Blood Glucose Self-Monitoring         Goal: Patient agrees to change Dexcom G6 sensor every 10 days and transmittor every 90 days as instructed.    Start Date: 2/20/2024   Expected End Date: 2/27/2024   Priority: High   Barriers: No Barriers Identified   Note:    2/20/24 - - INSERTING DEXCOM G6 CGM  Patient has insulin pump start today and needs to insert his Dexcom G6 sensor and transmittor. Patient has Dexcom G6 mobile julius downloaded on the phone. He is inserting his Dexcom G6 per to his right abdomen per Dexcom protocol.        Follow Up Plan     Follow up in about 8 days (around 2/28/2024) for 1 wk Insulin Pump f/u.    Today's care plan and follow up schedule was discussed  with patient.  Fco verbalized understanding of the care plan, goals, and agrees to follow up plan.        The patient was encouraged to communicate with his/her health care provider/physician and care team regarding his/her condition(s) and treatment.  I provided the patient with my contact information today and encouraged to contact me via phone or [facility]'s Patient Portal as needed.     Length of Visit   Total Time: 150 Minutes

## 2024-02-28 ENCOUNTER — CLINICAL SUPPORT (OUTPATIENT)
Dept: DIABETES | Facility: CLINIC | Age: 39
End: 2024-02-28
Payer: COMMERCIAL

## 2024-02-28 DIAGNOSIS — E66.9 TYPE 2 DIABETES MELLITUS WITH OBESITY: Primary | ICD-10-CM

## 2024-02-28 DIAGNOSIS — E11.69 TYPE 2 DIABETES MELLITUS WITH OBESITY: Primary | ICD-10-CM

## 2024-02-28 DIAGNOSIS — J36 PERITONSILLAR ABSCESS: ICD-10-CM

## 2024-02-28 PROCEDURE — G0108 DIAB MANAGE TRN  PER INDIV: HCPCS | Mod: S$GLB,,, | Performed by: FAMILY MEDICINE

## 2024-02-28 PROCEDURE — 99999 PR PBB SHADOW E&M-EST. PATIENT-LVL II: CPT | Mod: PBBFAC,,,

## 2024-02-28 NOTE — Clinical Note
Genaro Marino, Mr. Zafar's [place] report from his 1 week OmniPod start is in  for your viewing. I reviewed carb counting, insulin/meal timing, and stressed the importance of blousing with meals. He's doing well. Also, the pt informed me that he was still taking his long-acting insulin by habit. We discussed again to discontinue his long-acting insulin. Pt verbalized understanding. Please let me know if there's anything you would like me to relay to the patient. Thank you for the referral.

## 2024-02-29 VITALS — WEIGHT: 249.63 LBS | HEIGHT: 67 IN | BODY MASS INDEX: 39.18 KG/M2

## 2024-03-04 ENCOUNTER — PATIENT OUTREACH (OUTPATIENT)
Dept: ADMINISTRATIVE | Facility: HOSPITAL | Age: 39
End: 2024-03-04
Payer: COMMERCIAL

## 2024-03-04 NOTE — PROGRESS NOTES
"Diabetes Care Specialist Progress Note  Author: Michelle Murphy RN  Date: 2/28/2024    Program Intake  Reason for Diabetes Program Visit:: Intervention  Type of Intervention:: Individual  Individual: Education  Education: Other (1 week Insulin Pump F/U)  Current diabetes risk level:: high  In the last 12 months, have you:: none  Permission to speak with others about care:: yes  Continuous Glucose Monitoring  Patient has CGM: Yes  Personal CGM type:: Dexcom G6  GMI Date: 02/20/24    Lab Results   Component Value Date    HGBA1C 10.4 (H) 01/29/2024       Clinical  Weight: 113.2 kg (249 lb 9.6 oz)   Height: 5' 7" (170.2 cm)   Body mass index is 39.09 kg/m².    Clinical Assessment  Current Diabetes Treatment: Insulin pump  Have you ever experienced hypoglycemia (low blood sugar)?: no  Have you ever experienced hyperglycemia (high blood sugar)?: no    Medication Information  How do you obtain your medications?: Patient drives  How many days a week do you miss your medications?: Never  Do you sometimes have difficulty refilling your medications?: No  Medication adherence impacting ability to self-manage diabetes?: No    Labs  Do you have regular lab work to monitor your medications?: Yes  Type of Regular Lab Work: A1c  Where do you get your labs drawn?: [facility]  Lab Compliance Barriers: No       Additional Social History  Support  Does anyone support you with your diabetes care?: yes  Who supports you?: self  Who takes you to your medical appointments?: self  Does the current support meet the patient's needs?: Yes  Is Support an area impacting ability to self-manage diabetes?: No    Access to Mass Media & Technology  Does the patient have access to any of the following devices or technologies?: Smart phone  Media or technology needs impacting ability to self-manage diabetes?: No    Cognitive/Behavioral Health  Alert and Oriented: Yes  Difficulty Thinking: No  Requires Prompting: No  Requires assistance for routine " expression?: No  Cognitive or behavioral barriers impacting ability to self-manage diabetes?: No    Culture/Rastafarian  Culture or Episcopal beliefs that may impact ability to access healthcare: No    Communication  Language preference: English  Hearing Problems: No  Vision Problems: No  Communication needs impacting ability to self-manage diabetes?: No    Health Literacy  Preferred Learning Method: Face to Face, Reading Materials  How often do you need to have someone help you read instructions, pamphlets, or written material from your doctor or pharmacy?: Never  Health literacy needs impacting ability to self-manage diabetes?: No      Diabetes Self-Management Skills Assessment  Diabetes Disease Process/Treatment Options  Diabetes Disease Process/Treatment Options: Skills Assessment Completed: No  Area of need?: Deferred    Nutrition/Healthy Eating  Challenges to healthy eating:: portion control  Method of carbohydrate measurement:: carb counting/reading labels  Patient can identify foods that impact blood sugar.: yes  Patient-identified foods:: fruit/fruit juice, milk, soda, starchy vegetables (corn, peas, beans), starches (bread, pasta, rice, cereal), sweets, yogurt  Nutrition/Healthy Eating Skills Assessment Completed:: Yes  Assessment indicates:: Instruction Needed  Area of need?: Yes    Physical Activity/Exercise  Physical Activity/Exercise Skills Assessment Completed: : No  Deffered due to:: Time    Medications  Medication Skills Assessment Completed:: No  Area of need?: Yes    Home Blood Glucose Monitoring  Patient states that blood sugar is checked at home daily.: yes  Monitoring Method:: personal continuous glucose monitor  Personal CGM type:: Dexcom G6  Patient is able to use personal CGM appropriately.: yes  CGM Report reviewed?: no  Home Blood Glucose Monitoring Skills Assessment Completed: : Yes  Assessment indicates:: Instruction Needed  Area of need?: No    Acute Complications  Acute Complications  Skills Assessment Completed: : No  Area of need?: No    Chronic Complications  Chronic Complications Skills Assessment Completed: : No  Area of need?: No    Psychosocial/Coping  Psychosocial/Coping Skills Assessment Completed: : No  Area of need?: No      Assessment Summary and Plan    Based on today's diabetes care assessment, the following areas of need were identified:          2/28/2024    12:01 AM   Social   Support No   Access to Mass Media/Tech No   Cognitive/Behavioral Health No   Culture/Adventism No   Communication No   Health Literacy No            2/28/2024    12:01 AM   Clinical   Medication Adherence No   Lab Compliance No            2/28/2024    12:01 AM   Diabetes Self-Management Skills   Diabetes Disease Process/Treatment Options Deferred   Nutrition/Healthy Eating Yes- Discussed eating healthy and diabetes. Reviewed carb amts and appropriate serving sizes for meals.    Medication Yes- see care planning   Home Blood Glucose Monitoring No   Acute Complications No   Chronic Complications No   Psychosocial/Coping No          Today's interventions were provided through individual discussion, instruction, and written materials were provided.      Patient verbalized understanding of instruction and written materials.  Pt was able to return back demonstration of instructions today. Patient understood key points, needs reinforcement and further instruction.     Diabetes Self-Management Care Plan:    Today's Diabetes Self-Management Care Plan was developed with Fco's input. Fco has agreed to work toward the following goal(s) to improve his/her overall diabetes control.      Care Plan: Diabetes Management   Updates made since 2/3/2024 12:00 AM        Problem: Medications         Goal: Patient Agrees to take Diabetes Medications as prescribed. Completed 2/28/2024   Start Date: 2/16/2024   Expected End Date: 5/20/2024   Recent Progress: Deferred   Priority: High   Barriers: Knowledge deficit   Note:     Pt unable to start Omnipod 5 at this time 2/2 pt did not have correct CGM. Educator reviewed features of Omnipod 5 and Dexcom G6 with pt. Educator reviewed carbohydrate counting with pt and provided pt with food log to bring to F/U with educator to review.           Problem: Blood Glucose Self-Monitoring         Goal: Patient Agrees to use OmniPod5 Automated Insulin delivery system as instructed for continuous insulin therapy.    Start Date: 2/20/2024   Expected End Date: 2/27/2024   This Visit's Progress: On track   Priority: High   Barriers: No Barriers Identified   Note:    2/20/23 - - ..OMNI POD 5 INSULIN PUMP START + Carbohydrate Counting  Pump training was provided per Omni Pod protocol. First we reviewed how to count carbohydrates.       Carbohydrate Counting:   -  Reviewed what is a carbohydrate and basic carb foods. Reviewed insulin to carb ratio and how to count carbs.  -- Reviewed carb counting with patients using various examples of different meal plan.   -  Provided handouts to help with counting carbohydrates  -  Explained and did several examples using the ratios of 1:6 with pt to determine insulin amounts in different situations  - Pt demonstrated carb counting successfully today, will still need to practice and refer to reference sheet. Pt will be re-evaluated at next visit.     Basal Rate: 0.95 units/hr  Bolus ICR: 6  ISF: 25  Glucose Target: 120  Active insulin time:  2.5 hours  Max basal rate: 1.5 units/hr  Max bolus: 25 units  Low reservoir insulin alarm: 10 units  Change pod alarm: every 3 days     Details of pump therapy were covered included following controller features and programming, pod activation, pod site selection and rotation, automatic pod priming and insertion, setting & editing basal rates in manual mode, giving bolus and other features in the set-up menu.  The following regarding the Omnipod 5 was covered:  During your first Pod wear, since no recent history is available, the  Omnipod 5 System uses only your active Basal Program from Manual Mode as a starting point to adjust your insulin. After 48 hours of history is collected, which usually happens with the first Pod wear, Smart[employer] technology stops adjusting insulin against your active Basal Program and starts using the Adaptive Basal Rate for your automated insulin delivery with your next Pod change. With each Pod change, insulin delivery information is sent and saved in the Omnipod 5 Addie so that the next Pod that is started is updated with the new Adaptive Basal Rate. With each new Pod activation, the system adapts insulin delivery based on physiological needs and total daily insulin (TDI) delivered. After 2-3 Pod changes, adaptation generally stabilizes and automated insulin delivery is based on this adaptation.  Patient demonstrated ability to program controller, activate and insert pod using aseptic technique.  Patient demonstrated ability to program Dexcom transmitter into controller and start automated limited mode.    Instructed pt on use of basic pump features ie...give a bolus, pause insulin, switch from manual to automated mode.  Reviewed features available in manual mode verses automated mode.   Reviewed when and how to use activity function in automated mode.  Reviewed site selection of pods, rotation of sites and hard stop to change pod every 72 hrs.   Instructed that insulin vial is good out of refrigeration for up to 28-30 days.   Reviewed treatment of hypoglycemia, hyperglycemia; sick day care, DKA, and troubleshooting of pump. Advised to change site if issue is suspected.   Omni Pod 24-hour support line provided.       Timbo username:  [email or web address]  Podmicah password:  [email or web address]!    Simona username: [email or web address]  Simona password: [email or web address]!       Care Plan Update 2/28/24 - - Patient is doing well; he's bolousing with meals. We reviewed carb counting and insulin/meal timing. I stressed the importance of blousing  with meals. Pt informed me that he was still taking his long-acting insulin by habit. We discussed again to discontinue his long-acting insulin. Pt verbalized understanding.               Problem: Blood Glucose Self-Monitoring         Goal: Patient agrees to change Dexcom G6 sensor every 10 days and transmittor every 90 days as instructed.    Start Date: 2/20/2024   Expected End Date: 2/27/2024   This Visit's Progress: Deferred   Priority: High   Barriers: No Barriers Identified   Note:    2/20/24 - - INSERTING DEXCOM G6 CGM  Patient has insulin pump start today and needs to insert his Dexcom G6 sensor and transmittor. Patient has Dexcom G6 mobile julius downloaded on the phone. He is inserting his Dexcom G6 per to his right abdomen per Dexcom protocol.     Deferred 2/28/24       Follow Up Plan     No follow-ups on file.    Today's care plan and follow up schedule was discussed with patient.  Fco verbalized understanding of the care plan, goals, and agrees to follow up plan.        The patient was encouraged to communicate with his/her health care provider/physician and care team regarding his/her condition(s) and treatment.  I provided the patient with my contact information today and encouraged to contact me via phone or [facility]'s Patient Portal as needed.     Length of Visit   Total Time: 60 Minutes

## 2024-04-12 RX ORDER — TIRZEPATIDE 7.5 MG/.5ML
7.5 INJECTION, SOLUTION SUBCUTANEOUS
Qty: 4 PEN | Refills: 5 | Status: SHIPPED | OUTPATIENT
Start: 2024-04-12 | End: 2024-06-16 | Stop reason: SDUPTHER

## 2024-04-16 ENCOUNTER — CLINICAL SUPPORT (OUTPATIENT)
Dept: DIABETES | Facility: CLINIC | Age: 39
End: 2024-04-16
Payer: COMMERCIAL

## 2024-04-16 VITALS — HEIGHT: 67 IN | WEIGHT: 240 LBS | BODY MASS INDEX: 37.67 KG/M2

## 2024-04-16 DIAGNOSIS — E66.9 TYPE 2 DIABETES MELLITUS WITH OBESITY: Primary | ICD-10-CM

## 2024-04-16 DIAGNOSIS — E11.69 TYPE 2 DIABETES MELLITUS WITH OBESITY: Primary | ICD-10-CM

## 2024-04-16 PROCEDURE — G0108 DIAB MANAGE TRN  PER INDIV: HCPCS | Mod: S$GLB,,, | Performed by: FAMILY MEDICINE

## 2024-04-16 PROCEDURE — 99999 PR PBB SHADOW E&M-EST. PATIENT-LVL II: CPT | Mod: PBBFAC,,,

## 2024-04-16 NOTE — Clinical Note
Genaro Tian, Mr. Zafar wanted me to mention that needs his appt set up for his Preventive Care Measures such as eye exam, A1C, and urine ketones.

## 2024-04-16 NOTE — PROGRESS NOTES
"Diabetes Care Specialist Progress Note  Author: Michelle Murphy RN  Date: 4/16/2024    Program Intake  Reason for Diabetes Program Visit:: Intervention  Type of Intervention:: Individual  Education: Pattern Management  Device Training: Other (Follow up OmniPod 5 Insulin Pump Start)  Current diabetes risk level:: moderate  In the last 12 months, have you:: none  Permission to speak with others about care:: yes  Continuous Glucose Monitoring  Patient has CGM: Yes  Personal CGM type:: Dexcom G6  GMI Date: 04/17/24  GMI Value: 7.1 %    Lab Results   Component Value Date    HGBA1C 10.4 (H) 01/29/2024       Clinical  Weight: 108.9 kg (240 lb)   Height: 5' 7" (170.2 cm)   Body mass index is 37.59 kg/m².    Clinical Assessment  Current Diabetes Treatment: Insulin pump  Have you ever experienced hypoglycemia (low blood sugar)?: no  Have you ever experienced hyperglycemia (high blood sugar)?: no    Labs  Do you have regular lab work to monitor your medications?: Yes  Type of Regular Lab Work: A1c  Where do you get your labs drawn?: [facility]  Lab Compliance Barriers: No    Nutritional Status  Current nutritional status an area of need that is impacting patient's ability to self-manage diabetes?: No    Additional Social History  Support  Does anyone support you with your diabetes care?: yes  Who supports you?: self, spouse  Who takes you to your medical appointments?: self  Does the current support meet the patient's needs?: Yes  Is Support an area impacting ability to self-manage diabetes?: No    Access to Mass Media & Technology  Does the patient have access to any of the following devices or technologies?: Smart phone  Media or technology needs impacting ability to self-manage diabetes?: No    Cognitive/Behavioral Health  Alert and Oriented: Yes  Difficulty Thinking: No  Requires Prompting: No  Requires assistance for routine expression?: No  Cognitive or behavioral barriers impacting ability to self-manage diabetes?: " No    Culture/Faith  Culture or Scientologist beliefs that may impact ability to access healthcare: No    Communication  Language preference: English  Hearing Problems: No  Vision Problems: No  Communication needs impacting ability to self-manage diabetes?: No    Health Literacy  Preferred Learning Method: Face to Face  How often do you need to have someone help you read instructions, pamphlets, or written material from your doctor or pharmacy?: Never  Health literacy needs impacting ability to self-manage diabetes?: No      Diabetes Self-Management Skills Assessment  Diabetes Disease Process/Treatment Options  Diabetes Disease Process/Treatment Options: Skills Assessment Completed: No  Area of need?: No    Nutrition/Healthy Eating  Challenges to healthy eating:: portion control  Method of carbohydrate measurement:: carb counting/reading labels  Patient can identify foods that impact blood sugar.: yes  Patient-identified foods:: fruit/fruit juice, milk, soda, starchy vegetables (corn, peas, beans), starches (bread, pasta, rice, cereal), sweets  Nutrition/Healthy Eating Skills Assessment Completed:: Yes  Assessment indicates:: Instruction Needed  Area of need?: Yes    Physical Activity/Exercise  Physical Activity/Exercise Skills Assessment Completed: : No  Area of need?: Deferred    Medications  Patient is able to describe current diabetes management routine.: yes  Diabetes management routine:: injectable medications, insulin pump, diet  Patient is able to identify current diabetes medications, dosages, and appropriate timing of medications.: yes  Patient understands the purpose of the medications taken for diabetes.: yes  Patient reports problems or concerns with current medication regimen.: no  Medication Skills Assessment Completed:: Yes  Assessment indicates:: Instruction Needed  Area of need?: Yes    Home Blood Glucose Monitoring  Patient states that blood sugar is checked at home daily.: yes  Monitoring Method::  personal continuous glucose monitor  Personal CGM type:: Dexcom G6  Patient is able to use personal CGM appropriately.: yes  CGM Report reviewed?: no  Home Blood Glucose Monitoring Skills Assessment Completed: : Yes  Assessment indicates:: Instruction Needed  Area of need?: No    Acute Complications  Patient is able to identify types of acute complications: Yes  Patient Identified:: Hypoglycemia  Patient is able to state the basic meaning of hypoglycemia?: Yes  Able to state the blood sugar range for hypoglycemia?: yes  Patient stated range:: <70  Patient can identify general symptoms of hypoglycemia: yes  Patient identified:: anxiety  Able to state proper treatment of hypoglycemia?: yes  Patient identified:: 1/2 can soda/fruit juice  Acute Complications Skills Assessment Completed: : Yes  Assessment indicates:: Adequate understanding  Area of need?: No    Chronic Complications  Chronic Complications Skills Assessment Completed: : No  Area of need?: Deferred    Psychosocial/Coping  Psychosocial/Coping Skills Assessment Completed: : No  Area of need?: Deferred      Assessment Summary and Plan    Based on today's diabetes care assessment, the following areas of need were identified:          4/16/2024    12:01 AM   Social   Support No   Access to Mass Media/Tech No   Cognitive/Behavioral Health No   Culture/Moravian No   Communication No   Health Literacy No            4/16/2024    12:01 AM   Clinical   Lab Compliance No   Nutritional Status No            4/16/2024    12:01 AM   Diabetes Self-Management Skills   Diabetes Disease Process/Treatment Options No   Nutrition/Healthy Eating Yes- see care planning   Physical Activity/Exercise Deferred   Medication Yes- see care planning   Home Blood Glucose Monitoring No   Acute Complications No   Chronic Complications Deferred   Psychosocial/Coping Deferred      Today's interventions were provided through individual discussion, instruction, and written materials were  provided.      Patient verbalized understanding of instruction and written materials.  Pt was able to return back demonstration of instructions today. Patient understood key points, needs reinforcement and further instruction.     Diabetes Self-Management Care Plan:    Today's Diabetes Self-Management Care Plan was developed with Fco's input. Fco has agreed to work toward the following goal(s) to improve his/her overall diabetes control.      Care Plan: Diabetes Management   Updates made since 3/17/2024 12:00 AM        Problem: Blood Glucose Self-Monitoring         Goal: Patient Agrees to use OmniPod5 Automated Insulin delivery system as instructed for continuous insulin therapy.    Start Date: 2/20/2024   Expected End Date: 2/27/2024   This Visit's Progress: On track   Recent Progress: On track   Priority: High   Barriers: No Barriers Identified   Note:    2/20/23 - - ..OMNI POD 5 INSULIN PUMP START + Carbohydrate Counting  Pump training was provided per Omni Pod protocol. First we reviewed how to count carbohydrates.       Carbohydrate Counting:   -  Reviewed what is a carbohydrate and basic carb foods. Reviewed insulin to carb ratio and how to count carbs.  -- Reviewed carb counting with patients using various examples of different meal plan.   -  Provided handouts to help with counting carbohydrates  -  Explained and did several examples using the ratios of 1:6 with pt to determine insulin amounts in different situations  - Pt demonstrated carb counting successfully today, will still need to practice and refer to reference sheet. Pt will be re-evaluated at next visit.     Basal Rate: 0.95 units/hr  Bolus ICR: 6  ISF: 25  Glucose Target: 120  Active insulin time:  2.5 hours  Max basal rate: 1.5 units/hr  Max bolus: 25 units  Low reservoir insulin alarm: 10 units  Change pod alarm: every 3 days     Details of pump therapy were covered included following controller features and programming, pod activation,  pod site selection and rotation, automatic pod priming and insertion, setting & editing basal rates in manual mode, giving bolus and other features in the set-up menu.  The following regarding the Omnipod 5 was covered:  During your first Pod wear, since no recent history is available, the Omnipod 5 System uses only your active Basal Program from Manual Mode as a starting point to adjust your insulin. After 48 hours of history is collected, which usually happens with the first Pod wear, [employer] technology stops adjusting insulin against your active Basal Program and starts using the Adaptive Basal Rate for your automated insulin delivery with your next Pod change. With each Pod change, insulin delivery information is sent and saved in the Omnipod 5 Addie so that the next Pod that is started is updated with the new Adaptive Basal Rate. With each new Pod activation, the system adapts insulin delivery based on physiological needs and total daily insulin (TDI) delivered. After 2-3 Pod changes, adaptation generally stabilizes and automated insulin delivery is based on this adaptation.  Patient demonstrated ability to program controller, activate and insert pod using aseptic technique.  Patient demonstrated ability to program Dexcom transmitter into controller and start automated limited mode.    Instructed pt on use of basic pump features ie...give a bolus, pause insulin, switch from manual to automated mode.  Reviewed features available in manual mode verses automated mode.   Reviewed when and how to use activity function in automated mode.  Reviewed site selection of pods, rotation of sites and hard stop to change pod every 72 hrs.   Instructed that insulin vial is good out of refrigeration for up to 28-30 days.   Reviewed treatment of hypoglycemia, hyperglycemia; sick day care, DKA, and troubleshooting of pump. Advised to change site if issue is suspected.   Omni Pod 24-hour support line provided.       Podder  username:  casey Izquierdo password:  [email or web address]!    Simona username: [email or web address]  Simona password: [email or web address]!       Care Plan Update 2/28/24 - - Patient is doing well; he's bolousing with meals. We reviewed carb counting and insulin/meal timing. I stressed the importance of blousing with meals. Pt informed me that he was still taking his long-acting insulin by habit. We discussed again to discontinue his long-acting insulin. Pt verbalized understanding.     Care Plan Update 4/16/24 - - Patient is doing well. Most of the time he bolus with meals. We discussed the importance of bolusing with every meal/snack; pt verbalized understanding.  We reviewed carb counting. Pt verbalized understanding.           Problem: Blood Glucose Self-Monitoring         Goal: Patient agrees to change Dexcom G6 sensor every 10 days and transmittor every 90 days as instructed.    Start Date: 2/20/2024   Expected End Date: 2/27/2024   Recent Progress: Deferred   Priority: High   Barriers: No Barriers Identified   Note:    2/20/24 - - INSERTING DEXCOM G6 CGM  Patient has insulin pump start today and needs to insert his Dexcom G6 sensor and transmittor. Patient has Dexcom G6 mobile julius downloaded on the phone. He is inserting his Dexcom G6 per to his right abdomen per Dexcom protocol.     Deferred 2/28/24    Care Plan Update 4/16/24 - - Reviewed Pt's dexcom BS report, pattern management, and BS elevations and lows in relation to food and/or possible causes. Discussed ways to continue improving and/or prevent hypoglycemia. Pt's time in range was 79%.         Follow Up Plan     No follow-ups on file.    Today's care plan and follow up schedule was discussed with patient.  Fco verbalized understanding of the care plan, goals, and agrees to follow up plan.        The patient was encouraged to communicate with his/her health care provider/physician and care team regarding his/her condition(s) and treatment.  I provided the patient  with my contact information today and encouraged to contact me via phone or [facility]'s Patient Portal as needed.     Length of Visit   Total Time: 60 Minutes

## 2024-04-17 ENCOUNTER — TELEPHONE (OUTPATIENT)
Dept: DIABETES | Facility: CLINIC | Age: 39
End: 2024-04-17
Payer: COMMERCIAL

## 2024-04-17 DIAGNOSIS — E11.29 TYPE 2 DIABETES MELLITUS WITH MICROALBUMINURIA, WITH LONG-TERM CURRENT USE OF INSULIN: Primary | ICD-10-CM

## 2024-04-17 DIAGNOSIS — E13.9 LADA (LATENT AUTOIMMUNE DIABETES IN ADULTS), MANAGED AS TYPE 2: ICD-10-CM

## 2024-04-17 DIAGNOSIS — H35.413 LATTICE DEGENERATION OF PERIPHERAL RETINA, BILATERAL: Primary | ICD-10-CM

## 2024-04-17 DIAGNOSIS — R80.9 TYPE 2 DIABETES MELLITUS WITH MICROALBUMINURIA, WITH LONG-TERM CURRENT USE OF INSULIN: Primary | ICD-10-CM

## 2024-04-17 DIAGNOSIS — Z79.4 TYPE 2 DIABETES MELLITUS WITH MICROALBUMINURIA, WITH LONG-TERM CURRENT USE OF INSULIN: Primary | ICD-10-CM

## 2024-04-24 DIAGNOSIS — E11.69 HYPERLIPIDEMIA ASSOCIATED WITH TYPE 2 DIABETES MELLITUS: ICD-10-CM

## 2024-04-24 DIAGNOSIS — E78.5 HYPERLIPIDEMIA ASSOCIATED WITH TYPE 2 DIABETES MELLITUS: ICD-10-CM

## 2024-04-24 NOTE — TELEPHONE ENCOUNTER
Care Due:                  Date            Visit Type   Department     Provider  --------------------------------------------------------------------------------                                HOSPITAL     [facility] INTERNAL  Last Visit: 04-      FOLLOW UP    MEDICINE       Nirav Lanza                               -                              PRIMARY      [facility] INTERNAL  Next Visit: 05-      CARE ([facility])   MEDICINE       Nirav Lanza                                                            Last  Test          Frequency    Reason                     Performed    Due Date  --------------------------------------------------------------------------------    CMP.........  12 months..  atorvastatin, lisinopriL.  03- 03-    Health [facility] Embedded Care Due Messages. Reference number: [id number].   4/24/2024 11:12:06 AM CDT

## 2024-04-25 RX ORDER — ATORVASTATIN CALCIUM 40 MG/1
TABLET, FILM COATED ORAL
Qty: 90 TABLET | Refills: 1 | Status: SHIPPED | OUTPATIENT
Start: 2024-04-25 | End: 2024-06-16 | Stop reason: SDUPTHER

## 2024-04-25 NOTE — TELEPHONE ENCOUNTER
Refill Routing Note   Refill Routing Note   Medication(s) are not appropriate for processing by [facility] for the following reason(s):        Required labs outdated    [facility] action(s):  Defer   Requires labs : Yes      Medication Therapy Plan: cmp      Appointments  past 12m or future 3m with PCP    Date Provider   Last Visit   4/3/2023 Nirav Lanza MD   Next Visit   5/13/2024 Nirav Lanza MD   ED visits in past 90 days: 0        Note composed:12:34 AM 04/25/2024

## 2024-05-06 ENCOUNTER — LAB VISIT (OUTPATIENT)
Dept: LAB | Facility: HOSPITAL | Age: 39
End: 2024-05-06
Attending: NURSE PRACTITIONER
Payer: COMMERCIAL

## 2024-05-06 DIAGNOSIS — E11.69 TYPE 2 DIABETES MELLITUS WITH OBESITY: ICD-10-CM

## 2024-05-06 DIAGNOSIS — E66.9 TYPE 2 DIABETES MELLITUS WITH OBESITY: ICD-10-CM

## 2024-05-06 LAB
ESTIMATED AVG GLUCOSE: 169 MG/DL (ref 68–131)
GLUCOSE SERPL-MCNC: 73 MG/DL (ref 70–110)
HBA1C MFR BLD: 7.5 % (ref 4–5.6)

## 2024-05-06 PROCEDURE — 84681 ASSAY OF C-PEPTIDE: CPT | Performed by: NURSE PRACTITIONER

## 2024-05-06 PROCEDURE — 82947 ASSAY GLUCOSE BLOOD QUANT: CPT | Performed by: NURSE PRACTITIONER

## 2024-05-06 PROCEDURE — 36415 COLL VENOUS BLD VENIPUNCTURE: CPT | Mod: PO | Performed by: NURSE PRACTITIONER

## 2024-05-06 PROCEDURE — 86341 ISLET CELL ANTIBODY: CPT | Performed by: NURSE PRACTITIONER

## 2024-05-06 PROCEDURE — 83036 HEMOGLOBIN GLYCOSYLATED A1C: CPT | Performed by: NURSE PRACTITIONER

## 2024-05-07 LAB — C PEPTIDE SERPL-MCNC: 0.66 NG/ML (ref 0.78–5.19)

## 2024-05-10 LAB — GAD65 AB SER-SCNC: 0.02 NMOL/L

## 2024-05-20 ENCOUNTER — PATIENT MESSAGE (OUTPATIENT)
Dept: INTERNAL MEDICINE | Facility: CLINIC | Age: 39
End: 2024-05-20
Payer: COMMERCIAL

## 2024-06-16 DIAGNOSIS — E78.5 HYPERLIPIDEMIA ASSOCIATED WITH TYPE 2 DIABETES MELLITUS: ICD-10-CM

## 2024-06-16 DIAGNOSIS — E11.69 HYPERLIPIDEMIA ASSOCIATED WITH TYPE 2 DIABETES MELLITUS: ICD-10-CM

## 2024-06-16 NOTE — TELEPHONE ENCOUNTER
No care due was identified.  Health [facility] Embedded Care Due Messages. Reference number: [id number].   6/16/2024 10:17:11 AM CDT

## 2024-06-17 RX ORDER — ATORVASTATIN CALCIUM 40 MG/1
40 TABLET, FILM COATED ORAL DAILY
Qty: 90 TABLET | Refills: 1 | Status: SHIPPED | OUTPATIENT
Start: 2024-06-17

## 2024-06-17 RX ORDER — TIRZEPATIDE 7.5 MG/.5ML
7.5 INJECTION, SOLUTION SUBCUTANEOUS
Qty: 4 PEN | Refills: 5 | Status: SHIPPED | OUTPATIENT
Start: 2024-06-17

## 2024-07-01 ENCOUNTER — OFFICE VISIT (OUTPATIENT)
Dept: INTERNAL MEDICINE | Facility: CLINIC | Age: 39
End: 2024-07-01
Payer: COMMERCIAL

## 2024-07-01 VITALS
OXYGEN SATURATION: 96 % | WEIGHT: 232.38 LBS | HEIGHT: 67 IN | BODY MASS INDEX: 36.47 KG/M2 | HEART RATE: 96 BPM | SYSTOLIC BLOOD PRESSURE: 126 MMHG | DIASTOLIC BLOOD PRESSURE: 82 MMHG

## 2024-07-01 DIAGNOSIS — R80.9 TYPE 2 DIABETES MELLITUS WITH MICROALBUMINURIA, WITH LONG-TERM CURRENT USE OF INSULIN: ICD-10-CM

## 2024-07-01 DIAGNOSIS — E11.69 HYPERLIPIDEMIA ASSOCIATED WITH TYPE 2 DIABETES MELLITUS: ICD-10-CM

## 2024-07-01 DIAGNOSIS — E11.69 TYPE 2 DIABETES MELLITUS WITH OBESITY: ICD-10-CM

## 2024-07-01 DIAGNOSIS — Z00.00 ANNUAL PHYSICAL EXAM: Primary | ICD-10-CM

## 2024-07-01 DIAGNOSIS — Z79.899 ENCOUNTER FOR LONG-TERM (CURRENT) USE OF OTHER MEDICATIONS: ICD-10-CM

## 2024-07-01 DIAGNOSIS — Z79.4 TYPE 2 DIABETES MELLITUS WITH MICROALBUMINURIA, WITH LONG-TERM CURRENT USE OF INSULIN: ICD-10-CM

## 2024-07-01 DIAGNOSIS — E78.5 HYPERLIPIDEMIA ASSOCIATED WITH TYPE 2 DIABETES MELLITUS: ICD-10-CM

## 2024-07-01 DIAGNOSIS — E11.29 TYPE 2 DIABETES MELLITUS WITH MICROALBUMINURIA, WITH LONG-TERM CURRENT USE OF INSULIN: ICD-10-CM

## 2024-07-01 DIAGNOSIS — E66.9 TYPE 2 DIABETES MELLITUS WITH OBESITY: ICD-10-CM

## 2024-07-01 PROCEDURE — 4010F ACE/ARB THERAPY RXD/TAKEN: CPT | Mod: CPTII,S$GLB,, | Performed by: FAMILY MEDICINE

## 2024-07-01 PROCEDURE — 1160F RVW MEDS BY RX/DR IN RCRD: CPT | Mod: CPTII,S$GLB,, | Performed by: FAMILY MEDICINE

## 2024-07-01 PROCEDURE — 1159F MED LIST DOCD IN RCRD: CPT | Mod: CPTII,S$GLB,, | Performed by: FAMILY MEDICINE

## 2024-07-01 PROCEDURE — 3051F HG A1C>EQUAL 7.0%<8.0%: CPT | Mod: CPTII,S$GLB,, | Performed by: FAMILY MEDICINE

## 2024-07-01 PROCEDURE — 99999 PR PBB SHADOW E&M-EST. PATIENT-LVL V: CPT | Mod: PBBFAC,,, | Performed by: FAMILY MEDICINE

## 2024-07-01 PROCEDURE — 3008F BODY MASS INDEX DOCD: CPT | Mod: CPTII,S$GLB,, | Performed by: FAMILY MEDICINE

## 2024-07-01 PROCEDURE — 3079F DIAST BP 80-89 MM HG: CPT | Mod: CPTII,S$GLB,, | Performed by: FAMILY MEDICINE

## 2024-07-01 PROCEDURE — 3074F SYST BP LT 130 MM HG: CPT | Mod: CPTII,S$GLB,, | Performed by: FAMILY MEDICINE

## 2024-07-01 PROCEDURE — 99395 PREV VISIT EST AGE 18-39: CPT | Mod: S$GLB,,, | Performed by: FAMILY MEDICINE

## 2024-07-01 RX ORDER — SILDENAFIL 100 MG/1
100 TABLET, FILM COATED ORAL DAILY PRN
Qty: 10 TABLET | Refills: 10 | Status: SHIPPED | OUTPATIENT
Start: 2024-07-01 | End: 2025-07-01

## 2024-07-01 NOTE — PROGRESS NOTES
"Subjective:       Patient ID: Fco Zafar III is a 38 y.o. male.    Chief Complaint: Follow-up    HPI    Fco Zafar III is a 38 y.o. male PMHx DM for checkup     #Cards: HLD  - reg: Lisinopril 2.5 qd; Lipitor 40 qd     #Endo: DM (A1c 5/2024 = 7.5) w/ microalb  - est w/ NP Matt,  2/2024 - upcoming 8/2024  - reg: Insulin (Tresiba 40U qhs; Novolog 15U acs), Metformin  bid; Mounjaro 7.5 qwk  - using Dexcom  - adverse effects w/ metformin IR 1000 (GI effects)  - eye exam 10/2022  - foot exam: est w/ podiatry, lv 1/2023  - urine 1/2022, microalb     #BMI 36  - losing wt since 2/2024    Review of Systems   Constitutional:  Negative for chills, fatigue and fever.   HENT:  Negative for congestion.    Respiratory:  Negative for cough and shortness of breath.    Cardiovascular:  Negative for chest pain and palpitations.   Gastrointestinal:  Negative for abdominal pain, constipation, diarrhea, nausea and vomiting.   Genitourinary:  Negative for dysuria and hematuria.   Musculoskeletal:  Negative for back pain.   Skin:  Negative for rash.   Neurological:  Negative for weakness, numbness and headaches.         Past Medical History:   Diagnosis Date    Diabetes     Diabetic ketoacidosis without coma 3/27/2023    DKA (diabetic ketoacidoses)     Hyperlipidemia     Hypertension     Sepsis 3/27/2023        Prior to Admission medications    Medication Sig Start Date End Date Taking? Authorizing Provider   ammonium lactate 12 % Crea Use 2 x a  day 2/6/24  Yes Asmita Gutierrez APRN, FNP   atorvastatin (LIPITOR) 40 MG tablet Take 1 tablet (40 mg total) by mouth once daily. 6/17/24  Yes Nirav Lanza MD   BD AMELIA 2ND GEN PEN NEEDLE 32 gauge x 5/32" Ndle USE AS DIRECTED FOUR TIMES DAILY 2/5/24  Yes Asmita Gutierrez APRN, FNP   blood-glucose sensor (DEXCOM G6 SENSOR) Mabel Change every 10 days 2/16/24  Yes Asmita Gutierrez APRN, FNP   blood-glucose transmitter (DEXCOM G6 TRANSMITTER) Mabel Change every 3 months " 2/16/24  Yes Asmita Gutierrez APRN, FNP   ciclopirox (LOPROX) 0.77 % Crea Apply topically 2 (two) times daily. 7/17/23  Yes cJ Hubbard DPM   ciclopirox (PENLAC) 8 % Soln Apply topically nightly. 7/17/23  Yes Jc Hubbard DPM   glucagon (BAQSIMI) 3 mg/actuation Spry Give one puff via nostril. Hold device between fingers and thumb, do not push plunger yet, insert tip gently into one nostril until finger(s) touch the outside of the nose, then push plunger firmly all the way in . Dose is complete when the green line disappears. 1/9/23  Yes Asmita Gutierrez APRN, FNP   insulin aspart U-100 (NOVOLOG FLEXPEN U-100 INSULIN) 100 unit/mL (3 mL) InPn pen Inject 12 units before meals plus scale 150-200+2, 201-250+4, 251-300+6, 301-350+8, >350+10. Max daily 66 units. 2/6/24  Yes Asmita Gutierrez APRN, FNP   insulin aspart U-100 (NOVOLOG U-100 INSULIN ASPART) 100 unit/mL injection Use in insulin pump, max daily 80 units. 2/6/24  Yes Asmita Gutierrez APRN, FNP   insulin glargine U-300 conc (TOUJEO MAX U-300 SOLOSTAR) 300 unit/mL (3 mL) insulin pen Inject 26 units at night 2/6/24  Yes Asmita Gutierrez APRN, ROSIE   insulin pump cart,auto,BT-cntr (OMNIPOD 5 G6 INTRO KIT, GEN 5,) Crtg 1 Device by subcutaneous (via wearable injector) route continuous. 2/6/24  Yes Asmita Gutierrez APRN, FNP   insulin pump cart,automated,BT (OMNIPOD 5 G6 PODS, GEN 5,) Crtg 1 Device by subcutaneous (via wearable injector) route every 48 hours. 2/6/24  Yes Asmita Gutierrez APRN, FNP   lisinopriL (PRINIVIL,ZESTRIL) 2.5 MG tablet TAKE 1 TABLET(2.5 MG) BY MOUTH EVERY DAY 4/13/23  Yes Nirav Lanza MD   MICRO THIN LANCETS 33 gauge Misc  4/28/20  Yes Provider, Historical   tirzepatide (MOUNJARO) 7.5 mg/0.5 mL PnIj Inject 7.5 mg into the skin every 7 days. E 11.65 6/17/24  Yes Asmita Gutierrez, APRN, FNP   metFORMIN (GLUCOPHAGE-XR) 500 MG ER 24hr tablet Take 1 tablet (500 mg total) by mouth 2 (two) times daily with meals. 1/9/23  "2/6/24  Asmita Gutierrez, APRN, FNP        Past medical history, surgical history, and family medical history reviewed and updated as appropriate.    Medications and allergies reviewed.     Objective:          Vitals:    07/01/24 1119   BP: 126/82   BP Location: Left arm   Patient Position: Sitting   BP Method: Large (Manual)   Pulse: 96   SpO2: 96%   Weight: 105.4 kg (232 lb 5.8 oz)   Height: 5' 7" (1.702 m)     Body mass index is 36.39 kg/m².  Physical Exam  Vitals and nursing note reviewed.   Constitutional:       General: He is not in acute distress.     Appearance: Normal appearance. He is well-developed.   Eyes:      Extraocular Movements: Extraocular movements intact.   Cardiovascular:      Rate and Rhythm: Normal rate and regular rhythm.      Pulses: Normal pulses.      Heart sounds: Normal heart sounds. No murmur heard.  Pulmonary:      Effort: Pulmonary effort is normal. No respiratory distress.      Breath sounds: Normal breath sounds. No wheezing.   Neurological:      Mental Status: He is alert and oriented to person, place, and time.   Psychiatric:         Mood and Affect: Mood normal.         Behavior: Behavior normal.         Lab Results   Component Value Date    WBC 9.01 03/30/2023    HGB 11.4 (L) 03/30/2023    HCT 33.8 (L) 03/30/2023     03/30/2023    CHOL 153 11/04/2023    TRIG 78 11/04/2023    HDL 35 (L) 11/04/2023    ALT 10 03/27/2023    AST 11 03/27/2023     03/30/2023    K 3.5 03/30/2023     03/30/2023    CREATININE 0.8 03/30/2023    BUN 8 03/30/2023    CO2 25 03/30/2023    TSH 0.966 01/05/2022    GLUF 73 05/06/2024    HGBA1C 7.5 (H) 05/06/2024       Assessment:       1. Annual physical exam    2. Type 2 diabetes mellitus with microalbuminuria, with long-term current use of insulin    3. Type 2 diabetes mellitus with obesity    4. Hyperlipidemia associated with type 2 diabetes mellitus    5. Encounter for long-term (current) use of other medications          Plan:   1. " Annual physical exam    2. Type 2 diabetes mellitus with microalbuminuria, with long-term current use of insulin  Overview:  Cont reg incl mounjaro  - adverse GI effects w/ metformin IR      3. Type 2 diabetes mellitus with obesity  -     Ambulatory referral/consult to Podiatry; Future; Expected date: 07/01/2024    4. Hyperlipidemia associated with type 2 diabetes mellitus  Overview:  Cont statin      5. Encounter for long-term (current) use of other medications    Other orders  -     sildenafiL (VIAGRA) 100 MG tablet; Take 1 tablet (100 mg total) by mouth daily as needed for Erectile Dysfunction.  Dispense: 10 tablet; Refill: 10        Health maintenance reviewed with patient.     Follow up in about 1 year (around 7/1/2025) for Annual.    Nirav Lanza MD  Family Medicine / Primary Care  [facility]  7/1/2024

## 2024-08-06 ENCOUNTER — PATIENT MESSAGE (OUTPATIENT)
Dept: INTERNAL MEDICINE | Facility: CLINIC | Age: 39
End: 2024-08-06

## 2024-08-15 ENCOUNTER — TELEPHONE (OUTPATIENT)
Dept: INTERNAL MEDICINE | Facility: CLINIC | Age: 39
End: 2024-08-15
Payer: COMMERCIAL

## 2024-09-30 ENCOUNTER — TELEPHONE (OUTPATIENT)
Dept: OPTOMETRY | Facility: CLINIC | Age: 39
End: 2024-09-30
Payer: COMMERCIAL

## 2024-10-01 NOTE — PROGRESS NOTES
CHIEF COMPLAINT: Type 2 Diabetes/HILDA     HPI: Mr. Fco Zafar III is a 38 y.o. male who was diagnosed with Type 2 DM in 2012,  mg/dl (initial).    Reviewed family history  +familial history of dm : mother   Has h/o ED, HTN, HLD.   +covid 19 3/21/2020, SOB/LUGO   Work w/ [employer]/, musician      Started insulin in 2012.  Stress eating.     2/1/2020 c peptide type 2 dm/HILDA diagnosis   Work hours are tough, on the go with [employer].   Watching cpeptide--- 2020. Repeat needed.    Since working w/ [place], [place], starting omnipod 5/dexcom g6.   Last visit-switched from ozempic to mounjaro.  It has drastically changed dm management.   TIR 92%  Avg 140 mg/dl  Lowest 94 mg/dl   Highest 220 mg;dl  Median 137 mg/dl   Sd 25 mg/dl  Target threshold >120 mg/dl    Stressors: wife's health/renal     Continues settings:  Mn-mn 0.95 u/hr  Insulin/carb ratio 1 to 6  Iob 2.5 hours  Isf 25   Target 120-130 mg/dl    More cooked meals, grilled   Loss 36#   Overdue for labs   Lab Results   Component Value Date    HGBA1C 7.5 (H) 05/06/2024     Pump therapy, boluses 4 x a day  Testing 4 times a day  Injections 4 x a day  Type 1 demonstrated an understanding of technology and motivated to use the device correctly. Patients are expected to adhere to a diabetes treatment plan and are capable of recognizing alerts and alarms.    PREVIOUS DIABETES MEDICATIONS TRIED  Metformin   lantus   humalog  Tresiba  Trulicity   ozempic    CURRENT DIABETIC MEDS: mounjaro 7.5 mg weekly, metformin xr 500 mg bid, novolog vials- omnipod 5, dexcom g6    Back up toujeo , novolog    Diabetes Management Status    Statin: Taking  ACE/ARB: Taking    Screening or Prevention Patient's value Goal Complete/Controlled?   HgA1C Testing and Control   Lab Results   Component Value Date    HGBA1C 7.5 (H) 05/06/2024      Annually/Less than 8% No   Lipid profile : 11/04/2023 Annually No   LDL control Lab Results   Component Value Date    LDLCALC  102.4 11/04/2023    Annually/Less than 100 mg/dl  No   Nephropathy screening Lab Results   Component Value Date    LABMICR 16.0 01/05/2022     Lab Results   Component Value Date    PROTEINUA Trace (A) 01/01/2021    Annually No   Blood pressure BP Readings from Last 1 Encounters:   07/01/24 126/82    Less than 140/90 Yes   Dilated retinal exam : 10/31/2022 Annually Yes   Foot exam   : 07/17/2023 Annually No     REVIEW OF SYSTEMS  General: no weakness, fatigue, +weight loss 36#  Eyes: no double or blurred vision, eye pain, or redness  Cardiovascular: no chest pain, palpitations, edema, or murmurs.   Respiratory: no cough or dyspnea.   GI: no heartburn, nausea, or changes in bowel patterns; good appetite.   Skin: no rashes, dryness, itching, or reactions at insulin injection sites.  Neuro: +numbness, tingling-improved, tremors, or vertigo.   Endocrine: no polyuria, polydipsia, polyphagia, heat or cold intolerance.     Vital Signs  There were no vitals taken for this visit.    Hemoglobin A1C   Date Value Ref Range Status   05/06/2024 7.5 (H) 4.0 - 5.6 % Final     Comment:     ADA Screening Guidelines:  5.7-6.4%  Consistent with prediabetes  >or=6.5%  Consistent with diabetes    High levels of fetal hemoglobin interfere with the HbA1C  assay. Heterozygous hemoglobin variants (HbS, HgC, etc)do  not significantly interfere with this assay.   However, presence of multiple variants may affect accuracy.     01/29/2024 10.4 (H) 4.0 - 5.6 % Final     Comment:     ADA Screening Guidelines:  5.7-6.4%  Consistent with prediabetes  >or=6.5%  Consistent with diabetes    High levels of fetal hemoglobin interfere with the HbA1C  assay. Heterozygous hemoglobin variants (HbS, HgC, etc)do  not significantly interfere with this assay.   However, presence of multiple variants may affect accuracy.     12/18/2023 10.3 (H) 4.0 - 5.6 % Final     Comment:     ADA Screening Guidelines:  5.7-6.4%  Consistent with prediabetes  >or=6.5%  Consistent  with diabetes    High levels of fetal hemoglobin interfere with the HbA1C  assay. Heterozygous hemoglobin variants (HbS, HgC, etc)do  not significantly interfere with this assay.   However, presence of multiple variants may affect accuracy.          Chemistry        Component Value Date/Time     03/30/2023 0303    K 3.5 03/30/2023 0303     03/30/2023 0303    CO2 25 03/30/2023 0303    BUN 8 03/30/2023 0303    CREATININE 0.8 03/30/2023 0303     (H) 03/30/2023 0303        Component Value Date/Time    CALCIUM 8.9 03/30/2023 0303    ALKPHOS 151 (H) 03/27/2023 1530    AST 11 03/27/2023 1530    ALT 10 03/27/2023 1530    BILITOT 0.4 03/27/2023 1530    ESTGFRAFRICA >60.0 01/05/2022 1131    EGFRNONAA >60.0 01/05/2022 1131           Lab Results   Component Value Date    TSH 0.966 01/05/2022      Lab Results   Component Value Date    CHOL 153 11/04/2023    CHOL 235 (H) 01/05/2022    CHOL 149 02/01/2020     Lab Results   Component Value Date    HDL 35 (L) 11/04/2023    HDL 43 01/05/2022    HDL 40 02/01/2020     Lab Results   Component Value Date    LDLCALC 102.4 11/04/2023    LDLCALC 145.0 01/05/2022    LDLCALC 91.0 02/01/2020     Lab Results   Component Value Date    TRIG 78 11/04/2023    TRIG 235 (H) 01/05/2022    TRIG 90 02/01/2020     Lab Results   Component Value Date    CHOLHDL 22.9 11/04/2023    CHOLHDL 18.3 (L) 01/05/2022    CHOLHDL 26.8 02/01/2020       PHYSICAL EXAMINATION  Constitutional: Appears well, no distress. Reviewed vitals above.  Eyes: conjunctivae & lids intact; PERRLA, EOMs intact; optic discs   Neck: Supple, trachea midline.   Respiratory: No wheezes, even and unlabored  Cardiovascular: no edema or varicosities  Lymph: deferred  Skin: warm and dry; no injection site reactions, no acanthosis nigracans observed.  Neuro:patient alert and cooperative, normal affect; steady gait.  Psychiatric: judgement & insight intact, orientation of time, place & person intact, memory; mood & affect wnl      Diabetes Foot Exam:   deferred    Assessment/Plan    1. Type 2 diabetes mellitus with microalbuminuria, with long-term current use of insulin  Hemoglobin A1C next time     Microalbumin/Creatinine Ratio, Urine 10/7    Hemoglobin A1C 10/7    F/u in 4 months  Doing amazing -predictor of 6.7-7% A1c   Cotninue regimen above  Discussed dietary habits, stressors      2. HILDA (latent autoimmune diabetes in adults), managed as type 2  On mounjaro  Tolerating well         3. Severe obesity (BMI 35.0-39.9) with comorbidity  Losing weight  Goal to be under 200#      4. Type 2 diabetes mellitus with obesity  See above, losing weight, continue exercise 3-5 x a week      5. Microalbuminuria due to type 2 diabetes mellitus  On acei  stable      6. Microcytic anemia  May skew A1c, lowering affect       7. Hypertension associated with diabetes  On acei, stable       8. Hyperlipidemia associated with type 2 diabetes mellitus  Lab Results   Component Value Date    LDLCALC 102.4 11/04/2023     On statin  Above goal  Mounjaro will help                   Answers submitted by the patient for this visit:  Review of Systems Questionnaire (Submitted on 10/2/2024)  activity change: No  unexpected weight change: Yes  neck pain: No  hearing loss: No  rhinorrhea: No  trouble swallowing: No  eye discharge: No  visual disturbance: No  chest tightness: No  wheezing: No  chest pain: No  palpitations: No  blood in stool: No  constipation: No  vomiting: No  diarrhea: No  polydipsia: No  polyuria: No  difficulty urinating: No  urgency: No  hematuria: No  joint swelling: No  arthralgias: No  headaches: No  weakness: No  confusion: No  dysphoric mood: Yes

## 2024-10-02 ENCOUNTER — OFFICE VISIT (OUTPATIENT)
Dept: INTERNAL MEDICINE | Facility: CLINIC | Age: 39
End: 2024-10-02
Payer: COMMERCIAL

## 2024-10-02 DIAGNOSIS — E11.69 HYPERLIPIDEMIA ASSOCIATED WITH TYPE 2 DIABETES MELLITUS: ICD-10-CM

## 2024-10-02 DIAGNOSIS — E78.5 HYPERLIPIDEMIA ASSOCIATED WITH TYPE 2 DIABETES MELLITUS: ICD-10-CM

## 2024-10-02 DIAGNOSIS — D50.9 MICROCYTIC ANEMIA: ICD-10-CM

## 2024-10-02 DIAGNOSIS — E13.9 LADA (LATENT AUTOIMMUNE DIABETES IN ADULTS), MANAGED AS TYPE 2: ICD-10-CM

## 2024-10-02 DIAGNOSIS — E66.01 SEVERE OBESITY (BMI 35.0-39.9) WITH COMORBIDITY: ICD-10-CM

## 2024-10-02 DIAGNOSIS — E11.29 TYPE 2 DIABETES MELLITUS WITH MICROALBUMINURIA, WITH LONG-TERM CURRENT USE OF INSULIN: Primary | ICD-10-CM

## 2024-10-02 DIAGNOSIS — I15.2 HYPERTENSION ASSOCIATED WITH DIABETES: ICD-10-CM

## 2024-10-02 DIAGNOSIS — E11.29 MICROALBUMINURIA DUE TO TYPE 2 DIABETES MELLITUS: ICD-10-CM

## 2024-10-02 DIAGNOSIS — Z79.4 TYPE 2 DIABETES MELLITUS WITH MICROALBUMINURIA, WITH LONG-TERM CURRENT USE OF INSULIN: Primary | ICD-10-CM

## 2024-10-02 DIAGNOSIS — E66.9 TYPE 2 DIABETES MELLITUS WITH OBESITY: ICD-10-CM

## 2024-10-02 DIAGNOSIS — R80.9 MICROALBUMINURIA DUE TO TYPE 2 DIABETES MELLITUS: ICD-10-CM

## 2024-10-02 DIAGNOSIS — E11.69 TYPE 2 DIABETES MELLITUS WITH OBESITY: ICD-10-CM

## 2024-10-02 DIAGNOSIS — E11.59 HYPERTENSION ASSOCIATED WITH DIABETES: ICD-10-CM

## 2024-10-02 DIAGNOSIS — R80.9 TYPE 2 DIABETES MELLITUS WITH MICROALBUMINURIA, WITH LONG-TERM CURRENT USE OF INSULIN: Primary | ICD-10-CM

## 2024-10-02 RX ORDER — TIRZEPATIDE 7.5 MG/.5ML
7.5 INJECTION, SOLUTION SUBCUTANEOUS
Qty: 4 PEN | Refills: 5 | Status: SHIPPED | OUTPATIENT
Start: 2024-10-02

## 2024-10-07 ENCOUNTER — PATIENT MESSAGE (OUTPATIENT)
Dept: INTERNAL MEDICINE | Facility: CLINIC | Age: 39
End: 2024-10-07
Payer: COMMERCIAL

## 2024-10-07 ENCOUNTER — LAB VISIT (OUTPATIENT)
Dept: LAB | Facility: HOSPITAL | Age: 39
End: 2024-10-07
Payer: COMMERCIAL

## 2024-10-07 DIAGNOSIS — E11.29 TYPE 2 DIABETES MELLITUS WITH MICROALBUMINURIA, WITH LONG-TERM CURRENT USE OF INSULIN: ICD-10-CM

## 2024-10-07 DIAGNOSIS — R80.9 TYPE 2 DIABETES MELLITUS WITH MICROALBUMINURIA, WITH LONG-TERM CURRENT USE OF INSULIN: ICD-10-CM

## 2024-10-07 DIAGNOSIS — Z79.4 TYPE 2 DIABETES MELLITUS WITH MICROALBUMINURIA, WITH LONG-TERM CURRENT USE OF INSULIN: ICD-10-CM

## 2024-10-07 LAB
ESTIMATED AVG GLUCOSE: 151 MG/DL (ref 68–131)
HBA1C MFR BLD: 6.9 % (ref 4–5.6)

## 2024-10-07 PROCEDURE — 36415 COLL VENOUS BLD VENIPUNCTURE: CPT | Performed by: NURSE PRACTITIONER

## 2024-10-07 PROCEDURE — 83036 HEMOGLOBIN GLYCOSYLATED A1C: CPT | Performed by: NURSE PRACTITIONER

## 2024-11-25 ENCOUNTER — PATIENT MESSAGE (OUTPATIENT)
Dept: INTERNAL MEDICINE | Facility: CLINIC | Age: 39
End: 2024-11-25
Payer: COMMERCIAL

## 2024-11-25 ENCOUNTER — PATIENT MESSAGE (OUTPATIENT)
Dept: ADMINISTRATIVE | Facility: HOSPITAL | Age: 39
End: 2024-11-25
Payer: COMMERCIAL

## 2024-11-25 DIAGNOSIS — I10 ESSENTIAL HYPERTENSION: ICD-10-CM

## 2024-11-25 RX ORDER — INSULIN PMP CART,AUT,G6/7,CNTR
EACH SUBCUTANEOUS
Qty: 45 EACH | Refills: 3 | Status: SHIPPED | OUTPATIENT
Start: 2024-11-25

## 2024-11-25 RX ORDER — LISINOPRIL 2.5 MG/1
2.5 TABLET ORAL DAILY
Qty: 90 TABLET | Refills: 3 | Status: SHIPPED | OUTPATIENT
Start: 2024-11-25

## 2024-11-25 NOTE — TELEPHONE ENCOUNTER
Care Due:                  Date            Visit Type   Department     Provider  --------------------------------------------------------------------------------                                EP -                              PRIMARY      [facility] INTERNAL  Last Visit: 07-      CARE ([facility])   MEDICINE       Nirav Lanza  Next Visit: None Scheduled  None         None Found                                                            Last  Test          Frequency    Reason                     Performed    Due Date  --------------------------------------------------------------------------------    CMP.........  12 months..  atorvastatin, lisinopriL.  03- 03-    Lipid Panel.  12 months..  atorvastatin.............  11-   10-    [facility] Embedded Care Due Messages. Reference number: [id number].   11/25/2024 10:01:57 AM CST

## 2024-12-02 RX ORDER — TIRZEPATIDE 7.5 MG/.5ML
7.5 INJECTION, SOLUTION SUBCUTANEOUS
Qty: 4 PEN | Refills: 5 | Status: SHIPPED | OUTPATIENT
Start: 2024-12-02

## 2024-12-26 RX ORDER — TIRZEPATIDE 7.5 MG/.5ML
7.5 INJECTION, SOLUTION SUBCUTANEOUS
Qty: 4 PEN | Refills: 5 | Status: SHIPPED | OUTPATIENT
Start: 2024-12-26

## 2025-01-03 ENCOUNTER — PATIENT OUTREACH (OUTPATIENT)
Dept: INTERNAL MEDICINE | Facility: CLINIC | Age: 40
End: 2025-01-03
Payer: COMMERCIAL

## 2025-01-03 NOTE — PROGRESS NOTES
Chart reviewed and updated. Reconciled immunizations.  Sent portal msg for aeye.      Betsey Yarbrough [facility]  Panel Care Coordinator  [facility]  [email or web address]

## 2025-01-27 ENCOUNTER — E-VISIT (OUTPATIENT)
Dept: INTERNAL MEDICINE | Facility: CLINIC | Age: 40
End: 2025-01-27
Payer: COMMERCIAL

## 2025-01-27 ENCOUNTER — PATIENT OUTREACH (OUTPATIENT)
Dept: INTERNAL MEDICINE | Facility: CLINIC | Age: 40
End: 2025-01-27

## 2025-01-27 DIAGNOSIS — E78.5 HYPERLIPIDEMIA ASSOCIATED WITH TYPE 2 DIABETES MELLITUS: ICD-10-CM

## 2025-01-27 DIAGNOSIS — Z96.41 INSULIN PUMP IN PLACE: ICD-10-CM

## 2025-01-27 DIAGNOSIS — Z01.00 DIABETIC EYE EXAM: Primary | ICD-10-CM

## 2025-01-27 DIAGNOSIS — E11.9 DIABETIC EYE EXAM: Primary | ICD-10-CM

## 2025-01-27 DIAGNOSIS — E11.69 HYPERLIPIDEMIA ASSOCIATED WITH TYPE 2 DIABETES MELLITUS: ICD-10-CM

## 2025-01-27 DIAGNOSIS — E13.9 LADA (LATENT AUTOIMMUNE DIABETES IN ADULTS), MANAGED AS TYPE 2: Primary | ICD-10-CM

## 2025-01-27 RX ORDER — INSULIN PMP CART,AUT,G6/7,CNTR
EACH SUBCUTANEOUS
Qty: 45 EACH | Refills: 3 | Status: SHIPPED | OUTPATIENT
Start: 2025-01-27 | End: 2025-01-27 | Stop reason: SDUPTHER

## 2025-01-27 RX ORDER — INSULIN PMP CART,AUT,G6/7,CNTR
EACH SUBCUTANEOUS
Qty: 45 EACH | Refills: 3 | Status: SHIPPED | OUTPATIENT
Start: 2025-01-27

## 2025-01-27 NOTE — PROGRESS NOTES
Patient ID: Fco Zafar III is a 39 y.o. male.    Chief Complaint: General Illness (Entered automatically based on patient selection in [employer].)    The patient initiated a request through [employer] on 1/27/2025 for evaluation and management with a chief complaint of General Illness (Entered automatically based on patient selection in [employer].)     I evaluated the questionnaire submission on 0127/25.    [facility] E[facility]-Medication    1/27/2025  8:24 AM CST - Filed by Patient   What do you need help with? Medication Request   Do you agree to participate in an E-Visit? Yes   If you have any of the following symptoms, please present to your local emergency room or call 911:  I acknowledge   Medication requests for narcotics will not be addressed via an E-Visit.  Please schedule an appointment. I acknowledge   Do you want to address a new or existing medication? I would like to address a medication I currently take   What is the main issue you would like addressed today? Cant get refill   Would you like to change or continue your medication? Continue medication   What medication would you like to continue?  Omnipod   Are you taking it as prescribed? Yes    What medical condition is the  medication intended to treat? Diabetes   Is the medication helping your condition? Yes   Are you having any side effects from the medication? No   Provide any additional information you feel is important.    Please attach any relevant images or files    Are you able to take your vital signs? Yes   Systolic Blood Pressure:    Diastolic Blood Pressure:    Weight:    Height:    Pulse:    Temperature:    Respiration rate:    Pulse Oxygen:          Encounter Diagnoses   Name Primary?    HILDA (latent autoimmune diabetes in adults), managed as type 2 Yes    Insulin pump in place         No orders of the defined types were placed in this encounter.           Keep 2/5/25 appt       E-Visit Time Tracking:    Day 1 Time (in minutes):  5    Total Time (in minutes): 5

## 2025-01-27 NOTE — PROGRESS NOTES
Chart reviewed and updated. Reconciled immunizations, health maintenance and care everywhere.  Placed updated diabetic eye cam, microalbumin, linked to scheduled lab appt. Sent portal msg to schedule eye cam.      Betsey Yarbrough CMA  Panel Care Coordinator  [facility]  [phone]  For Nirav Lanza MD

## 2025-01-28 ENCOUNTER — CLINICAL SUPPORT (OUTPATIENT)
Dept: FAMILY MEDICINE | Facility: CLINIC | Age: 40
End: 2025-01-28
Attending: FAMILY MEDICINE
Payer: COMMERCIAL

## 2025-01-28 DIAGNOSIS — E11.9 DIABETIC EYE EXAM: ICD-10-CM

## 2025-01-28 DIAGNOSIS — Z01.00 DIABETIC EYE EXAM: ICD-10-CM

## 2025-01-28 LAB
HM EYE EXAM: NEGATIVE
LEFT EYE DM RETINOPATHY: NEGATIVE
RIGHT EYE DM RETINOPATHY: NEGATIVE

## 2025-01-28 PROCEDURE — 92229 IMG RTA DETC/MNTR DS POC ALY: CPT | Mod: S$GLB,,, | Performed by: FAMILY MEDICINE

## 2025-01-29 ENCOUNTER — TELEPHONE (OUTPATIENT)
Dept: INTERNAL MEDICINE | Facility: CLINIC | Age: 40
End: 2025-01-29
Payer: COMMERCIAL

## 2025-01-29 DIAGNOSIS — E11.29 MICROALBUMINURIA DUE TO TYPE 2 DIABETES MELLITUS: ICD-10-CM

## 2025-01-29 DIAGNOSIS — R80.9 MICROALBUMINURIA DUE TO TYPE 2 DIABETES MELLITUS: ICD-10-CM

## 2025-01-29 NOTE — TELEPHONE ENCOUNTER
----- Message from Hai Dominguez sent at 1/28/2025  4:58 PM CST -----  Contact: Georgina Tejada [phone]    ----- Message -----  From: Haley Diaz  Sent: 1/28/2025  12:02 PM CST  To: Matt Tian Staff    Pharmacy is calling to clarify an RX.    RX name:   insulin pump cart,auto,BT,G6/7 (OMNIPOD 5 G6-G7 PODS, GEN 5,) Crtg     What do they need to clarify:  need to be change every 72 hours and needs a call back about this     Comments:     [id number]  [address] [place] [address] [place] [address]  Phone: [phone] Fax: [phone]

## 2025-02-04 RX ORDER — TIRZEPATIDE 7.5 MG/.5ML
7.5 INJECTION, SOLUTION SUBCUTANEOUS
Qty: 4 PEN | Refills: 5 | Status: SHIPPED | OUTPATIENT
Start: 2025-02-04 | End: 2025-02-05

## 2025-02-05 ENCOUNTER — OFFICE VISIT (OUTPATIENT)
Dept: INTERNAL MEDICINE | Facility: CLINIC | Age: 40
End: 2025-02-05
Payer: COMMERCIAL

## 2025-02-05 ENCOUNTER — TELEPHONE (OUTPATIENT)
Dept: INTERNAL MEDICINE | Facility: CLINIC | Age: 40
End: 2025-02-05
Payer: COMMERCIAL

## 2025-02-05 DIAGNOSIS — E11.69 TYPE 2 DIABETES MELLITUS WITH OBESITY: ICD-10-CM

## 2025-02-05 DIAGNOSIS — Z96.41 INSULIN PUMP IN PLACE: ICD-10-CM

## 2025-02-05 DIAGNOSIS — R80.9 TYPE 2 DIABETES MELLITUS WITH MICROALBUMINURIA, WITH LONG-TERM CURRENT USE OF INSULIN: Primary | ICD-10-CM

## 2025-02-05 DIAGNOSIS — E13.9 LADA (LATENT AUTOIMMUNE DIABETES IN ADULTS), MANAGED AS TYPE 2: ICD-10-CM

## 2025-02-05 DIAGNOSIS — I15.2 HYPERTENSION ASSOCIATED WITH DIABETES: ICD-10-CM

## 2025-02-05 DIAGNOSIS — Z79.4 TYPE 2 DIABETES MELLITUS WITH MICROALBUMINURIA, WITH LONG-TERM CURRENT USE OF INSULIN: Primary | ICD-10-CM

## 2025-02-05 DIAGNOSIS — E11.29 MICROALBUMINURIA DUE TO TYPE 2 DIABETES MELLITUS: ICD-10-CM

## 2025-02-05 DIAGNOSIS — R80.9 MICROALBUMINURIA DUE TO TYPE 2 DIABETES MELLITUS: ICD-10-CM

## 2025-02-05 DIAGNOSIS — E78.5 HYPERLIPIDEMIA ASSOCIATED WITH TYPE 2 DIABETES MELLITUS: ICD-10-CM

## 2025-02-05 DIAGNOSIS — E11.59 HYPERTENSION ASSOCIATED WITH DIABETES: ICD-10-CM

## 2025-02-05 DIAGNOSIS — E11.29 TYPE 2 DIABETES MELLITUS WITH MICROALBUMINURIA, WITH LONG-TERM CURRENT USE OF INSULIN: Primary | ICD-10-CM

## 2025-02-05 DIAGNOSIS — B35.3 TINEA PEDIS OF BOTH FEET: ICD-10-CM

## 2025-02-05 DIAGNOSIS — E66.9 TYPE 2 DIABETES MELLITUS WITH OBESITY: ICD-10-CM

## 2025-02-05 DIAGNOSIS — E11.69 HYPERLIPIDEMIA ASSOCIATED WITH TYPE 2 DIABETES MELLITUS: ICD-10-CM

## 2025-02-05 DIAGNOSIS — E66.01 SEVERE OBESITY (BMI 35.0-39.9) WITH COMORBIDITY: ICD-10-CM

## 2025-02-05 PROCEDURE — 2023F DILAT RTA XM W/O RTNOPTHY: CPT | Mod: CPTII,95,, | Performed by: NURSE PRACTITIONER

## 2025-02-05 PROCEDURE — 98005 SYNCH AUDIO-VIDEO EST LOW 20: CPT | Mod: 95,,, | Performed by: NURSE PRACTITIONER

## 2025-02-05 PROCEDURE — 1159F MED LIST DOCD IN RCRD: CPT | Mod: CPTII,95,, | Performed by: NURSE PRACTITIONER

## 2025-02-05 RX ORDER — TIRZEPATIDE 10 MG/.5ML
10 INJECTION, SOLUTION SUBCUTANEOUS
Qty: 4 PEN | Refills: 5 | Status: SHIPPED | OUTPATIENT
Start: 2025-02-05 | End: 2025-02-07 | Stop reason: SDUPTHER

## 2025-02-05 RX ORDER — CICLOPIROX OLAMINE 7.7 MG/G
CREAM TOPICAL 2 TIMES DAILY
Qty: 90 G | Refills: 2 | Status: SHIPPED | OUTPATIENT
Start: 2025-02-05

## 2025-02-05 NOTE — Clinical Note
Add A1c , urine mac to 2/6 labs  A1c next time  F/u in 4 months Doing well overall  Increasing mounjaro

## 2025-02-06 DIAGNOSIS — E10.29 TYPE 1 DIABETES MELLITUS WITH MICROALBUMINURIA: ICD-10-CM

## 2025-02-06 DIAGNOSIS — R80.9 TYPE 1 DIABETES MELLITUS WITH MICROALBUMINURIA: ICD-10-CM

## 2025-02-06 RX ORDER — INSULIN ASPART 100 [IU]/ML
INJECTION, SOLUTION INTRAVENOUS; SUBCUTANEOUS
Qty: 30 ML | Refills: 6 | Status: SHIPPED | OUTPATIENT
Start: 2025-02-06

## 2025-02-06 NOTE — PROGRESS NOTES
CHIEF COMPLAINT: Type 2 Diabetes/CROW     HPI: Mr. Fco Zafar III is a 39 y.o. male who was diagnosed with Type 2 DM in 2012,  mg/dl (initial).    Reviewed family history  +familial history of dm : mother   Has h/o ED, HTN, HLD.   +covid 19 3/21/2020, SOB/LUGO   Work w/ [employer]/, musician    , stressors: wife is starting HD this week.  Started insulin in 2012.  Will like higher dose mounjaro, gain 5-8# in the past 4 months.  Goal weight <200#, last 223#, lowest in past 4-6 months 215#    2/1/2020 c peptide type 2 dm/CROW diagnosis   Work hours are tough, on the go with [employer].   Watching cpeptide--- 2020. Repeat needed.    Since working w/ IRINEO [place], [place], started omnipod 5/dexcom g6.   In the past year , switched from ozempic to mounjaro.   Tolerating well.   Median 149 mg/dl   Highest 288 mg/dls  Avg 155 mg/dl  Lowest lo  Median 149 mg/dl   Tdd 44.1 units/day  Bolus # 3.8  Carb 114.3 grams/day  TIR 74%   Auto 100%   SD 37 mg/dl  Cv 24.1%    The patient location is: home   The chief complaint leading to consultation is: type 2 dm /crow type 2     Visit type: audiovisual    Face to Face time with patient: 20   minutes of total time spent on the encounter, which includes face to face time and non-face to face time preparing to see the patient (eg, review of tests), Obtaining and/or reviewing separately obtained history, Documenting clinical information in the electronic or other health record, Independently interpreting results (not separately reported) and communicating results to the patient/family/caregiver, or Care coordination (not separately reported).         Each patient to whom he or she provides medical services by telemedicine is:  (1) informed of the relationship between the physician and patient and the respective role of any other health care provider with respect to management of the patient; and (2) notified that he or she may decline to receive medical services by  telemedicine and may withdraw from such care at any time.    Notes:      Continues settings:  See media  Overdue for labs   Lab Results   Component Value Date    HGBA1C 6.9 (H) 10/07/2024     Pump therapy, boluses 4 x a day  Testing 4 times a day  Injections 4 x a day  Type 1 demonstrated an understanding of technology and motivated to use the device correctly. Patients are expected to adhere to a diabetes treatment plan and are capable of recognizing alerts and alarms.    PREVIOUS DIABETES MEDICATIONS TRIED  Metformin   lantus   humalog  Tresiba  Trulicity   ozempic    CURRENT DIABETIC MEDS: mounjaro 7.5 mg weekly, metformin xr 500 mg bid, novolog vials- omnipod 5, dexcom g6    Back up toujeo , novolog    Diabetes Management Status    Statin: Taking  ACE/ARB: Taking    Screening or Prevention Patient's value Goal Complete/Controlled?   HgA1C Testing and Control   Lab Results   Component Value Date    HGBA1C 6.9 (H) 10/07/2024      Annually/Less than 8% No   Lipid profile : 11/04/2023 Annually No   LDL control Lab Results   Component Value Date    LDLCALC 102.4 11/04/2023    Annually/Less than 100 mg/dl  No   Nephropathy screening Lab Results   Component Value Date    LABMICR 16.0 01/05/2022     Lab Results   Component Value Date    PROTEINUA Trace (A) 01/01/2021    Annually No   Blood pressure BP Readings from Last 1 Encounters:   07/01/24 126/82    Less than 140/90 Yes   Dilated retinal exam : 01/28/2025 Annually Yes   Foot exam   : 07/17/2023 Annually No     REVIEW OF SYSTEMS  General: no weakness, fatigue, +weight gain 5-8#  Eyes: no double or blurred vision, eye pain, or redness  Cardiovascular: no chest pain, palpitations, edema, or murmurs.   Respiratory: no cough or dyspnea.   GI: no heartburn, nausea, or changes in bowel patterns; good appetite.   Skin: no rashes, dryness, itching, or reactions at insulin injection sites.  Neuro: +numbness, tingling-improved, tremors, or vertigo.   Endocrine: no polyuria,  polydipsia, polyphagia, heat or cold intolerance.     Vital Signs  There were no vitals taken for this visit.    Hemoglobin A1C   Date Value Ref Range Status   10/07/2024 6.9 (H) 4.0 - 5.6 % Final     Comment:     ADA Screening Guidelines:  5.7-6.4%  Consistent with prediabetes  >or=6.5%  Consistent with diabetes    High levels of fetal hemoglobin interfere with the HbA1C  assay. Heterozygous hemoglobin variants (HbS, HgC, etc)do  not significantly interfere with this assay.   However, presence of multiple variants may affect accuracy.     05/06/2024 7.5 (H) 4.0 - 5.6 % Final     Comment:     ADA Screening Guidelines:  5.7-6.4%  Consistent with prediabetes  >or=6.5%  Consistent with diabetes    High levels of fetal hemoglobin interfere with the HbA1C  assay. Heterozygous hemoglobin variants (HbS, HgC, etc)do  not significantly interfere with this assay.   However, presence of multiple variants may affect accuracy.     01/29/2024 10.4 (H) 4.0 - 5.6 % Final     Comment:     ADA Screening Guidelines:  5.7-6.4%  Consistent with prediabetes  >or=6.5%  Consistent with diabetes    High levels of fetal hemoglobin interfere with the HbA1C  assay. Heterozygous hemoglobin variants (HbS, HgC, etc)do  not significantly interfere with this assay.   However, presence of multiple variants may affect accuracy.          Chemistry        Component Value Date/Time     03/30/2023 0303    K 3.5 03/30/2023 0303     03/30/2023 0303    CO2 25 03/30/2023 0303    BUN 8 03/30/2023 0303    CREATININE 0.8 03/30/2023 0303     (H) 03/30/2023 0303        Component Value Date/Time    CALCIUM 8.9 03/30/2023 0303    ALKPHOS 151 (H) 03/27/2023 1530    AST 11 03/27/2023 1530    ALT 10 03/27/2023 1530    BILITOT 0.4 03/27/2023 1530    ESTGFRAFRICA >60.0 01/05/2022 1131    EGFRNONAA >60.0 01/05/2022 1131           Lab Results   Component Value Date    TSH 0.966 01/05/2022      Lab Results   Component Value Date    CHOL 153 11/04/2023     CHOL 235 (H) 01/05/2022    CHOL 149 02/01/2020     Lab Results   Component Value Date    HDL 35 (L) 11/04/2023    HDL 43 01/05/2022    HDL 40 02/01/2020     Lab Results   Component Value Date    LDLCALC 102.4 11/04/2023    LDLCALC 145.0 01/05/2022    LDLCALC 91.0 02/01/2020     Lab Results   Component Value Date    TRIG 78 11/04/2023    TRIG 235 (H) 01/05/2022    TRIG 90 02/01/2020     Lab Results   Component Value Date    CHOLHDL 22.9 11/04/2023    CHOLHDL 18.3 (L) 01/05/2022    CHOLHDL 26.8 02/01/2020       PHYSICAL EXAMINATION  Constitutional: Appears well, no distress.     Assessment/Plan    1. Type 2 diabetes mellitus with microalbuminuria, with long-term current use of insulin  Hemoglobin A1C in 4 months  Increase mounjaro to 10 mg weekly  Continue settings at this time  Has refills   Doing well overall   Labs this week  A1c next time       2. Type 2 diabetes mellitus with obesity  Mounjaro increase will help      3. HILDA (latent autoimmune diabetes in adults), managed as type 2  See above      4. Severe obesity (BMI 35.0-39.9) with comorbidity  Hemoglobin A1C  Has loss weight  On glp1a/gip       5. Hyperlipidemia associated with type 2 diabetes mellitus  Lab Results   Component Value Date    LDLCALC 102.4 11/04/2023     Above goal   Add lipid        6. Hypertension associated with diabetes  Microalbumin/Creatinine Ratio, Urine  Managed per pcp  On arb/acei      7. Microalbuminuria due to type 2 diabetes mellitus  Continue to monitor  Better bmi/A1c will help      8. Insulin pump in place  On omnipod 5      9. Tinea pedis of both feet  ciclopirox (LOPROX) 0.77 % Crea rx sent

## 2025-02-07 RX ORDER — INSULIN ASPART 100 [IU]/ML
INJECTION, SOLUTION INTRAVENOUS; SUBCUTANEOUS
Qty: 30 ML | Refills: 6 | Status: SHIPPED | OUTPATIENT
Start: 2025-02-07

## 2025-02-08 RX ORDER — TIRZEPATIDE 10 MG/.5ML
10 INJECTION, SOLUTION SUBCUTANEOUS
Qty: 4 PEN | Refills: 5 | Status: SHIPPED | OUTPATIENT
Start: 2025-02-08 | End: 2025-02-10 | Stop reason: SDUPTHER

## 2025-02-10 RX ORDER — TIRZEPATIDE 10 MG/.5ML
10 INJECTION, SOLUTION SUBCUTANEOUS
Qty: 4 PEN | Refills: 5 | Status: SHIPPED | OUTPATIENT
Start: 2025-02-10 | End: 2025-02-12 | Stop reason: SDUPTHER

## 2025-02-12 RX ORDER — TIRZEPATIDE 10 MG/.5ML
10 INJECTION, SOLUTION SUBCUTANEOUS
Qty: 4 PEN | Refills: 5 | Status: SHIPPED | OUTPATIENT
Start: 2025-02-12

## 2025-02-16 RX ORDER — BLOOD-GLUCOSE SENSOR
EACH MISCELLANEOUS
Qty: 3 EACH | Refills: 11 | Status: SHIPPED | OUTPATIENT
Start: 2025-02-16

## 2025-02-20 ENCOUNTER — PATIENT MESSAGE (OUTPATIENT)
Dept: INTERNAL MEDICINE | Facility: CLINIC | Age: 40
End: 2025-02-20
Payer: COMMERCIAL

## 2025-02-20 ENCOUNTER — LAB VISIT (OUTPATIENT)
Dept: LAB | Facility: HOSPITAL | Age: 40
End: 2025-02-20
Attending: NURSE PRACTITIONER
Payer: COMMERCIAL

## 2025-02-20 DIAGNOSIS — E11.69 HYPERLIPIDEMIA ASSOCIATED WITH TYPE 2 DIABETES MELLITUS: ICD-10-CM

## 2025-02-20 DIAGNOSIS — Z79.4 TYPE 2 DIABETES MELLITUS WITH MICROALBUMINURIA, WITH LONG-TERM CURRENT USE OF INSULIN: ICD-10-CM

## 2025-02-20 DIAGNOSIS — E11.29 TYPE 2 DIABETES MELLITUS WITH MICROALBUMINURIA, WITH LONG-TERM CURRENT USE OF INSULIN: ICD-10-CM

## 2025-02-20 DIAGNOSIS — R80.9 TYPE 2 DIABETES MELLITUS WITH MICROALBUMINURIA, WITH LONG-TERM CURRENT USE OF INSULIN: ICD-10-CM

## 2025-02-20 DIAGNOSIS — E78.5 HYPERLIPIDEMIA ASSOCIATED WITH TYPE 2 DIABETES MELLITUS: ICD-10-CM

## 2025-02-20 LAB
CHOLEST SERPL-MCNC: 147 MG/DL (ref 120–199)
CHOLEST/HDLC SERPL: 3.3 {RATIO} (ref 2–5)
ESTIMATED AVG GLUCOSE: 160 MG/DL (ref 68–131)
HBA1C MFR BLD: 7.2 % (ref 4–5.6)
HDLC SERPL-MCNC: 44 MG/DL (ref 40–75)
HDLC SERPL: 29.9 % (ref 20–50)
LDLC SERPL CALC-MCNC: 94.4 MG/DL (ref 63–159)
NONHDLC SERPL-MCNC: 103 MG/DL
TRIGL SERPL-MCNC: 43 MG/DL (ref 30–150)

## 2025-02-20 PROCEDURE — 36415 COLL VENOUS BLD VENIPUNCTURE: CPT | Mod: PO | Performed by: NURSE PRACTITIONER

## 2025-02-20 PROCEDURE — 80061 LIPID PANEL: CPT | Performed by: FAMILY MEDICINE

## 2025-02-20 PROCEDURE — 83036 HEMOGLOBIN GLYCOSYLATED A1C: CPT | Performed by: NURSE PRACTITIONER

## 2025-02-21 RX ORDER — TIRZEPATIDE 10 MG/.5ML
10 INJECTION, SOLUTION SUBCUTANEOUS
Qty: 4 PEN | Refills: 5 | Status: SHIPPED | OUTPATIENT
Start: 2025-02-21

## 2025-02-24 ENCOUNTER — TELEPHONE (OUTPATIENT)
Dept: INTERNAL MEDICINE | Facility: CLINIC | Age: 40
End: 2025-02-24
Payer: COMMERCIAL

## 2025-02-24 NOTE — TELEPHONE ENCOUNTER
PA for Mounjaro was done in [facility] and notes were uploaded in [facility] TO FAX W/ HARD COPY.  Sending to [employer] today.

## 2025-02-24 NOTE — TELEPHONE ENCOUNTER
Called pt to see if he was still coming in today for his appt. He forgot all about it. IS currently moving things out of his mothers apartment, while she is in the hospital at [facility]

## 2025-03-02 RX ORDER — TIRZEPATIDE 10 MG/.5ML
10 INJECTION, SOLUTION SUBCUTANEOUS
Qty: 4 PEN | Refills: 5 | Status: SHIPPED | OUTPATIENT
Start: 2025-03-02

## 2025-03-07 RX ORDER — BLOOD-GLUCOSE TRANSMITTER
EACH MISCELLANEOUS
Qty: 1 EACH | Refills: 3 | Status: SHIPPED | OUTPATIENT
Start: 2025-03-07

## 2025-03-10 ENCOUNTER — OFFICE VISIT (OUTPATIENT)
Dept: INTERNAL MEDICINE | Facility: CLINIC | Age: 40
End: 2025-03-10
Payer: COMMERCIAL

## 2025-03-10 DIAGNOSIS — E11.69 TYPE 2 DIABETES MELLITUS WITH OBESITY: ICD-10-CM

## 2025-03-10 DIAGNOSIS — R80.9 TYPE 2 DIABETES MELLITUS WITH MICROALBUMINURIA, WITH LONG-TERM CURRENT USE OF INSULIN: ICD-10-CM

## 2025-03-10 DIAGNOSIS — E11.69 HYPERLIPIDEMIA ASSOCIATED WITH TYPE 2 DIABETES MELLITUS: ICD-10-CM

## 2025-03-10 DIAGNOSIS — Z79.899 DRUG THERAPY: ICD-10-CM

## 2025-03-10 DIAGNOSIS — Z00.00 ANNUAL PHYSICAL EXAM: Primary | ICD-10-CM

## 2025-03-10 DIAGNOSIS — E66.9 TYPE 2 DIABETES MELLITUS WITH OBESITY: ICD-10-CM

## 2025-03-10 DIAGNOSIS — E78.5 HYPERLIPIDEMIA ASSOCIATED WITH TYPE 2 DIABETES MELLITUS: ICD-10-CM

## 2025-03-10 DIAGNOSIS — Z79.4 TYPE 2 DIABETES MELLITUS WITH MICROALBUMINURIA, WITH LONG-TERM CURRENT USE OF INSULIN: ICD-10-CM

## 2025-03-10 DIAGNOSIS — E11.29 TYPE 2 DIABETES MELLITUS WITH MICROALBUMINURIA, WITH LONG-TERM CURRENT USE OF INSULIN: ICD-10-CM

## 2025-03-10 PROCEDURE — 2023F DILAT RTA XM W/O RTNOPTHY: CPT | Mod: CPTII,S$GLB,, | Performed by: FAMILY MEDICINE

## 2025-03-10 PROCEDURE — 3051F HG A1C>EQUAL 7.0%<8.0%: CPT | Mod: CPTII,S$GLB,, | Performed by: FAMILY MEDICINE

## 2025-03-10 PROCEDURE — 99999 PR PBB SHADOW E&M-EST. PATIENT-LVL III: CPT | Mod: PBBFAC,,, | Performed by: FAMILY MEDICINE

## 2025-03-10 PROCEDURE — 1159F MED LIST DOCD IN RCRD: CPT | Mod: CPTII,S$GLB,, | Performed by: FAMILY MEDICINE

## 2025-03-10 PROCEDURE — 99396 PREV VISIT EST AGE 40-64: CPT | Mod: S$GLB,,, | Performed by: FAMILY MEDICINE

## 2025-03-10 RX ORDER — ATORVASTATIN CALCIUM 40 MG/1
40 TABLET, FILM COATED ORAL DAILY
Qty: 90 TABLET | Refills: 3 | Status: SHIPPED | OUTPATIENT
Start: 2025-03-10

## 2025-03-10 NOTE — PROGRESS NOTES
"Subjective:       Patient ID: Fco Zafar III is a 39 y.o. male.    Chief Complaint: Annual Exam    HPI    Fco Zafar III is a 39 y.o. male PMHx DM for checkup     #Cards: HLD  - reg: Lisinopril 2.5 qd; Lipitor 40 qd     #Endo: DM (A1c 10/2024 = 7.2) w/ microalb  - est w/ NP Gutierrez,  2/2025 - upcoming 6/2025  - reg: Omnipod, Metformin  bid; Mounjaro 10 qwk  - using Dexcom  - adverse effects w/ metformin IR 1000 (GI effects)  - eye exam 10/2022  - foot exam: est w/ podiatry,  1/2023  - urine due     #BMI 36    Review of Systems   Constitutional:  Negative for chills, fatigue and fever.   HENT:  Negative for congestion.    Respiratory:  Negative for cough and shortness of breath.    Cardiovascular:  Negative for chest pain and palpitations.   Gastrointestinal:  Negative for abdominal pain, constipation, diarrhea, nausea and vomiting.   Genitourinary:  Negative for dysuria and hematuria.   Musculoskeletal:  Negative for back pain.   Skin:  Negative for rash.   Neurological:  Negative for weakness, numbness and headaches.         Past Medical History:   Diagnosis Date    Diabetes     Diabetic ketoacidosis without coma 3/27/2023    DKA (diabetic ketoacidoses)     Hyperlipidemia     Hypertension     Sepsis 3/27/2023        Prior to Admission medications    Medication Sig Start Date End Date Taking? Authorizing Provider   ammonium lactate 12 % Crea Use 2 x a  day 2/6/24   Amsita Gutierrez APRN, FNP   atorvastatin (LIPITOR) 40 MG tablet Take 1 tablet (40 mg total) by mouth once daily. 6/17/24   Nirav Lanza MD   BD AMELIA 2ND GEN PEN NEEDLE 32 gauge x 5/32" Ndle USE AS DIRECTED FOUR TIMES DAILY 2/5/24   Asmita Gutierrez APRN, ROSIE   blood-glucose sensor (DEXCOM G6 SENSOR) Mabel Change every 10 days 2/16/25   Asmita Gutierrez APRN, ROSIE   blood-glucose transmitter (DEXCOM G6 TRANSMITTER) Mabel Change every 3 months 3/7/25   Asmita Gutierrez APRN, FNP   ciclopirox (LOPROX) 0.77 % Crea Apply topically 2 " (two) times daily. 2/5/25   Asmita Gutierrez APRN, FNP   ciclopirox (PENLAC) 8 % Soln Apply topically nightly. 7/17/23   Jc Hubbard, TANISHA   glucagon (BAQSIMI) 3 mg/actuation Spry Give one puff via nostril. Hold device between fingers and thumb, do not push plunger yet, insert tip gently into one nostril until finger(s) touch the outside of the nose, then push plunger firmly all the way in . Dose is complete when the green line disappears. 1/9/23   Asmita Gutierrez APRN, FNP   insulin aspart U-100 (NOVOLOG FLEXPEN U-100 INSULIN) 100 unit/mL (3 mL) InPn pen Inject 12 units before meals plus scale 150-200+2, 201-250+4, 251-300+6, 301-350+8, >350+10. Max daily 66 units. 2/7/25   Asmita Gutierrez APRN, FNP   insulin aspart U-100 (NOVOLOG U-100 INSULIN ASPART) 100 unit/mL injection Use in insulin pump, max daily 80 units. 2/6/25   Asmita Gutierrez APRN, FNP   insulin glargine U-300 conc (TOUJEO MAX U-300 SOLOSTAR) 300 unit/mL (3 mL) insulin pen Inject 26 units at night 2/6/24   Asmita Gutierrez APRN, FNP   insulin pump cart,auto,BT,G6/7 (OMNIPOD 5 G6-G7 PODS, GEN 5,) Crtg Change every 48 hours. 1/27/25   Asmita Gutierrez APRN, FNP   insulin pump cart,auto,BT-cntr (OMNIPOD 5 G6 INTRO KIT, GEN 5,) Crtg 1 Device by subcutaneous (via wearable injector) route continuous. 2/6/24   Asmita Gutierrez APRN, FNP   insulin pump cart,automated,BT (OMNIPOD 5 G6 PODS, GEN 5,) Crtg 1 Device by subcutaneous (via wearable injector) route every 48 hours. 2/6/24   Asmita Gutierrez APRN, FNP   lisinopriL (PRINIVIL,ZESTRIL) 2.5 MG tablet Take 1 tablet (2.5 mg total) by mouth once daily. 11/25/24   Nirav Lanza MD   metFORMIN (GLUCOPHAGE-XR) 500 MG ER 24hr tablet Take 1 tablet (500 mg total) by mouth 2 (two) times daily with meals. 1/9/23 10/2/24  Asmita Gutierrez APRN, FNP   MICRO THIN LANCETS 33 gauge Misc  4/28/20   Provider, Historical   sildenafiL (VIAGRA) 100 MG tablet Take 1 tablet (100 mg total) by mouth daily  as needed for Erectile Dysfunction. 7/1/24 7/1/25  Nirav Lanza MD   tirzepatide (MOUNJARO) 10 mg/0.5 mL PnIj Inject 10 mg into the skin every 7 days. Type 2 diabetes e 11.65 3/2/25   Asmita Gutierrez, APRN, FNP        Past medical history, surgical history, and family medical history reviewed and updated as appropriate.    Medications and allergies reviewed.     Objective:          There were no vitals filed for this visit.  There is no height or weight on file to calculate BMI.  Physical Exam  Vitals and nursing note reviewed.   Constitutional:       General: He is not in acute distress.     Appearance: Normal appearance. He is well-developed.   Eyes:      Extraocular Movements: Extraocular movements intact.   Cardiovascular:      Rate and Rhythm: Normal rate and regular rhythm.      Pulses: Normal pulses.      Heart sounds: Normal heart sounds. No murmur heard.  Pulmonary:      Effort: Pulmonary effort is normal. No respiratory distress.      Breath sounds: Normal breath sounds. No wheezing.   Neurological:      Mental Status: He is alert and oriented to person, place, and time.   Psychiatric:         Mood and Affect: Mood normal.         Behavior: Behavior normal.         Lab Results   Component Value Date    WBC 9.01 03/30/2023    HGB 11.4 (L) 03/30/2023    HCT 33.8 (L) 03/30/2023     03/30/2023    CHOL 147 02/20/2025    TRIG 43 02/20/2025    HDL 44 02/20/2025    ALT 10 03/27/2023    AST 11 03/27/2023     03/30/2023    K 3.5 03/30/2023     03/30/2023    CREATININE 0.8 03/30/2023    BUN 8 03/30/2023    CO2 25 03/30/2023    TSH 0.966 01/05/2022    GLUF 73 05/06/2024    HGBA1C 7.2 (H) 02/20/2025       Assessment:       1. Annual physical exam    2. Drug therapy    3. Type 2 diabetes mellitus with microalbuminuria, with long-term current use of insulin    4. Type 2 diabetes mellitus with obesity    5. Hyperlipidemia associated with type 2 diabetes mellitus          Plan:   1. Annual  physical exam  -     Comprehensive Metabolic Panel; Future; Expected date: 03/10/2025    2. Drug therapy  -     Comprehensive Metabolic Panel; Future; Expected date: 03/10/2025    3. Type 2 diabetes mellitus with microalbuminuria, with long-term current use of insulin  Overview:  Cont reg incl mounjaro  - adverse GI effects w/ metformin IR      4. Type 2 diabetes mellitus with obesity    5. Hyperlipidemia associated with type 2 diabetes mellitus  Overview:  Cont statin    Orders:  -     atorvastatin (LIPITOR) 40 MG tablet; Take 1 tablet (40 mg total) by mouth once daily.  Dispense: 90 tablet; Refill: 3        Health maintenance reviewed with patient.     Follow up in about 1 year (around 3/10/2026) for Annual.    As this patient's primary care physician, I am actively managing and/or treating his/her chronic medical conditions now and in the future. This includes, but is not limited to, medication management, coordination of care, documentation review from his/her specialists, and labs/imaging review that I have performed to prepare for this visit as well as will do so in the future as part of my care continuity for this patient.    Nirav Lanza MD  Family Medicine / Primary Care  [facility]  3/10/2025

## 2025-04-17 RX ORDER — BLOOD-GLUCOSE SENSOR
EACH MISCELLANEOUS
Qty: 3 EACH | Refills: 11 | Status: SHIPPED | OUTPATIENT
Start: 2025-04-17

## 2025-04-17 RX ORDER — INSULIN PMP CART,AUT,G6/7,CNTR
EACH SUBCUTANEOUS
Qty: 45 EACH | Refills: 3 | Status: SHIPPED | OUTPATIENT
Start: 2025-04-17

## 2025-06-06 ENCOUNTER — LAB VISIT (OUTPATIENT)
Dept: LAB | Facility: HOSPITAL | Age: 40
End: 2025-06-06
Attending: NURSE PRACTITIONER
Payer: COMMERCIAL

## 2025-06-06 DIAGNOSIS — E11.29 TYPE 2 DIABETES MELLITUS WITH MICROALBUMINURIA, WITH LONG-TERM CURRENT USE OF INSULIN: ICD-10-CM

## 2025-06-06 DIAGNOSIS — E66.01 SEVERE OBESITY (BMI 35.0-39.9) WITH COMORBIDITY: ICD-10-CM

## 2025-06-06 DIAGNOSIS — R80.9 TYPE 2 DIABETES MELLITUS WITH MICROALBUMINURIA, WITH LONG-TERM CURRENT USE OF INSULIN: ICD-10-CM

## 2025-06-06 DIAGNOSIS — Z79.4 TYPE 2 DIABETES MELLITUS WITH MICROALBUMINURIA, WITH LONG-TERM CURRENT USE OF INSULIN: ICD-10-CM

## 2025-06-06 DIAGNOSIS — Z00.00 ANNUAL PHYSICAL EXAM: ICD-10-CM

## 2025-06-06 DIAGNOSIS — I15.2 HYPERTENSION ASSOCIATED WITH DIABETES: ICD-10-CM

## 2025-06-06 DIAGNOSIS — Z79.899 DRUG THERAPY: ICD-10-CM

## 2025-06-06 DIAGNOSIS — E11.59 HYPERTENSION ASSOCIATED WITH DIABETES: ICD-10-CM

## 2025-06-06 LAB
ALBUMIN SERPL BCP-MCNC: 4.6 G/DL (ref 3.5–5.2)
ALBUMIN/CREAT UR: 87.9 UG/MG
ALP SERPL-CCNC: 103 UNIT/L (ref 40–150)
ALT SERPL W/O P-5'-P-CCNC: 24 UNIT/L (ref 10–44)
ANION GAP (OHS): 8 MMOL/L (ref 8–16)
AST SERPL-CCNC: 26 UNIT/L (ref 11–45)
BILIRUB SERPL-MCNC: 0.3 MG/DL (ref 0.1–1)
BUN SERPL-MCNC: 11 MG/DL (ref 6–20)
CALCIUM SERPL-MCNC: 9.2 MG/DL (ref 8.7–10.5)
CHLORIDE SERPL-SCNC: 107 MMOL/L (ref 95–110)
CO2 SERPL-SCNC: 25 MMOL/L (ref 23–29)
CREAT SERPL-MCNC: 0.9 MG/DL (ref 0.5–1.4)
CREAT UR-MCNC: 149 MG/DL (ref 23–375)
EAG (OHS): 143 MG/DL (ref 68–131)
GFR SERPLBLD CREATININE-BSD FMLA CKD-EPI: >60 ML/MIN/1.73/M2
GLUCOSE SERPL-MCNC: 80 MG/DL (ref 70–110)
HBA1C MFR BLD: 6.6 % (ref 4–5.6)
MICROALBUMIN UR-MCNC: 131 UG/ML (ref ?–5000)
POTASSIUM SERPL-SCNC: 4.2 MMOL/L (ref 3.5–5.1)
PROT SERPL-MCNC: 7.8 GM/DL (ref 6–8.4)
SODIUM SERPL-SCNC: 140 MMOL/L (ref 136–145)

## 2025-06-06 PROCEDURE — 82043 UR ALBUMIN QUANTITATIVE: CPT

## 2025-06-06 PROCEDURE — 83036 HEMOGLOBIN GLYCOSYLATED A1C: CPT

## 2025-06-06 PROCEDURE — 84132 ASSAY OF SERUM POTASSIUM: CPT

## 2025-06-06 PROCEDURE — 36415 COLL VENOUS BLD VENIPUNCTURE: CPT | Mod: PO

## 2025-06-10 ENCOUNTER — PATIENT MESSAGE (OUTPATIENT)
Dept: INTERNAL MEDICINE | Facility: CLINIC | Age: 40
End: 2025-06-10
Payer: COMMERCIAL

## 2025-06-10 RX ORDER — TIRZEPATIDE 10 MG/.5ML
10 INJECTION, SOLUTION SUBCUTANEOUS
Qty: 4 PEN | Refills: 5 | Status: SHIPPED | OUTPATIENT
Start: 2025-06-10

## 2025-06-10 NOTE — TELEPHONE ENCOUNTER
Refill Routing Note   Medication(s) are not appropriate for processing by [facility] for the following reason(s):        Non-participating provider    [facility] action(s):  Route               Appointments  past 12m or future 3m with PCP    Date Provider   Last Visit   2/5/2025 Asmita Gutierrez APRN, FNP   Next Visit   6/11/2025 Asmita Gutierrez APRN, FNP   ED visits in past 90 days: 0        Note composed:9:56 AM 06/10/2025

## 2025-06-11 ENCOUNTER — PATIENT MESSAGE (OUTPATIENT)
Dept: INTERNAL MEDICINE | Facility: CLINIC | Age: 40
End: 2025-06-11
Payer: COMMERCIAL

## (undated) DEVICE — SEE MEDLINE ITEM 157194

## (undated) DEVICE — SPONGE KITTNER 1/4X 5/8 L STRL

## (undated) DEVICE — PACK HEAD & NECK

## (undated) DEVICE — GLOVE BIOGEL ECLIPSE SZ 6

## (undated) DEVICE — BLANKET LOWER BODY 55.9X40.2IN

## (undated) DEVICE — SUT 3-0 12-18IN SILK

## (undated) DEVICE — TOWEL OR DISP STRL BLUE 4/PK

## (undated) DEVICE — COVER OVERHEAD SURG LT BLUE

## (undated) DEVICE — POSITIONER HEAD DONUT 9IN FOAM

## (undated) DEVICE — GLOVE SURGICAL LATEX SZ 6.5

## (undated) DEVICE — CANISTER SUCTION 2 LTR

## (undated) DEVICE — SEE MEDLINE ITEM 157110

## (undated) DEVICE — FORCEP SPTZLR-MALIS 1.5MM 8IN

## (undated) DEVICE — SOL IRR SOD CHL .9% POUR

## (undated) DEVICE — ELECTRODE REM PLYHSV RETURN 9

## (undated) DEVICE — ELECTRODE BLADE INSULATED 1 IN

## (undated) DEVICE — SUPPORT ULNA NERVE PROTECTOR

## (undated) DEVICE — CORD FOR BIPOLAR FORCEPS 12

## (undated) DEVICE — SUT VICRYL PLUS 3-0 SH 18IN

## (undated) DEVICE — SUT 4-0 CV RB-1 UND CR

## (undated) DEVICE — STAPLER SKIN ROTATING HEAD

## (undated) DEVICE — SPONGE GAUZE 16PLY 4X4

## (undated) DEVICE — DRAPE STERI INSTRUMENT 1018

## (undated) DEVICE — Device

## (undated) DEVICE — CONTAINER SPECIMEN STRL 4OZ